# Patient Record
Sex: FEMALE | Race: WHITE | NOT HISPANIC OR LATINO | Employment: OTHER | URBAN - METROPOLITAN AREA
[De-identification: names, ages, dates, MRNs, and addresses within clinical notes are randomized per-mention and may not be internally consistent; named-entity substitution may affect disease eponyms.]

---

## 2017-01-12 ENCOUNTER — OFFICE VISIT (OUTPATIENT)
Dept: GASTROENTEROLOGY | Facility: CLINIC | Age: 67
End: 2017-01-12

## 2017-01-12 VITALS
BODY MASS INDEX: 25.83 KG/M2 | HEIGHT: 65 IN | SYSTOLIC BLOOD PRESSURE: 146 MMHG | DIASTOLIC BLOOD PRESSURE: 90 MMHG | WEIGHT: 155 LBS

## 2017-01-12 DIAGNOSIS — R63.5 WEIGHT GAIN: ICD-10-CM

## 2017-01-12 DIAGNOSIS — R12 HEARTBURN: Primary | ICD-10-CM

## 2017-01-12 PROCEDURE — 99204 OFFICE O/P NEW MOD 45 MIN: CPT | Performed by: INTERNAL MEDICINE

## 2017-01-12 RX ORDER — SODIUM CHLORIDE, SODIUM LACTATE, POTASSIUM CHLORIDE, CALCIUM CHLORIDE 600; 310; 30; 20 MG/100ML; MG/100ML; MG/100ML; MG/100ML
30 INJECTION, SOLUTION INTRAVENOUS CONTINUOUS
Status: CANCELLED | OUTPATIENT
Start: 2017-01-12

## 2017-01-12 RX ORDER — SODIUM CHLORIDE 0.9 % (FLUSH) 0.9 %
1-10 SYRINGE (ML) INJECTION AS NEEDED
Status: CANCELLED | OUTPATIENT
Start: 2017-01-12

## 2017-01-12 RX ORDER — PANTOPRAZOLE SODIUM 40 MG/1
40 TABLET, DELAYED RELEASE ORAL DAILY
Qty: 30 TABLET | Refills: 5 | Status: SHIPPED | OUTPATIENT
Start: 2017-01-12 | End: 2017-06-29 | Stop reason: SDUPTHER

## 2017-01-12 NOTE — MR AVS SNAPSHOT
Teagan Tillmant   1/12/2017 9:45 AM   Office Visit    Dept Phone:  [phone]   Encounter #:  [id number]    Provider:  Julianne White MD   Department:  [facility] GASTRO[facility]OLOG[facility]                Your Full Care Plan              Today's Medication Changes          These changes are accurate as of: 1/12/17 10:57 AM.  If you have any questions, ask your nurse or doctor.               New Medication(s)Ordered:     pantoprazole 40 MG EC tablet   Commonly known as:  PROTONIX   Take 1 tablet by mouth Daily. Take 30 minutes before breakfast   Started by:  Julianne White MD         Medication(s)that have changed:     venlafaxine  MG 24 hr capsule   Commonly known as:  EFFEXOR-XR   TAKE 1 CAPSULE DAILY.   What changed:  Another medication with the same name was removed. Continue taking this medication, and follow the directions you see here.   Changed by:  Jordan Jerez MD         Stop taking medication(s)listed here:     ESTRING 2 MG vaginal ring   Generic drug:  estradiol   Stopped by:  Julianne White MD           omeprazole 40 MG capsule   Commonly known as:  priLOSEC   Stopped by:  Julianne White MD                Where to Get Your Medications      These medications were sent to [facility] - [place], [place] - [address] - [phone] [facility] [phone] FX  [address] [address]     Phone:  [phone]     pantoprazole 40 MG EC tablet                  Your Updated Medication List          This list is accurate as of: 1/12/17 10:57 AM.  Always use your most recent med list.                candesartan 16 MG tablet   Commonly known as:  ATACAND   TAKE 1 TABLET EVERY DAY       CRESTOR 10 MG tablet   Generic drug:  rosuvastatin   TAKE 1 TABLET EVERY DAY       pantoprazole 40 MG EC tablet   Commonly known as:  PROTONIX   Take 1 tablet by mouth Daily. Take 30 minutes before breakfast       venlafaxine  MG 24 hr capsule      Commonly known as:  EFFEXOR-XR   TAKE 1 CAPSULE DAILY.               We Performed the Following     Case Request       You Were Diagnosed With        Codes Comments    Heartburn    -  Primary ICD-10-CM: R12  ICD-9-CM: 787.1 persistent despite    Weight gain     ICD-10-CM: R63.5  ICD-9-CM: 783.1       Instructions    Schedule the EGD    Stop the zegerid.  Start pantoprazole and take daily 30 minutes before breakfast    Gaol of 10# weight loss over the next year    For any additional questions, concerns or changes to your condition after today's office visit please contact the office at [phone].     Patient Instructions History      Upcoming Appointments     Visit Type Date Time Department    NEW PATIENT 2017  9:45 AM [facility] [place]    [facility]    [facility] UP [facility]  8[facility]00 [facility]      [facility] Signup     [facility] [employer] allows you to send messages to your doctor, view your test results, renew your prescriptions, schedule appointments, and more. To sign up, go to [employer] and click on the Sign Up Now link in the New User? box. Enter your K[employer] Activation Code exactly as it appears below along with the last four digits of your Social Security Number and your Date of Birth () to complete the sign-up process. If you do not sign up before the expiration date, you must request a new code.    K[employer]ek Activation Code: [email or web address]  Expires: 2017 10:53 AM    If you have questions, you can email [email or web address] [email or web address] or call [phone] to talk to our [employer] staff. Remember, K[employer] is NOT to be used for urgent needs. For medical emergencies, dial 911.               Other Info from Your Visit           Your Appointments     2017  8:15 AM EDT   [facility] (--)    [address] [address]   [phone]            May 01,  "2017  8:00 AM EDT   Follow Up with Jordan Jerez MD   [facility] (--)    [address] [address] [phone]           Arrive 15 minutes prior to appointment.              Allergies     Iodinated Diagnostic Agents      Sulfa Antibiotics        Reason for Visit     Heartburn           Vital Signs     Blood Pressure Height Weight Body Mass Index Smoking Status       146/90 65\" (165.1 cm) 155 lb (70.3 kg) 25.79 kg/m2 Never Smoker       Problems and Diagnoses Noted     Heartburn    -  Primary    Weight gain            "

## 2017-01-12 NOTE — PATIENT INSTRUCTIONS
Schedule the EGD    Stop the zegerid.  Start pantoprazole and take daily 30 minutes before breakfast    Gaol of 10# weight loss over the next year    For any additional questions, concerns or changes to your condition after today's office visit please contact the office at [phone].

## 2017-01-12 NOTE — PROGRESS NOTES
Chief Complaint   Patient presents with   • Heartburn       Subjective     HPI    Teagan Cates is a 66 y.o. female with a past medical history noted below who presents for evaluation of heartburn.  Symptoms present for about 5 years.  Describes suprasternal burning sensation and a bad metallic taste in her mouth.  Occurs mostly at night.  Frequently awakes her from sleeping.  She has been on Zegerid for this period of time.  She takes it nightly.  It used to control her symptoms but now she is having breakthrough symptoms.  She has had a breakthrough symptoms for approximate the past 4 months.  These breakthrough episode have been occurring about 5 times per week.  No associated nausea or vomiting.  No associated weight loss.  No change in her bowel habits.  She has been taking additional Tums at nighttime to help with the symptoms.    She thinks that she has gained about 30# over the past few years.      She has never had an EGD.    No smoking, drinks 2 glasses of wine at dinner.  Works at [employer].    No family history of GI malignancy.  She is up to date on her colonscopy-- last year and normal.        Past Medical History   Diagnosis Date   • Anxiety    • GERD (gastroesophageal reflux disease)    • Hyperlipidemia    • Hypertension          Current Outpatient Prescriptions:   •  candesartan (ATACAND) 16 MG tablet, TAKE 1 TABLET EVERY DAY, Disp: 90 tablet, Rfl: 3  •  CRESTOR 10 MG tablet, TAKE 1 TABLET EVERY DAY, Disp: 90 tablet, Rfl: 3  •  venlafaxine XR (EFFEXOR-XR) 150 MG 24 hr capsule, TAKE 1 CAPSULE DAILY., Disp: 90 capsule, Rfl: 3  •  pantoprazole (PROTONIX) 40 MG EC tablet, Take 1 tablet by mouth Daily. Take 30 minutes before breakfast, Disp: 30 tablet, Rfl: 5    Allergies   Allergen Reactions   • Iodinated Diagnostic Agents    • Sulfa Antibiotics        Social History     Social History   • Marital status:      Spouse name: N/A   • Number of children: N/A   • Years of education: N/A      Occupational History   • Not on file.     Social History Main Topics   • Smoking status: Never Smoker   • Smokeless tobacco: Not on file   • Alcohol use Yes      Comment: SOCIAL   • Drug use: No   • Sexual activity: Defer     Other Topics Concern   • Not on file     Social History Narrative       Family History   Problem Relation Age of Onset   • Hyperlipidemia Mother    • Stroke Mother    • Heart disease Father    • Stroke Sister    • Inflammatory bowel disease Brother        Review of Systems   Constitutional: Negative for activity change, appetite change and fatigue.   HENT: Positive for congestion. Negative for sore throat and trouble swallowing.         Currently with URI   Eyes: Negative.    Respiratory: Negative.    Cardiovascular: Negative.    Gastrointestinal: Negative for abdominal distention, abdominal pain, blood in stool, nausea and vomiting.        +heartburn   Endocrine: Negative for cold intolerance and heat intolerance.   Genitourinary: Negative for difficulty urinating, dysuria and frequency.   Musculoskeletal: Negative for arthralgias, back pain and myalgias.   Skin: Positive for rash.        On left arm, seen by her derm   Hematological: Negative for adenopathy. Does not bruise/bleed easily.   All other systems reviewed and are negative.      Objective     Vitals:    01/12/17 1008   BP: 146/90       Physical Exam   Constitutional: She is oriented to person, place, and time. She appears well-developed and well-nourished. No distress.   HENT:   Head: Normocephalic and atraumatic.   Right Ear: External ear normal.   Left Ear: External ear normal.   Nose: Nose normal.   Mouth/Throat: Oropharynx is clear and moist.   Eyes: Conjunctivae and EOM are normal. Right eye exhibits no discharge. Left eye exhibits no discharge. No scleral icterus.   Neck: Normal range of motion. Neck supple. No thyromegaly present.   No supraclavicular adenopathy   Cardiovascular: Normal rate, regular rhythm, normal heart  sounds and intact distal pulses.  Exam reveals no gallop.    No murmur heard.  No lower extremity edema   Pulmonary/Chest: Effort normal and breath sounds normal. No respiratory distress. She has no wheezes.   Abdominal: Soft. Normal appearance and bowel sounds are normal. She exhibits no distension and no mass. There is no hepatosplenomegaly. There is no tenderness. There is no rigidity, no rebound and no guarding.   Genitourinary:   Genitourinary Comments: Rectal exam deferred   Musculoskeletal: Normal range of motion. She exhibits no edema or tenderness.   No atrophy of upper or lower extremities.  Normal digits and nails of both hands.   Lymphadenopathy:     She has no cervical adenopathy.   Neurological: She is alert and oriented to person, place, and time. She displays no atrophy. Coordination normal.   Skin: Skin is warm and dry. No rash noted. She is not diaphoretic. No erythema.   Psychiatric: She has a normal mood and affect. Her behavior is normal. Judgment and thought content normal.   Vitals reviewed.      WBC   Date Value Ref Range Status   08/22/2015 9.44 4.50 - 10.70 K/Cumm Final     RBC   Date Value Ref Range Status   08/22/2015 4.45 3.90 - 5.20 Million Final     HEMOGLOBIN   Date Value Ref Range Status   08/22/2015 13.5 11.9 - 15.5 g/dL Final     HEMATOCRIT   Date Value Ref Range Status   08/22/2015 41.2 35.6 - 45.5 % Final     MCV   Date Value Ref Range Status   08/22/2015 92.6 80.5 - 98.2 fL Final     MCH   Date Value Ref Range Status   08/22/2015 30.3 26.9 - 32.0 pg Final     MCHC   Date Value Ref Range Status   08/22/2015 32.8 32.4 - 36.3 g/dL Final     RDW   Date Value Ref Range Status   08/22/2015 12.4 11.7 - 13.0 % Final     PLATELETS   Date Value Ref Range Status   08/22/2015 308 140 - 500 K/Cumm Final     NEUTROPHIL REL %   Date Value Ref Range Status   07/14/2014 54.6 42.7 - 76.0 % Final     LYMPHOCYTE REL %   Date Value Ref Range Status   07/14/2014 32.5 19.6 - 45.3 % Final     MONOCYTE  REL %   Date Value Ref Range Status   07/14/2014 8.0 5.0 - 12.0 % Final     EOSINOPHIL REL %   Date Value Ref Range Status   07/14/2014 4.3 0.3 - 6.2 % Final     BASOPHIL REL %   Date Value Ref Range Status   07/14/2014 0.6 0.0 - 1.5 % Final     NEUTROPHILS ABSOLUTE   Date Value Ref Range Status   07/14/2014 4.8 1.9 - 8.1 K/Cumm Final     LYMPHOCYTES ABSOLUTE   Date Value Ref Range Status   07/14/2014 2.9 0.9 - 4.8 K/Cumm Final     MONOCYTES ABSOLUTE   Date Value Ref Range Status   07/14/2014 0.7 0.2 - 1.2 K/Cumm Final     EOSINOPHILS ABSOLUTE   Date Value Ref Range Status   07/14/2014 0.4 0.0 - 0.7 K/Cumm Final     BASOPHILS ABSOLUTE   Date Value Ref Range Status   07/14/2014 0.1 0.0 - 0.2 K/Cumm Final       GLUCOSE   Date Value Ref Range Status   07/14/2014 90 65 - 99 mg/dL Final     SODIUM   Date Value Ref Range Status   10/12/2016 145 136 - 145 mmol/L Final     POTASSIUM   Date Value Ref Range Status   10/12/2016 4.7 3.5 - 5.2 mmol/L Final     CO2   Date Value Ref Range Status   07/14/2014 26 22 - 29 mmol/L Final     TOTAL CO2   Date Value Ref Range Status   10/12/2016 26.8 22.0 - 29.0 mmol/L Final     CHLORIDE   Date Value Ref Range Status   10/12/2016 103 98 - 107 mmol/L Final     CREATININE   Date Value Ref Range Status   10/12/2016 0.65 0.57 - 1.00 mg/dL Final   07/14/2014 0.60 0.57 - 1.00 mg/dL Final     BUN   Date Value Ref Range Status   10/12/2016 17 8 - 23 mg/dL Final     BUN/CREATININE RATIO   Date Value Ref Range Status   10/12/2016 26.2 (H) 7.0 - 25.0 Final     CALCIUM   Date Value Ref Range Status   10/12/2016 10.0 8.6 - 10.5 mg/dL Final     EGFR NON  AM   Date Value Ref Range Status   10/12/2016 91 >60 mL/min/1.73 Final     ALKALINE PHOSPHATASE   Date Value Ref Range Status   10/12/2016 53 39 - 117 U/L Final     ALT (SGPT)   Date Value Ref Range Status   10/12/2016 40 (H) 1 - 33 U/L Final     AST (SGOT)   Date Value Ref Range Status   10/12/2016 31 1 - 32 U/L Final     TOTAL BILIRUBIN   Date  Value Ref Range Status   10/12/2016 0.3 0.1 - 1.2 mg/dL Final     ALBUMIN   Date Value Ref Range Status   10/12/2016 4.50 3.50 - 5.20 g/dL Final     A/G RATIO   Date Value Ref Range Status   10/12/2016 1.6 g/dL Final         Imaging Results (last 7 days)     ** No results found for the last 168 hours. **            No notes on file    Assessment/Plan      1)heartburn: previously controlled by daily Zegerid.  Now with decreased control and breakthrough nocturnal symptoms.  Her weight has been stable over the past year.    2) fluctuating ALT: modest elevation that has been intermittently normal and intermittently a few points higher than ULN.  ? Effect of statin    3)weight gain: she reports abotu 30# increase over the past few years, likely contributing to her development of heartburn in her 60s.    Plan:  -given her age, duration of heartburn, and increase in symptoms despite therapy, I think it is time for her to have an EGD evaluation  -I will change her from daily omeprazole to daily pantoprazole.  I have educated her on how to take this 30 minutes before breakfast daily  -advised Gaviscon for breakthrough symptoms  -Additionally I advised for a 10 pound weight loss over the next year.  I think this will help her quite a bit with heartburn symptoms  -we will continue to follow her ALT levels and can consider liver ultrasound if they persist and fluctuating      Teagan was seen today for heartburn.    Diagnoses and all orders for this visit:    Heartburn  Comments:  persistent despite treatment  Orders:  -     pantoprazole (PROTONIX) 40 MG EC tablet; Take 1 tablet by mouth Daily. Take 30 minutes before breakfast  -     Case Request; Standing  -     Implement Anesthesia Orders Day of Procedure; Standing  -     Obtain Informed Consent; Standing  -     Verify Informed Consent; Standing  -     Insert Peripheral IV; Standing  -     Saline Lock & Maintain IV Access; Standing  -     sodium chloride 0.9 % flush 1-10 mL;  Infuse 1-10 mL into a venous catheter As Needed for line care.  -     lactated ringers infusion; Infuse 30 mL/hr into a venous catheter Continuous.  -     Case Request    Weight gain      I have discussed the above plan with the patient.  They verbalize understanding and are in agreement with the plan.  They have been advised to contact the office for any questions, concerns, or changes related to their health.    EMR Dragon/Transcription disclaimer:       Much of this encounter note is an electronic transcription/translation of spoken language to printed text. The electronic translation of spoken language may permit erroneous, or at times, nonsensical words or phrases to be inadvertently transcribed; Although I have reviewed the note for such errors, some may still exist.

## 2017-01-12 NOTE — LETTER
January 12, 2017     Jordan eJrez MD  [address] [facility] [address]    Patient: Teagan Cates   YOB: 1950   Date of Visit: 1/12/2017       Dear Dr. Solitario MD:    Thank you for referring Teagan Cates to me for evaluation. Below is a copy of my consult note.    If you have questions, please do not hesitate to call me. I look forward to following Teagan along with you.         Sincerely,        Julianne White MD        CC: No Recipients    Chief Complaint   Patient presents with   • Heartburn       Subjective     HPI    Teagan Cates is a 66 y.o. female with a past medical history noted below who presents for evaluation of heartburn.  Symptoms present for about 5 years.  Describes suprasternal burning sensation and a bad metallic taste in her mouth.  Occurs mostly at night.  Frequently awakes her from sleeping.  She has been on Zegerid for this period of time.  She takes it nightly.  It used to control her symptoms but now she is having breakthrough symptoms.  She has had a breakthrough symptoms for approximate the past 4 months.  These breakthrough episode have been occurring about 5 times per week.  No associated nausea or vomiting.  No associated weight loss.  No change in her bowel habits.  She has been taking additional Tums at nighttime to help with the symptoms.    She thinks that she has gained about 30# over the past few years.      She has never had an EGD.    No smoking, drinks 2 glasses of wine at dinner.  Works at [employer].    No family history of GI malignancy.  She is up to date on her colonscopy-- last year and normal.        Past Medical History   Diagnosis Date   • Anxiety    • GERD (gastroesophageal reflux disease)    • Hyperlipidemia    • Hypertension          Current Outpatient Prescriptions:   •  candesartan (ATACAND) 16 MG tablet, TAKE 1 TABLET EVERY DAY, Disp: 90 tablet, Rfl: 3  •  CRESTOR 10 MG tablet, TAKE 1 TABLET EVERY DAY, Disp: 90 tablet, Rfl:  3  •  venlafaxine XR (EFFEXOR-XR) 150 MG 24 hr capsule, TAKE 1 CAPSULE DAILY., Disp: 90 capsule, Rfl: 3  •  pantoprazole (PROTONIX) 40 MG EC tablet, Take 1 tablet by mouth Daily. Take 30 minutes before breakfast, Disp: 30 tablet, Rfl: 5    Allergies   Allergen Reactions   • Iodinated Diagnostic Agents    • Sulfa Antibiotics        Social History     Social History   • Marital status:      Spouse name: N/A   • Number of children: N/A   • Years of education: N/A     Occupational History   • Not on file.     Social History Main Topics   • Smoking status: Never Smoker   • Smokeless tobacco: Not on file   • Alcohol use Yes      Comment: SOCIAL   • Drug use: No   • Sexual activity: Defer     Other Topics Concern   • Not on file     Social History Narrative       Family History   Problem Relation Age of Onset   • Hyperlipidemia Mother    • Stroke Mother    • Heart disease Father    • Stroke Sister    • Inflammatory bowel disease Brother        Review of Systems   Constitutional: Negative for activity change, appetite change and fatigue.   HENT: Positive for congestion. Negative for sore throat and trouble swallowing.         Currently with URI   Eyes: Negative.    Respiratory: Negative.    Cardiovascular: Negative.    Gastrointestinal: Negative for abdominal distention, abdominal pain, blood in stool, nausea and vomiting.        +heartburn   Endocrine: Negative for cold intolerance and heat intolerance.   Genitourinary: Negative for difficulty urinating, dysuria and frequency.   Musculoskeletal: Negative for arthralgias, back pain and myalgias.   Skin: Positive for rash.        On left arm, seen by her derm   Hematological: Negative for adenopathy. Does not bruise/bleed easily.   All other systems reviewed and are negative.      Objective     Vitals:    01/12/17 1008   BP: 146/90       Physical Exam   Constitutional: She is oriented to person, place, and time. She appears well-developed and well-nourished. No  distress.   HENT:   Head: Normocephalic and atraumatic.   Right Ear: External ear normal.   Left Ear: External ear normal.   Nose: Nose normal.   Mouth/Throat: Oropharynx is clear and moist.   Eyes: Conjunctivae and EOM are normal. Right eye exhibits no discharge. Left eye exhibits no discharge. No scleral icterus.   Neck: Normal range of motion. Neck supple. No thyromegaly present.   No supraclavicular adenopathy   Cardiovascular: Normal rate, regular rhythm, normal heart sounds and intact distal pulses.  Exam reveals no gallop.    No murmur heard.  No lower extremity edema   Pulmonary/Chest: Effort normal and breath sounds normal. No respiratory distress. She has no wheezes.   Abdominal: Soft. Normal appearance and bowel sounds are normal. She exhibits no distension and no mass. There is no hepatosplenomegaly. There is no tenderness. There is no rigidity, no rebound and no guarding.   Genitourinary:   Genitourinary Comments: Rectal exam deferred   Musculoskeletal: Normal range of motion. She exhibits no edema or tenderness.   No atrophy of upper or lower extremities.  Normal digits and nails of both hands.   Lymphadenopathy:     She has no cervical adenopathy.   Neurological: She is alert and oriented to person, place, and time. She displays no atrophy. Coordination normal.   Skin: Skin is warm and dry. No rash noted. She is not diaphoretic. No erythema.   Psychiatric: She has a normal mood and affect. Her behavior is normal. Judgment and thought content normal.   Vitals reviewed.      WBC   Date Value Ref Range Status   08/22/2015 9.44 4.50 - 10.70 K/Cumm Final     RBC   Date Value Ref Range Status   08/22/2015 4.45 3.90 - 5.20 Million Final     HEMOGLOBIN   Date Value Ref Range Status   08/22/2015 13.5 11.9 - 15.5 g/dL Final     HEMATOCRIT   Date Value Ref Range Status   08/22/2015 41.2 35.6 - 45.5 % Final     MCV   Date Value Ref Range Status   08/22/2015 92.6 80.5 - 98.2 fL Final     MCH   Date Value Ref Range  Status   08/22/2015 30.3 26.9 - 32.0 pg Final     MCHC   Date Value Ref Range Status   08/22/2015 32.8 32.4 - 36.3 g/dL Final     RDW   Date Value Ref Range Status   08/22/2015 12.4 11.7 - 13.0 % Final     PLATELETS   Date Value Ref Range Status   08/22/2015 308 140 - 500 K/Cumm Final     NEUTROPHIL REL %   Date Value Ref Range Status   07/14/2014 54.6 42.7 - 76.0 % Final     LYMPHOCYTE REL %   Date Value Ref Range Status   07/14/2014 32.5 19.6 - 45.3 % Final     MONOCYTE REL %   Date Value Ref Range Status   07/14/2014 8.0 5.0 - 12.0 % Final     EOSINOPHIL REL %   Date Value Ref Range Status   07/14/2014 4.3 0.3 - 6.2 % Final     BASOPHIL REL %   Date Value Ref Range Status   07/14/2014 0.6 0.0 - 1.5 % Final     NEUTROPHILS ABSOLUTE   Date Value Ref Range Status   07/14/2014 4.8 1.9 - 8.1 K/Cumm Final     LYMPHOCYTES ABSOLUTE   Date Value Ref Range Status   07/14/2014 2.9 0.9 - 4.8 K/Cumm Final     MONOCYTES ABSOLUTE   Date Value Ref Range Status   07/14/2014 0.7 0.2 - 1.2 K/Cumm Final     EOSINOPHILS ABSOLUTE   Date Value Ref Range Status   07/14/2014 0.4 0.0 - 0.7 K/Cumm Final     BASOPHILS ABSOLUTE   Date Value Ref Range Status   07/14/2014 0.1 0.0 - 0.2 K/Cumm Final       GLUCOSE   Date Value Ref Range Status   07/14/2014 90 65 - 99 mg/dL Final     SODIUM   Date Value Ref Range Status   10/12/2016 145 136 - 145 mmol/L Final     POTASSIUM   Date Value Ref Range Status   10/12/2016 4.7 3.5 - 5.2 mmol/L Final     CO2   Date Value Ref Range Status   07/14/2014 26 22 - 29 mmol/L Final     TOTAL CO2   Date Value Ref Range Status   10/12/2016 26.8 22.0 - 29.0 mmol/L Final     CHLORIDE   Date Value Ref Range Status   10/12/2016 103 98 - 107 mmol/L Final     CREATININE   Date Value Ref Range Status   10/12/2016 0.65 0.57 - 1.00 mg/dL Final   07/14/2014 0.60 0.57 - 1.00 mg/dL Final     BUN   Date Value Ref Range Status   10/12/2016 17 8 - 23 mg/dL Final     BUN/CREATININE RATIO   Date Value Ref Range Status   10/12/2016  26.2 (H) 7.0 - 25.0 Final     CALCIUM   Date Value Ref Range Status   10/12/2016 10.0 8.6 - 10.5 mg/dL Final     EGFR NON  AM   Date Value Ref Range Status   10/12/2016 91 >60 mL/min/1.73 Final     ALKALINE PHOSPHATASE   Date Value Ref Range Status   10/12/2016 53 39 - 117 U/L Final     ALT (SGPT)   Date Value Ref Range Status   10/12/2016 40 (H) 1 - 33 U/L Final     AST (SGOT)   Date Value Ref Range Status   10/12/2016 31 1 - 32 U/L Final     TOTAL BILIRUBIN   Date Value Ref Range Status   10/12/2016 0.3 0.1 - 1.2 mg/dL Final     ALBUMIN   Date Value Ref Range Status   10/12/2016 4.50 3.50 - 5.20 g/dL Final     A/G RATIO   Date Value Ref Range Status   10/12/2016 1.6 g/dL Final         Imaging Results (last 7 days)     ** No results found for the last 168 hours. **            No notes on file    Assessment/Plan      1)heartburn: previously controlled by daily Zegerid.  Now with decreased control and breakthrough nocturnal symptoms.  Her weight has been stable over the past year.    2) fluctuating ALT: modest elevation that has been intermittently normal and intermittently a few points higher than ULN.  ? Effect of statin    3)weight gain: she reports abotu 30# increase over the past few years, likely contributing to her development of heartburn in her 60s.    Plan:  -given her age, duration of heartburn, and increase in symptoms despite therapy, I think it is time for her to have an EGD evaluation  -I will change her from daily omeprazole to daily pantoprazole.  I have educated her on how to take this 30 minutes before breakfast daily  -advised Gaviscon for breakthrough symptoms  -Additionally I advised for a 10 pound weight loss over the next year.  I think this will help her quite a bit with heartburn symptoms  -we will continue to follow her ALT levels and can consider liver ultrasound if they persist and fluctuating      Teagan was seen today for heartburn.    Diagnoses and all orders for this  visit:    Heartburn  Comments:  persistent despite treatment  Orders:  -     pantoprazole (PROTONIX) 40 MG EC tablet; Take 1 tablet by mouth Daily. Take 30 minutes before breakfast  -     Case Request; Standing  -     Implement Anesthesia Orders Day of Procedure; Standing  -     Obtain Informed Consent; Standing  -     Verify Informed Consent; Standing  -     Insert Peripheral IV; Standing  -     Saline Lock & Maintain IV Access; Standing  -     sodium chloride 0.9 % flush 1-10 mL; Infuse 1-10 mL into a venous catheter As Needed for line care.  -     lactated ringers infusion; Infuse 30 mL/hr into a venous catheter Continuous.  -     Case Request    Weight gain      I have discussed the above plan with the patient.  They verbalize understanding and are in agreement with the plan.  They have been advised to contact the office for any questions, concerns, or changes related to their health.    EMR Dragon/Transcription disclaimer:       Much of this encounter note is an electronic transcription/translation of spoken language to printed text. The electronic translation of spoken language may permit erroneous, or at times, nonsensical words or phrases to be inadvertently transcribed; Although I have reviewed the note for such errors, some may still exist.

## 2017-01-26 RX ORDER — VENLAFAXINE HYDROCHLORIDE 150 MG/1
CAPSULE, EXTENDED RELEASE ORAL
Qty: 90 CAPSULE | Refills: 2 | Status: SHIPPED | OUTPATIENT
Start: 2017-01-26 | End: 2017-06-06 | Stop reason: SDUPTHER

## 2017-01-26 RX ORDER — OMEPRAZOLE 40 MG/1
CAPSULE, DELAYED RELEASE ORAL
Qty: 90 CAPSULE | Refills: 2 | Status: ON HOLD | OUTPATIENT
Start: 2017-01-26 | End: 2017-05-02 | Stop reason: ALTCHOICE

## 2017-05-02 ENCOUNTER — ANESTHESIA (OUTPATIENT)
Dept: GASTROENTEROLOGY | Facility: HOSPITAL | Age: 67
End: 2017-05-02

## 2017-05-02 ENCOUNTER — ANESTHESIA EVENT (OUTPATIENT)
Dept: GASTROENTEROLOGY | Facility: HOSPITAL | Age: 67
End: 2017-05-02

## 2017-05-02 ENCOUNTER — HOSPITAL ENCOUNTER (OUTPATIENT)
Facility: HOSPITAL | Age: 67
Setting detail: HOSPITAL OUTPATIENT SURGERY
Discharge: HOME | End: 2017-05-02
Attending: INTERNAL MEDICINE | Admitting: INTERNAL MEDICINE

## 2017-05-02 VITALS
DIASTOLIC BLOOD PRESSURE: 90 MMHG | RESPIRATION RATE: 20 BRPM | SYSTOLIC BLOOD PRESSURE: 157 MMHG | HEIGHT: 65 IN | BODY MASS INDEX: 25.83 KG/M2 | WEIGHT: 155 LBS | HEART RATE: 87 BPM | TEMPERATURE: 98.4 F | OXYGEN SATURATION: 95 %

## 2017-05-02 DIAGNOSIS — K21.9 GERD (GASTROESOPHAGEAL REFLUX DISEASE): ICD-10-CM

## 2017-05-02 DIAGNOSIS — R12 HEARTBURN: ICD-10-CM

## 2017-05-02 PROCEDURE — 43239 EGD BIOPSY SINGLE/MULTIPLE: CPT | Performed by: INTERNAL MEDICINE

## 2017-05-02 PROCEDURE — 88312 SPECIAL STAINS GROUP 1: CPT | Performed by: INTERNAL MEDICINE

## 2017-05-02 PROCEDURE — 43251 EGD REMOVE LESION SNARE: CPT | Performed by: INTERNAL MEDICINE

## 2017-05-02 PROCEDURE — 88305 TISSUE EXAM BY PATHOLOGIST: CPT | Performed by: INTERNAL MEDICINE

## 2017-05-02 PROCEDURE — 25010000002 PROPOFOL 10 MG/ML EMULSION: Performed by: ANESTHESIOLOGY

## 2017-05-02 RX ORDER — SODIUM CHLORIDE, SODIUM LACTATE, POTASSIUM CHLORIDE, CALCIUM CHLORIDE 600; 310; 30; 20 MG/100ML; MG/100ML; MG/100ML; MG/100ML
30 INJECTION, SOLUTION INTRAVENOUS CONTINUOUS
Status: DISCONTINUED | OUTPATIENT
Start: 2017-05-02 | End: 2017-05-02 | Stop reason: HOSPADM

## 2017-05-02 RX ORDER — ONDANSETRON 2 MG/ML
4 INJECTION INTRAMUSCULAR; INTRAVENOUS ONCE AS NEEDED
Status: DISCONTINUED | OUTPATIENT
Start: 2017-05-02 | End: 2017-05-02 | Stop reason: HOSPADM

## 2017-05-02 RX ORDER — PROPOFOL 10 MG/ML
VIAL (ML) INTRAVENOUS AS NEEDED
Status: DISCONTINUED | OUTPATIENT
Start: 2017-05-02 | End: 2017-05-02 | Stop reason: SURG

## 2017-05-02 RX ORDER — SODIUM CHLORIDE 0.9 % (FLUSH) 0.9 %
1-10 SYRINGE (ML) INJECTION AS NEEDED
Status: DISCONTINUED | OUTPATIENT
Start: 2017-05-02 | End: 2017-05-02 | Stop reason: HOSPADM

## 2017-05-02 RX ORDER — LIDOCAINE HYDROCHLORIDE 20 MG/ML
INJECTION, SOLUTION INFILTRATION; PERINEURAL AS NEEDED
Status: DISCONTINUED | OUTPATIENT
Start: 2017-05-02 | End: 2017-05-02 | Stop reason: SURG

## 2017-05-02 RX ADMIN — PROPOFOL 200 MG: 10 INJECTION, EMULSION INTRAVENOUS at 12:09

## 2017-05-02 RX ADMIN — LIDOCAINE HYDROCHLORIDE 100 MG: 20 INJECTION, SOLUTION INFILTRATION; PERINEURAL at 11:45

## 2017-05-02 RX ADMIN — PROPOFOL 200 MG: 10 INJECTION, EMULSION INTRAVENOUS at 11:50

## 2017-05-02 RX ADMIN — SODIUM CHLORIDE, POTASSIUM CHLORIDE, SODIUM LACTATE AND CALCIUM CHLORIDE 30 ML/HR: 600; 310; 30; 20 INJECTION, SOLUTION INTRAVENOUS at 11:11

## 2017-05-02 RX ADMIN — PROPOFOL 200 MG: 10 INJECTION, EMULSION INTRAVENOUS at 11:45

## 2017-05-03 LAB
CYTO UR: NORMAL
LAB AP CASE REPORT: NORMAL
Lab: NORMAL
PATH REPORT.FINAL DX SPEC: NORMAL
PATH REPORT.GROSS SPEC: NORMAL

## 2017-05-08 ENCOUNTER — TELEPHONE (OUTPATIENT)
Dept: GASTROENTEROLOGY | Facility: CLINIC | Age: 67
End: 2017-05-08

## 2017-05-22 DIAGNOSIS — E78.5 HYPERLIPIDEMIA, UNSPECIFIED HYPERLIPIDEMIA TYPE: ICD-10-CM

## 2017-05-22 DIAGNOSIS — I10 ESSENTIAL HYPERTENSION: Primary | ICD-10-CM

## 2017-05-25 LAB
ALBUMIN SERPL-MCNC: 4.9 G/DL (ref 3.5–5.2)
ALBUMIN/GLOB SERPL: 1.9 G/DL
ALP SERPL-CCNC: 52 U/L (ref 39–117)
ALT SERPL-CCNC: 31 U/L (ref 1–33)
AST SERPL-CCNC: 24 U/L (ref 1–32)
BILIRUB SERPL-MCNC: 0.3 MG/DL (ref 0.1–1.2)
BUN SERPL-MCNC: 13 MG/DL (ref 8–23)
BUN/CREAT SERPL: 19.4 (ref 7–25)
CALCIUM SERPL-MCNC: 9.9 MG/DL (ref 8.6–10.5)
CHLORIDE SERPL-SCNC: 102 MMOL/L (ref 98–107)
CHOLEST SERPL-MCNC: 200 MG/DL (ref 0–200)
CO2 SERPL-SCNC: 25.4 MMOL/L (ref 22–29)
CREAT SERPL-MCNC: 0.67 MG/DL (ref 0.57–1)
GLOBULIN SER CALC-MCNC: 2.6 GM/DL
GLUCOSE SERPL-MCNC: 121 MG/DL (ref 65–99)
HDLC SERPL-MCNC: 70 MG/DL (ref 40–60)
LDLC SERPL CALC-MCNC: 106 MG/DL (ref 0–100)
LDLC/HDLC SERPL: 1.51 {RATIO}
POTASSIUM SERPL-SCNC: 4.3 MMOL/L (ref 3.5–5.2)
PROT SERPL-MCNC: 7.5 G/DL (ref 6–8.5)
SODIUM SERPL-SCNC: 143 MMOL/L (ref 136–145)
TRIGL SERPL-MCNC: 122 MG/DL (ref 0–150)
VLDLC SERPL CALC-MCNC: 24.4 MG/DL (ref 5–40)

## 2017-05-31 ENCOUNTER — OFFICE VISIT (OUTPATIENT)
Dept: FAMILY MEDICINE CLINIC | Facility: CLINIC | Age: 67
End: 2017-05-31

## 2017-05-31 VITALS
BODY MASS INDEX: 26.23 KG/M2 | HEIGHT: 65 IN | TEMPERATURE: 97.6 F | WEIGHT: 157.4 LBS | SYSTOLIC BLOOD PRESSURE: 120 MMHG | HEART RATE: 86 BPM | RESPIRATION RATE: 16 BRPM | DIASTOLIC BLOOD PRESSURE: 68 MMHG

## 2017-05-31 DIAGNOSIS — Z23 NEED FOR VACCINATION: Primary | ICD-10-CM

## 2017-05-31 DIAGNOSIS — I10 BENIGN ESSENTIAL HTN: ICD-10-CM

## 2017-05-31 DIAGNOSIS — E78.2 MIXED HYPERLIPIDEMIA: ICD-10-CM

## 2017-05-31 DIAGNOSIS — R73.9 HYPERGLYCEMIA: ICD-10-CM

## 2017-05-31 PROCEDURE — G0009 ADMIN PNEUMOCOCCAL VACCINE: HCPCS

## 2017-05-31 PROCEDURE — 90670 PCV13 VACCINE IM: CPT

## 2017-05-31 PROCEDURE — 99213 OFFICE O/P EST LOW 20 MIN: CPT

## 2017-06-06 RX ORDER — VENLAFAXINE HYDROCHLORIDE 150 MG/1
150 CAPSULE, EXTENDED RELEASE ORAL DAILY
Qty: 90 CAPSULE | Refills: 3 | Status: SHIPPED | OUTPATIENT
Start: 2017-06-06 | End: 2018-06-06 | Stop reason: SDUPTHER

## 2017-06-06 RX ORDER — CANDESARTAN 16 MG/1
16 TABLET ORAL DAILY
Qty: 90 TABLET | Refills: 3 | Status: SHIPPED | OUTPATIENT
Start: 2017-06-06 | End: 2018-06-06 | Stop reason: SDUPTHER

## 2017-06-06 RX ORDER — ROSUVASTATIN CALCIUM 10 MG/1
10 TABLET, COATED ORAL DAILY
Qty: 90 TABLET | Refills: 3 | Status: SHIPPED | OUTPATIENT
Start: 2017-06-06 | End: 2018-06-06 | Stop reason: SDUPTHER

## 2017-06-29 ENCOUNTER — OFFICE VISIT (OUTPATIENT)
Dept: GASTROENTEROLOGY | Facility: CLINIC | Age: 67
End: 2017-06-29

## 2017-06-29 VITALS
SYSTOLIC BLOOD PRESSURE: 126 MMHG | DIASTOLIC BLOOD PRESSURE: 82 MMHG | HEIGHT: 65 IN | TEMPERATURE: 97.7 F | BODY MASS INDEX: 24.83 KG/M2 | WEIGHT: 149 LBS

## 2017-06-29 DIAGNOSIS — R12 HEARTBURN: ICD-10-CM

## 2017-06-29 DIAGNOSIS — K21.9 GASTROESOPHAGEAL REFLUX DISEASE WITHOUT ESOPHAGITIS: Primary | ICD-10-CM

## 2017-06-29 DIAGNOSIS — R74.01 ELEVATED ALT MEASUREMENT: ICD-10-CM

## 2017-06-29 DIAGNOSIS — K44.9 HIATAL HERNIA: ICD-10-CM

## 2017-06-29 PROCEDURE — 99213 OFFICE O/P EST LOW 20 MIN: CPT | Performed by: INTERNAL MEDICINE

## 2017-06-29 RX ORDER — PANTOPRAZOLE SODIUM 40 MG/1
40 TABLET, DELAYED RELEASE ORAL DAILY
Qty: 90 TABLET | Refills: 2 | Status: SHIPPED | OUTPATIENT
Start: 2017-06-29 | End: 2018-03-15 | Stop reason: SDUPTHER

## 2017-06-29 NOTE — PATIENT INSTRUCTIONS
Continue the pantoprazole every morning    Good job with the weight loss!    For any additional questions, concerns or changes to your condition after today's office visit please contact the office at [phone].

## 2017-06-29 NOTE — PROGRESS NOTES
Chief Complaint   Patient presents with   • Heartburn   • Follow-up     from scope     Subjective     HPI  Teagan Cates is a 67 y.o. female who presents for follow up of heartburn and fluctuations in her ALT level.  I performed her EGD 5/2/17-- notable for fundic gland polyps, gastric and duodenal inflammation. She also had a large 5cm hernia.  She has been on pantoprazole and taking it every morning.  No regurgitation, no heartburn, no breathing issues.  No nausea or vomiting.  She feels that the ppi is controlling her symptoms quite well.  She has lost about 10 pounds and feels that this has helped her symptoms as well.  BMI is now less than 25.  She does not take any nsaids.      Past Medical History:   Diagnosis Date   • Anxiety    • GERD (gastroesophageal reflux disease)    • Hyperlipidemia    • Hypertension        Social History     Social History   • Marital status:      Spouse name: N/A   • Number of children: N/A   • Years of education: N/A     Social History Main Topics   • Smoking status: Never Smoker   • Smokeless tobacco: None   • Alcohol use Yes      Comment: SOCIAL   • Drug use: No   • Sexual activity: Defer     Other Topics Concern   • None     Social History Narrative         Current Outpatient Prescriptions:   •  candesartan (ATACAND) 16 MG tablet, Take 1 tablet by mouth Daily., Disp: 90 tablet, Rfl: 3  •  rosuvastatin (CRESTOR) 10 MG tablet, Take 1 tablet by mouth Daily., Disp: 90 tablet, Rfl: 3  •  venlafaxine XR (EFFEXOR-XR) 150 MG 24 hr capsule, Take 1 capsule by mouth Daily., Disp: 90 capsule, Rfl: 3  •  pantoprazole (PROTONIX) 40 MG EC tablet, Take 1 tablet by mouth Daily. Take 30 minutes before breakfast, Disp: 90 tablet, Rfl: 2    Review of Systems   Constitutional: Negative for activity change, appetite change and fatigue.   HENT: Negative for sore throat and trouble swallowing.    Gastrointestinal: Negative for abdominal distention, abdominal pain and blood in stool.   Endocrine:  Negative for cold intolerance and heat intolerance.   Genitourinary: Negative for difficulty urinating, dysuria and frequency.   Musculoskeletal: Negative for arthralgias, back pain and myalgias.   Hematological: Negative for adenopathy. Does not bruise/bleed easily.   All other systems reviewed and are negative.      Objective   Vitals:    06/29/17 0922   BP: 126/82   Temp: 97.7 °F (36.5 °C)     Last Weight    06/29/17 0922   Weight: 149 lb (67.6 kg)     Body mass index is 24.79 kg/(m^2).      Physical Exam   Constitutional: She is oriented to person, place, and time. She appears well-developed and well-nourished. No distress.   HENT:   Head: Normocephalic and atraumatic.   Right Ear: External ear normal.   Left Ear: External ear normal.   Nose: Nose normal.   Eyes: Conjunctivae and EOM are normal. No scleral icterus.   Cardiovascular: Normal rate, regular rhythm, normal heart sounds and intact distal pulses.  Exam reveals no gallop.    No murmur heard.  No lower extremity edema   Pulmonary/Chest: Effort normal and breath sounds normal. No respiratory distress. She has no wheezes.   Abdominal: Soft. Normal appearance and bowel sounds are normal. She exhibits no distension and no mass. There is no hepatosplenomegaly. There is no rigidity, no rebound and no guarding.   Genitourinary:   Genitourinary Comments: Rectal exam deferred   Musculoskeletal: Normal range of motion. She exhibits no edema or tenderness.   Normal digits and nails of both hands.  No atrophy or wasting of upper or lower extremeties   Neurological: She is alert and oriented to person, place, and time. She displays no atrophy. Coordination normal.   Skin: Skin is warm and dry. No rash noted. She is not diaphoretic. No erythema.   Psychiatric: She has a normal mood and affect. Her behavior is normal. Judgment and thought content normal.   Vitals reviewed.          Lab Results   Component Value Date    GLUCOSE 90 07/14/2014    BUN 13 05/25/2017     CREATININE 0.67 05/25/2017    EGFRIFNONA 88 05/25/2017    EGFRIFAFRI 107 05/25/2017    BCR 19.4 05/25/2017    CO2 25.4 05/25/2017    CALCIUM 9.9 05/25/2017    PROTENTOTREF 7.5 05/25/2017    ALBUMIN 4.90 05/25/2017    LABIL2 1.9 05/25/2017    AST 24 05/25/2017    ALT 31 05/25/2017         Imaging Results (last 7 days)     ** No results found for the last 168 hours. **            Assessment/Plan    1. GERD: Likely related to her hiatal hernia.  Symptoms well controlled with pantoprazole  2.  Hiatal hernia: About 5 cm in size.  Asymptomatic at this time  3.  Elevated ALT: normal on May labs; medications or limited abnormality or lab error    Plan:  -Continue the pantoprazole  -Continue to monitor symptoms of GERD and of the hiatal hernia  -If a hiatal hernia becomes symptomatic, she understands that surgery may be an option for her  -f/u in 1 year, sooner if needed  Teagan was seen today for heartburn and follow-up.    Diagnoses and all orders for this visit:    Gastroesophageal reflux disease without esophagitis  -     pantoprazole (PROTONIX) 40 MG EC tablet; Take 1 tablet by mouth Daily. Take 30 minutes before breakfast    Hiatal hernia  -     pantoprazole (PROTONIX) 40 MG EC tablet; Take 1 tablet by mouth Daily. Take 30 minutes before breakfast    Elevated ALT measurement    Heartburn  Comments:  persistent despite treatment  Orders:  -     pantoprazole (PROTONIX) 40 MG EC tablet; Take 1 tablet by mouth Daily. Take 30 minutes before breakfast      Dictated utilizing Dragon dictation

## 2017-11-02 ENCOUNTER — OFFICE VISIT (OUTPATIENT)
Dept: ORTHOPEDIC SURGERY | Facility: CLINIC | Age: 67
End: 2017-11-02

## 2017-11-02 VITALS — WEIGHT: 140 LBS | HEIGHT: 65 IN | BODY MASS INDEX: 23.32 KG/M2

## 2017-11-02 DIAGNOSIS — M25.531 RIGHT WRIST PAIN: Primary | ICD-10-CM

## 2017-11-02 DIAGNOSIS — M65.4 DE QUERVAIN'S DISEASE (RADIAL STYLOID TENOSYNOVITIS): ICD-10-CM

## 2017-11-02 PROCEDURE — 73110 X-RAY EXAM OF WRIST: CPT | Performed by: ORTHOPAEDIC SURGERY

## 2017-11-02 PROCEDURE — 99203 OFFICE O/P NEW LOW 30 MIN: CPT | Performed by: ORTHOPAEDIC SURGERY

## 2017-11-02 RX ORDER — MELOXICAM 15 MG/1
TABLET ORAL
Qty: 30 TABLET | Refills: 3 | Status: SHIPPED | OUTPATIENT
Start: 2017-11-02 | End: 2020-01-27

## 2017-11-03 ENCOUNTER — TELEPHONE (OUTPATIENT)
Dept: ORTHOPEDIC SURGERY | Facility: CLINIC | Age: 67
End: 2017-11-03

## 2017-11-29 DIAGNOSIS — I10 ESSENTIAL HYPERTENSION: Primary | ICD-10-CM

## 2017-11-29 DIAGNOSIS — E78.5 HYPERLIPIDEMIA, UNSPECIFIED HYPERLIPIDEMIA TYPE: ICD-10-CM

## 2017-11-29 DIAGNOSIS — R73.9 HYPERGLYCEMIA: ICD-10-CM

## 2017-11-30 LAB
ALBUMIN SERPL-MCNC: 4.6 G/DL (ref 3.5–5.2)
ALBUMIN/GLOB SERPL: 1.5 G/DL
ALP SERPL-CCNC: 56 U/L (ref 39–117)
ALT SERPL-CCNC: 26 U/L (ref 1–33)
AST SERPL-CCNC: 26 U/L (ref 1–32)
BILIRUB SERPL-MCNC: 0.5 MG/DL (ref 0.1–1.2)
BUN SERPL-MCNC: 22 MG/DL (ref 8–23)
BUN/CREAT SERPL: 25.9 (ref 7–25)
CALCIUM SERPL-MCNC: 10.2 MG/DL (ref 8.6–10.5)
CHLORIDE SERPL-SCNC: 101 MMOL/L (ref 98–107)
CHOLEST SERPL-MCNC: 196 MG/DL (ref 0–200)
CO2 SERPL-SCNC: 23.3 MMOL/L (ref 22–29)
CREAT SERPL-MCNC: 0.85 MG/DL (ref 0.57–1)
GFR SERPLBLD CREATININE-BSD FMLA CKD-EPI: 67 ML/MIN/1.73
GFR SERPLBLD CREATININE-BSD FMLA CKD-EPI: 81 ML/MIN/1.73
GLOBULIN SER CALC-MCNC: 3.1 GM/DL
GLUCOSE SERPL-MCNC: 116 MG/DL (ref 65–99)
HBA1C MFR BLD: 5.52 % (ref 4.8–5.6)
HDLC SERPL-MCNC: 75 MG/DL (ref 40–60)
LDLC SERPL CALC-MCNC: 91 MG/DL (ref 0–100)
LDLC/HDLC SERPL: 1.22 {RATIO}
POTASSIUM SERPL-SCNC: 4.1 MMOL/L (ref 3.5–5.2)
PROT SERPL-MCNC: 7.7 G/DL (ref 6–8.5)
SODIUM SERPL-SCNC: 143 MMOL/L (ref 136–145)
TRIGL SERPL-MCNC: 148 MG/DL (ref 0–150)
VLDLC SERPL CALC-MCNC: 29.6 MG/DL (ref 5–40)

## 2017-12-07 ENCOUNTER — OFFICE VISIT (OUTPATIENT)
Dept: FAMILY MEDICINE CLINIC | Facility: CLINIC | Age: 67
End: 2017-12-07

## 2017-12-07 VITALS
DIASTOLIC BLOOD PRESSURE: 78 MMHG | BODY MASS INDEX: 22.99 KG/M2 | HEART RATE: 105 BPM | WEIGHT: 138 LBS | RESPIRATION RATE: 16 BRPM | HEIGHT: 65 IN | OXYGEN SATURATION: 98 % | TEMPERATURE: 98.1 F | SYSTOLIC BLOOD PRESSURE: 156 MMHG

## 2017-12-07 DIAGNOSIS — I10 BENIGN ESSENTIAL HTN: ICD-10-CM

## 2017-12-07 DIAGNOSIS — R73.9 HYPERGLYCEMIA: Primary | ICD-10-CM

## 2017-12-07 DIAGNOSIS — E78.2 MIXED HYPERLIPIDEMIA: ICD-10-CM

## 2017-12-07 PROCEDURE — 99213 OFFICE O/P EST LOW 20 MIN: CPT

## 2017-12-07 NOTE — PROGRESS NOTES
Subjective   Teagan Cates is a 67 y.o. female. Patient is here today for   Chief Complaint   Patient presents with   • Hyperglycemia   • Hyperlipidemia   • Hypertension          Vitals:    12/07/17 0754   BP: 156/78   Pulse: 105   Resp: 16   Temp: 98.1 °F (36.7 °C)   SpO2: 98%     The following portions of the patient's history were reviewed and updated as appropriate: allergies, current medications, past family history, past medical history, past social history, past surgical history and problem list.    Past Medical History:   Diagnosis Date   • Anxiety    • GERD (gastroesophageal reflux disease)    • Hyperlipidemia    • Hypertension       Allergies   Allergen Reactions   • Sulfa Antibiotics Anaphylaxis   • Iodinated Diagnostic Agents Rash      Social History     Social History   • Marital status:      Spouse name: N/A   • Number of children: N/A   • Years of education: N/A     Occupational History   • Not on file.     Social History Main Topics   • Smoking status: Never Smoker   • Smokeless tobacco: Not on file   • Alcohol use Yes      Comment: SOCIAL   • Drug use: No   • Sexual activity: Defer     Other Topics Concern   • Not on file     Social History Narrative        Current Outpatient Prescriptions:   •  candesartan (ATACAND) 16 MG tablet, Take 1 tablet by mouth Daily., Disp: 90 tablet, Rfl: 3  •  diclofenac (VOLTAREN) 1 % gel gel, Apply 4 g topically 2 (Two) Times a Day. Small amount to affected area, Disp: 100 g, Rfl: 0  •  meloxicam (MOBIC) 15 MG tablet, 1 PO Daily with food., Disp: 30 tablet, Rfl: 3  •  pantoprazole (PROTONIX) 40 MG EC tablet, Take 1 tablet by mouth Daily. Take 30 minutes before breakfast, Disp: 90 tablet, Rfl: 2  •  rosuvastatin (CRESTOR) 10 MG tablet, Take 1 tablet by mouth Daily., Disp: 90 tablet, Rfl: 3  •  venlafaxine XR (EFFEXOR-XR) 150 MG 24 hr capsule, Take 1 capsule by mouth Daily., Disp: 90 capsule, Rfl: 3     Objective     History of Present Illness   The patient is  here today for follow-up on hyperglycemia, essential hypertension, and hyperlipidemia.    Review of Systems   Constitutional: Negative for chills, fatigue and fever.        The patient has lost approximately 20 pounds intentionally through a low sugar, low starch diet over the last 6 months.   HENT: Negative.    Respiratory: Negative for cough, shortness of breath and wheezing.    Cardiovascular: Negative for chest pain, palpitations and leg swelling.   Gastrointestinal: Negative for abdominal pain, constipation and diarrhea.   Genitourinary: Negative.    Musculoskeletal: Negative.    Neurological: Negative.    Psychiatric/Behavioral:        The patient has a history of depression but is doing well on her current medications.       Physical Exam   Constitutional: She is oriented to person, place, and time. She appears well-developed and well-nourished.   Neck:   Carotid pulses normal   Cardiovascular: Normal rate and regular rhythm.    Grade 1/6 systolic murmur heard best at the right upper sternal border   Pulmonary/Chest: Effort normal and breath sounds normal. No respiratory distress. She has no wheezes. She has no rales.   Abdominal: Soft. Bowel sounds are normal.   Musculoskeletal: Normal range of motion.   Neurological: She is alert and oriented to person, place, and time.   Skin: Skin is warm and dry.   Psychiatric: She has a normal mood and affect.   Nursing note and vitals reviewed.      ASSESSMENT  #1 hyperglycemia, mild               #2 essential hypertension              #3 hyperlipidemia    DISCUSSION/SUMMARY   Initially the patient's blood pressure was 156/78 but upon recheck it was down to 136/72.  The patient states that at home her blood pressures typically run 130 to high 130s systolic and 70s diastolic.  CMP was normal except for elevated fasting blood sugar of 116 but the patient's hemoglobin A1c is only 5.52%.  The patient is on a low sugar, low starch diet and has lost about 20 pounds since  last visit.  Total cholesterol is 196, triglycerides 148, HDL cholesterol 75, and LDL cholesterol is 91.  The patient feels well and will continue her present medications.  I will check her again in 6 months.    PLAN  Recheck in 6 months with fasting CMP, lipid panel and HbA1c  No Follow-up on file.

## 2018-03-15 DIAGNOSIS — R12 HEARTBURN: ICD-10-CM

## 2018-03-15 DIAGNOSIS — K44.9 HIATAL HERNIA: ICD-10-CM

## 2018-03-15 DIAGNOSIS — K21.9 GASTROESOPHAGEAL REFLUX DISEASE WITHOUT ESOPHAGITIS: ICD-10-CM

## 2018-03-16 RX ORDER — PANTOPRAZOLE SODIUM 40 MG/1
TABLET, DELAYED RELEASE ORAL
Qty: 90 TABLET | Refills: 2 | Status: SHIPPED | OUTPATIENT
Start: 2018-03-16 | End: 2018-07-24 | Stop reason: SDUPTHER

## 2018-06-06 ENCOUNTER — TELEPHONE (OUTPATIENT)
Dept: FAMILY MEDICINE CLINIC | Facility: CLINIC | Age: 68
End: 2018-06-06

## 2018-06-06 DIAGNOSIS — R73.9 HYPERGLYCEMIA: ICD-10-CM

## 2018-06-06 DIAGNOSIS — E78.5 HYPERLIPIDEMIA, UNSPECIFIED HYPERLIPIDEMIA TYPE: ICD-10-CM

## 2018-06-06 DIAGNOSIS — I10 ESSENTIAL HYPERTENSION: Primary | ICD-10-CM

## 2018-06-06 LAB
ALBUMIN SERPL-MCNC: 4.5 G/DL (ref 3.5–5.2)
ALBUMIN/GLOB SERPL: 1.4 G/DL
ALP SERPL-CCNC: 48 U/L (ref 39–117)
ALT SERPL-CCNC: 25 U/L (ref 1–33)
AST SERPL-CCNC: 20 U/L (ref 1–32)
BILIRUB SERPL-MCNC: 0.3 MG/DL (ref 0.1–1.2)
BUN SERPL-MCNC: 20 MG/DL (ref 8–23)
BUN/CREAT SERPL: 29 (ref 7–25)
CALCIUM SERPL-MCNC: 10.4 MG/DL (ref 8.6–10.5)
CHLORIDE SERPL-SCNC: 103 MMOL/L (ref 98–107)
CHOLEST SERPL-MCNC: 220 MG/DL (ref 0–200)
CO2 SERPL-SCNC: 27.7 MMOL/L (ref 22–29)
CREAT SERPL-MCNC: 0.69 MG/DL (ref 0.57–1)
GFR SERPLBLD CREATININE-BSD FMLA CKD-EPI: 103 ML/MIN/1.73
GFR SERPLBLD CREATININE-BSD FMLA CKD-EPI: 85 ML/MIN/1.73
GLOBULIN SER CALC-MCNC: 3.2 GM/DL
GLUCOSE SERPL-MCNC: 110 MG/DL (ref 65–99)
HBA1C MFR BLD: 5.5 % (ref 4.8–5.6)
HDLC SERPL-MCNC: 95 MG/DL (ref 40–60)
LDLC SERPL CALC-MCNC: 104 MG/DL (ref 0–100)
LDLC/HDLC SERPL: 1.1 {RATIO}
POTASSIUM SERPL-SCNC: 4.7 MMOL/L (ref 3.5–5.2)
PROT SERPL-MCNC: 7.7 G/DL (ref 6–8.5)
SODIUM SERPL-SCNC: 145 MMOL/L (ref 136–145)
TRIGL SERPL-MCNC: 103 MG/DL (ref 0–150)
VLDLC SERPL CALC-MCNC: 20.6 MG/DL (ref 5–40)

## 2018-06-06 RX ORDER — CANDESARTAN 16 MG/1
16 TABLET ORAL DAILY
Qty: 90 TABLET | Refills: 3 | Status: SHIPPED | OUTPATIENT
Start: 2018-06-06 | End: 2019-06-14 | Stop reason: SDUPTHER

## 2018-06-06 RX ORDER — VENLAFAXINE HYDROCHLORIDE 150 MG/1
150 CAPSULE, EXTENDED RELEASE ORAL DAILY
Qty: 90 CAPSULE | Refills: 3 | Status: SHIPPED | OUTPATIENT
Start: 2018-06-06 | End: 2019-04-27 | Stop reason: SDUPTHER

## 2018-06-06 RX ORDER — ROSUVASTATIN CALCIUM 10 MG/1
10 TABLET, COATED ORAL DAILY
Qty: 90 TABLET | Refills: 3 | Status: SHIPPED | OUTPATIENT
Start: 2018-06-06 | End: 2019-05-17 | Stop reason: SDUPTHER

## 2018-06-06 NOTE — TELEPHONE ENCOUNTER
DONE    ----- Message from Francisco J Lay sent at 6/6/2018  8:15 AM EDT -----  PT NEEDS SCRIPT REFILL FOR     candesartan (ATACAND) 16 MG Take 1 tablet by mouth Daily #90  venlafaxine XR (EFFEXOR-XR) 150 MG 24 hr capsule Take 1 capsule by mouth Daily #90  rosuvastatin (CRESTOR) 10 MG Take 1 capsule by mouth Daily #90    PLEASE SEND TO A NEW PHARMACY [facility].  ALSO PLEASE UPDATE PT CHART WITH NEW PHARMACY INFORMATION.    IF YOU HAVE ANY QUESTIONS PLEASE CONTACT PT -[phone]

## 2018-06-14 ENCOUNTER — OFFICE VISIT (OUTPATIENT)
Dept: FAMILY MEDICINE CLINIC | Facility: CLINIC | Age: 68
End: 2018-06-14

## 2018-06-14 VITALS
HEIGHT: 65 IN | WEIGHT: 143 LBS | TEMPERATURE: 97.4 F | OXYGEN SATURATION: 94 % | DIASTOLIC BLOOD PRESSURE: 74 MMHG | RESPIRATION RATE: 18 BRPM | BODY MASS INDEX: 23.82 KG/M2 | HEART RATE: 95 BPM | SYSTOLIC BLOOD PRESSURE: 134 MMHG

## 2018-06-14 DIAGNOSIS — E78.2 MIXED HYPERLIPIDEMIA: ICD-10-CM

## 2018-06-14 DIAGNOSIS — R01.1 HEART MURMUR, SYSTOLIC: ICD-10-CM

## 2018-06-14 DIAGNOSIS — R73.9 HYPERGLYCEMIA: ICD-10-CM

## 2018-06-14 DIAGNOSIS — Z23 NEED FOR VACCINATION: Primary | ICD-10-CM

## 2018-06-14 DIAGNOSIS — I10 BENIGN ESSENTIAL HTN: ICD-10-CM

## 2018-06-14 LAB
HCV AB S/CO SERPL IA: <0.1 S/CO RATIO (ref 0–0.9)
Lab: NORMAL
WRITTEN AUTHORIZATION: NORMAL

## 2018-06-14 PROCEDURE — 99213 OFFICE O/P EST LOW 20 MIN: CPT

## 2018-06-14 PROCEDURE — 90732 PPSV23 VACC 2 YRS+ SUBQ/IM: CPT

## 2018-06-14 PROCEDURE — G0009 ADMIN PNEUMOCOCCAL VACCINE: HCPCS

## 2018-06-14 NOTE — PROGRESS NOTES
Subjective   Teagan Cates is a 68 y.o. female. Patient is here today for   Chief Complaint   Patient presents with   • Hyperglycemia   • Hyperlipidemia   • Hypertension          Vitals:    06/14/18 0823   BP: 134/74   Pulse: 95   Resp: 18   Temp: 97.4 °F (36.3 °C)   SpO2: 94%     The following portions of the patient's history were reviewed and updated as appropriate: allergies, current medications, past family history, past medical history, past social history, past surgical history and problem list.    Past Medical History:   Diagnosis Date   • Anxiety    • GERD (gastroesophageal reflux disease)    • Hyperlipidemia    • Hypertension       Allergies   Allergen Reactions   • Sulfa Antibiotics Anaphylaxis   • Iodinated Diagnostic Agents Rash      Social History     Social History   • Marital status:      Spouse name: N/A   • Number of children: N/A   • Years of education: N/A     Occupational History   • Not on file.     Social History Main Topics   • Smoking status: Never Smoker   • Smokeless tobacco: Not on file   • Alcohol use Yes      Comment: SOCIAL   • Drug use: No   • Sexual activity: Defer     Other Topics Concern   • Not on file     Social History Narrative   • No narrative on file        Current Outpatient Prescriptions:   •  candesartan (ATACAND) 16 MG tablet, Take 1 tablet by mouth Daily., Disp: 90 tablet, Rfl: 3  •  diclofenac (VOLTAREN) 1 % gel gel, Apply 4 g topically 2 (Two) Times a Day. Small amount to affected area, Disp: 100 g, Rfl: 0  •  meloxicam (MOBIC) 15 MG tablet, 1 PO Daily with food., Disp: 30 tablet, Rfl: 3  •  pantoprazole (PROTONIX) 40 MG EC tablet, TAKE 1 TABLET DAILY 30     MINUTES BEFORE BREAKFAST, Disp: 90 tablet, Rfl: 2  •  rosuvastatin (CRESTOR) 10 MG tablet, Take 1 tablet by mouth Daily., Disp: 90 tablet, Rfl: 3  •  venlafaxine XR (EFFEXOR-XR) 150 MG 24 hr capsule, Take 1 capsule by mouth Daily., Disp: 90 capsule, Rfl: 3     Objective     History of Present Illness   The  patient is here today for follow-up on  hyperglycemia, essential hypertension, hyperlipidemia    Review of Systems   Constitutional: Negative.    HENT: Negative.    Respiratory: Negative for cough, shortness of breath and wheezing.    Cardiovascular: Negative for chest pain, palpitations and leg swelling.   Gastrointestinal: Negative for blood in stool, constipation and diarrhea.   Genitourinary: Negative.    Musculoskeletal:        Minor aches and pains only   Neurological: Negative for numbness and headaches.   Hematological: Negative.    Psychiatric/Behavioral:        The patient does have a history of depression but is doing well on her current dose of Effexor        Physical Exam   Constitutional: She is oriented to person, place, and time. She appears well-developed and well-nourished.   Neck:   Carotid pulses normal   Cardiovascular: Normal rate and regular rhythm.    Grade 1-2/6 systolic murmur heard at the upper sternal border greater on the right side   Pulmonary/Chest: Effort normal and breath sounds normal. No respiratory distress. She has no wheezes. She has no rales.   Abdominal: Soft. Bowel sounds are normal.   Musculoskeletal: Normal range of motion. She exhibits no edema.   Neurological: She is alert and oriented to person, place, and time.   Skin: Skin is warm and dry.   Psychiatric: She has a normal mood and affect.   Nursing note and vitals reviewed.      ASSESSMENT  #1 mild hyperglycemia                    #2 essential hypertension                  #3 hyperlipidemia                 #4 systolic heart murmur    DISCUSSION/SUMMARY   The patient's vital signs are normal.  CMP is normal except for elevated fasting blood sugar of 110 and the patient's hemoglobin A1c is only 5.5%.  The patient 's mother did have type 2 diabetes mellitus and the patient will continue to observe a low sugar, low starch and low saturated fat diet.  Total cholesterol is 220, triglycerides 103, HDL cholesterol 95 and LDL  cholesterol is 104.  Patient is on Crestor 10 mg daily.  The patient's total cholesterol did go up but her HDL cholesterol is 95 and her LDL cholesterol is only borderline high so I will keep her on her current medications and we will see what her profile is like on next visit.  Hepatitis C antibody titer level was normal.  On examination the patient does have a grade 1-2 systolic murmur heard best at the upper sternal border, right greater than left.  The patient did have an echocardiogram approximately 12 years ago which showed no significant problems.  She had trace mitral regurgitation and trace tricuspid regurgitation area and even though the patient is asymptomatic.  He should recheck an echocardiogram to update the status of her valves.  The patient had the Prevnar 13 vaccine one year ago and will have a Pneumovax vaccine today.  The patient was advised to consider getting a shingles vaccine, preferably the new shingles vaccine.    PLAN  Recheck in 6 months with fasting CMP and lipid panel  No Follow-up on file.

## 2018-06-25 ENCOUNTER — HOSPITAL ENCOUNTER (OUTPATIENT)
Dept: CARDIOLOGY | Facility: HOSPITAL | Age: 68
Discharge: HOME | End: 2018-06-25

## 2018-06-25 VITALS
HEART RATE: 93 BPM | DIASTOLIC BLOOD PRESSURE: 86 MMHG | HEIGHT: 65 IN | BODY MASS INDEX: 23.82 KG/M2 | OXYGEN SATURATION: 98 % | WEIGHT: 143 LBS | SYSTOLIC BLOOD PRESSURE: 151 MMHG

## 2018-06-25 DIAGNOSIS — R01.1 HEART MURMUR, SYSTOLIC: ICD-10-CM

## 2018-06-25 LAB
AORTIC DIMENSIONLESS INDEX: 0.7 (DI)
BH CV ECHO MEAS - ACS: 1.3 CM
BH CV ECHO MEAS - AI DEC SLOPE: 298 CM/SEC^2
BH CV ECHO MEAS - AI MAX PG: 86.9 MMHG
BH CV ECHO MEAS - AI MAX VEL: 466 CM/SEC
BH CV ECHO MEAS - AI P1/2T: 458 MSEC
BH CV ECHO MEAS - AO MAX PG (FULL): 8.7 MMHG
BH CV ECHO MEAS - AO MAX PG: 15.4 MMHG
BH CV ECHO MEAS - AO MEAN PG (FULL): 4 MMHG
BH CV ECHO MEAS - AO MEAN PG: 7 MMHG
BH CV ECHO MEAS - AO ROOT AREA (BSA CORRECTED): 1.6
BH CV ECHO MEAS - AO ROOT AREA: 5.7 CM^2
BH CV ECHO MEAS - AO ROOT DIAM: 2.7 CM
BH CV ECHO MEAS - AO V2 MAX: 196 CM/SEC
BH CV ECHO MEAS - AO V2 MEAN: 125 CM/SEC
BH CV ECHO MEAS - AO V2 VTI: 37.2 CM
BH CV ECHO MEAS - ASC AORTA: 2.6 CM
BH CV ECHO MEAS - AVA(I,A): 1.7 CM^2
BH CV ECHO MEAS - AVA(I,D): 1.7 CM^2
BH CV ECHO MEAS - AVA(V,A): 1.5 CM^2
BH CV ECHO MEAS - AVA(V,D): 1.5 CM^2
BH CV ECHO MEAS - BSA(HAYCOCK): 1.7 M^2
BH CV ECHO MEAS - BSA: 1.7 M^2
BH CV ECHO MEAS - BZI_BMI: 23.8 KILOGRAMS/M^2
BH CV ECHO MEAS - BZI_METRIC_HEIGHT: 165.1 CM
BH CV ECHO MEAS - BZI_METRIC_WEIGHT: 64.9 KG
BH CV ECHO MEAS - CONTRAST EF (2CH): 65.3 ML/M^2
BH CV ECHO MEAS - CONTRAST EF 4CH: 68.8 ML/M^2
BH CV ECHO MEAS - EDV(CUBED): 59.3 ML
BH CV ECHO MEAS - EDV(MOD-SP2): 72 ML
BH CV ECHO MEAS - EDV(MOD-SP4): 64 ML
BH CV ECHO MEAS - EDV(TEICH): 65.9 ML
BH CV ECHO MEAS - EF(CUBED): 76.7 %
BH CV ECHO MEAS - EF(MOD-BP): 67 %
BH CV ECHO MEAS - EF(MOD-SP2): 65.3 %
BH CV ECHO MEAS - EF(MOD-SP4): 68.8 %
BH CV ECHO MEAS - EF(TEICH): 69.4 %
BH CV ECHO MEAS - ESV(CUBED): 13.8 ML
BH CV ECHO MEAS - ESV(MOD-SP2): 25 ML
BH CV ECHO MEAS - ESV(MOD-SP4): 20 ML
BH CV ECHO MEAS - ESV(TEICH): 20.2 ML
BH CV ECHO MEAS - FS: 38.5 %
BH CV ECHO MEAS - IVS/LVPW: 1
BH CV ECHO MEAS - IVSD: 1 CM
BH CV ECHO MEAS - LAT PEAK E' VEL: 7 CM/SEC
BH CV ECHO MEAS - LV DIASTOLIC VOL/BSA (35-75): 37.3 ML/M^2
BH CV ECHO MEAS - LV MASS(C)D: 122.1 GRAMS
BH CV ECHO MEAS - LV MASS(C)DI: 71.2 GRAMS/M^2
BH CV ECHO MEAS - LV MAX PG: 6.7 MMHG
BH CV ECHO MEAS - LV MEAN PG: 3 MMHG
BH CV ECHO MEAS - LV SYSTOLIC VOL/BSA (12-30): 11.7 ML/M^2
BH CV ECHO MEAS - LV V1 MAX: 129 CM/SEC
BH CV ECHO MEAS - LV V1 MEAN: 86.9 CM/SEC
BH CV ECHO MEAS - LV V1 VTI: 27.2 CM
BH CV ECHO MEAS - LVIDD: 3.9 CM
BH CV ECHO MEAS - LVIDS: 2.4 CM
BH CV ECHO MEAS - LVLD AP2: 7.5 CM
BH CV ECHO MEAS - LVLD AP4: 8 CM
BH CV ECHO MEAS - LVLS AP2: 6.7 CM
BH CV ECHO MEAS - LVLS AP4: 6.5 CM
BH CV ECHO MEAS - LVOT AREA (M): 2.3 CM^2
BH CV ECHO MEAS - LVOT AREA: 2.3 CM^2
BH CV ECHO MEAS - LVOT DIAM: 1.7 CM
BH CV ECHO MEAS - LVPWD: 1 CM
BH CV ECHO MEAS - MED PEAK E' VEL: 6 CM/SEC
BH CV ECHO MEAS - MV A DUR: 0.17 SEC
BH CV ECHO MEAS - MV A MAX VEL: 118 CM/SEC
BH CV ECHO MEAS - MV DEC SLOPE: 659 CM/SEC^2
BH CV ECHO MEAS - MV DEC TIME: 0.17 SEC
BH CV ECHO MEAS - MV E MAX VEL: 90.7 CM/SEC
BH CV ECHO MEAS - MV E/A: 0.77
BH CV ECHO MEAS - MV MAX PG: 9.5 MMHG
BH CV ECHO MEAS - MV MEAN PG: 4 MMHG
BH CV ECHO MEAS - MV P1/2T MAX VEL: 117 CM/SEC
BH CV ECHO MEAS - MV P1/2T: 52 MSEC
BH CV ECHO MEAS - MV V2 MAX: 154 CM/SEC
BH CV ECHO MEAS - MV V2 MEAN: 87.3 CM/SEC
BH CV ECHO MEAS - MV V2 VTI: 29.9 CM
BH CV ECHO MEAS - MVA P1/2T LCG: 1.9 CM^2
BH CV ECHO MEAS - MVA(P1/2T): 4.2 CM^2
BH CV ECHO MEAS - MVA(VTI): 2.1 CM^2
BH CV ECHO MEAS - PA ACC TIME: 0.09 SEC
BH CV ECHO MEAS - PA MAX PG (FULL): 7.5 MMHG
BH CV ECHO MEAS - PA MAX PG: 11.3 MMHG
BH CV ECHO MEAS - PA PR(ACCEL): 37.6 MMHG
BH CV ECHO MEAS - PA V2 MAX: 168 CM/SEC
BH CV ECHO MEAS - PULM A REVS DUR: 0.13 SEC
BH CV ECHO MEAS - PULM A REVS VEL: 50.4 CM/SEC
BH CV ECHO MEAS - PULM DIAS VEL: 39.3 CM/SEC
BH CV ECHO MEAS - PULM S/D: 1.6
BH CV ECHO MEAS - PULM SYS VEL: 64.5 CM/SEC
BH CV ECHO MEAS - PVA(V,A): 1.3 CM^2
BH CV ECHO MEAS - PVA(V,D): 1.3 CM^2
BH CV ECHO MEAS - QP/QS: 0.69
BH CV ECHO MEAS - RV MAX PG: 3.7 MMHG
BH CV ECHO MEAS - RV MEAN PG: 2 MMHG
BH CV ECHO MEAS - RV V1 MAX: 96.8 CM/SEC
BH CV ECHO MEAS - RV V1 MEAN: 55 CM/SEC
BH CV ECHO MEAS - RV V1 VTI: 18.9 CM
BH CV ECHO MEAS - RVOT AREA: 2.3 CM^2
BH CV ECHO MEAS - RVOT DIAM: 1.7 CM
BH CV ECHO MEAS - SI(AO): 124.2 ML/M^2
BH CV ECHO MEAS - SI(CUBED): 26.5 ML/M^2
BH CV ECHO MEAS - SI(LVOT): 36 ML/M^2
BH CV ECHO MEAS - SI(MOD-SP2): 27.4 ML/M^2
BH CV ECHO MEAS - SI(MOD-SP4): 25.7 ML/M^2
BH CV ECHO MEAS - SI(TEICH): 26.7 ML/M^2
BH CV ECHO MEAS - SV(AO): 213 ML
BH CV ECHO MEAS - SV(CUBED): 45.5 ML
BH CV ECHO MEAS - SV(LVOT): 61.7 ML
BH CV ECHO MEAS - SV(MOD-SP2): 47 ML
BH CV ECHO MEAS - SV(MOD-SP4): 44 ML
BH CV ECHO MEAS - SV(RVOT): 42.9 ML
BH CV ECHO MEAS - SV(TEICH): 45.8 ML
BH CV ECHO MEAS - TAPSE (>1.6): 2 CM2
BH CV ECHO MEASUREMENTS AVERAGE E/E' RATIO: 13.95
BH CV VAS BP RIGHT ARM: NORMAL MMHG
BH CV XLRA - RV BASE: 3.1 CM
BH CV XLRA - TDI S': 17 CM/SEC
LEFT ATRIUM VOLUME INDEX: 21 ML/M2
LEFT ATRIUM VOLUME: 35 CM3
MAXIMAL PREDICTED HEART RATE: 153 BPM
STRESS TARGET HR: 130 BPM

## 2018-06-25 PROCEDURE — 93306 TTE W/DOPPLER COMPLETE: CPT

## 2018-06-25 PROCEDURE — 93306 TTE W/DOPPLER COMPLETE: CPT | Performed by: INTERNAL MEDICINE

## 2018-06-28 ENCOUNTER — TELEPHONE (OUTPATIENT)
Dept: FAMILY MEDICINE CLINIC | Facility: CLINIC | Age: 68
End: 2018-06-28

## 2018-06-28 NOTE — TELEPHONE ENCOUNTER
Patient notified.     ----- Message from Jordan Jerez MD sent at 6/27/2018 12:56 PM EDT -----  Tell patient that her echocardiogram did not show any serious abnormalities.  She has just a trace to mild leakage of the aortic, mitral and tricuspid valves.  This is quite common and does not require any treatment at this time.  We should repeat her echocardiogram in approximately 1-2 years for follow-up

## 2018-07-24 DIAGNOSIS — R12 HEARTBURN: ICD-10-CM

## 2018-07-24 DIAGNOSIS — K44.9 HIATAL HERNIA: ICD-10-CM

## 2018-07-24 DIAGNOSIS — K21.9 GASTROESOPHAGEAL REFLUX DISEASE WITHOUT ESOPHAGITIS: ICD-10-CM

## 2018-07-24 RX ORDER — PANTOPRAZOLE SODIUM 40 MG/1
40 TABLET, DELAYED RELEASE ORAL DAILY
Qty: 90 TABLET | Refills: 3 | Status: SHIPPED | OUTPATIENT
Start: 2018-07-24 | End: 2019-07-15 | Stop reason: SDUPTHER

## 2018-11-28 ENCOUNTER — APPOINTMENT (OUTPATIENT)
Dept: WOMENS IMAGING | Facility: HOSPITAL | Age: 68
End: 2018-11-28

## 2018-11-28 PROCEDURE — 77063 BREAST TOMOSYNTHESIS BI: CPT | Performed by: RADIOLOGY

## 2018-11-28 PROCEDURE — 77067 SCR MAMMO BI INCL CAD: CPT | Performed by: RADIOLOGY

## 2018-12-12 DIAGNOSIS — E78.5 HYPERLIPIDEMIA, UNSPECIFIED HYPERLIPIDEMIA TYPE: Primary | ICD-10-CM

## 2018-12-12 DIAGNOSIS — I10 ESSENTIAL HYPERTENSION: ICD-10-CM

## 2018-12-13 LAB
ALBUMIN SERPL-MCNC: 4.2 G/DL (ref 3.5–5.2)
ALBUMIN/GLOB SERPL: 1.6 G/DL
ALP SERPL-CCNC: 47 U/L (ref 39–117)
ALT SERPL-CCNC: 36 U/L (ref 1–33)
AST SERPL-CCNC: 26 U/L (ref 1–32)
BILIRUB SERPL-MCNC: 0.4 MG/DL (ref 0.1–1.2)
BUN SERPL-MCNC: 15 MG/DL (ref 8–23)
BUN/CREAT SERPL: 22.1 (ref 7–25)
CALCIUM SERPL-MCNC: 9.6 MG/DL (ref 8.6–10.5)
CHLORIDE SERPL-SCNC: 103 MMOL/L (ref 98–107)
CHOLEST SERPL-MCNC: 194 MG/DL (ref 0–200)
CO2 SERPL-SCNC: 28.4 MMOL/L (ref 22–29)
CREAT SERPL-MCNC: 0.68 MG/DL (ref 0.57–1)
GLOBULIN SER CALC-MCNC: 2.7 GM/DL
GLUCOSE SERPL-MCNC: 98 MG/DL (ref 65–99)
HDLC SERPL-MCNC: 74 MG/DL (ref 40–60)
LDLC SERPL CALC-MCNC: 96 MG/DL (ref 0–100)
LDLC/HDLC SERPL: 1.3 {RATIO}
POTASSIUM SERPL-SCNC: 4.5 MMOL/L (ref 3.5–5.2)
PROT SERPL-MCNC: 6.9 G/DL (ref 6–8.5)
SODIUM SERPL-SCNC: 145 MMOL/L (ref 136–145)
TRIGL SERPL-MCNC: 120 MG/DL (ref 0–150)
VLDLC SERPL CALC-MCNC: 24 MG/DL (ref 5–40)

## 2018-12-20 ENCOUNTER — OFFICE VISIT (OUTPATIENT)
Dept: FAMILY MEDICINE CLINIC | Facility: CLINIC | Age: 68
End: 2018-12-20

## 2018-12-20 VITALS
BODY MASS INDEX: 24.99 KG/M2 | HEIGHT: 65 IN | WEIGHT: 150 LBS | SYSTOLIC BLOOD PRESSURE: 142 MMHG | TEMPERATURE: 97.4 F | RESPIRATION RATE: 18 BRPM | OXYGEN SATURATION: 93 % | HEART RATE: 104 BPM | DIASTOLIC BLOOD PRESSURE: 74 MMHG

## 2018-12-20 DIAGNOSIS — E78.2 MIXED HYPERLIPIDEMIA: ICD-10-CM

## 2018-12-20 DIAGNOSIS — I10 BENIGN ESSENTIAL HTN: Primary | ICD-10-CM

## 2018-12-20 PROCEDURE — 99213 OFFICE O/P EST LOW 20 MIN: CPT

## 2018-12-20 NOTE — PROGRESS NOTES
Subjective   Teagan Cates is a 68 y.o. female. Patient is here today for   Chief Complaint   Patient presents with   • Hyperlipidemia   • Hypertension          Vitals:    12/20/18 0824   BP: 142/74   Pulse: 104   Resp: 18   Temp: 97.4 °F (36.3 °C)   SpO2: 93%     The following portions of the patient's history were reviewed and updated as appropriate: allergies, current medications, past family history, past medical history, past social history, past surgical history and problem list.    Past Medical History:   Diagnosis Date   • Anxiety    • GERD (gastroesophageal reflux disease)    • Hyperlipidemia    • Hypertension       Allergies   Allergen Reactions   • Sulfa Antibiotics Anaphylaxis   • Iodinated Diagnostic Agents Rash      Social History     Socioeconomic History   • Marital status:      Spouse name: Not on file   • Number of children: Not on file   • Years of education: Not on file   • Highest education level: Not on file   Social Needs   • Financial resource strain: Not on file   • Food insecurity - worry: Not on file   • Food insecurity - inability: Not on file   • Transportation needs - medical: Not on file   • Transportation needs - non-medical: Not on file   Occupational History   • Not on file   Tobacco Use   • Smoking status: Never Smoker   Substance and Sexual Activity   • Alcohol use: Yes     Comment: SOCIAL   • Drug use: No   • Sexual activity: Defer   Other Topics Concern   • Not on file   Social History Narrative   • Not on file        Current Outpatient Medications:   •  candesartan (ATACAND) 16 MG tablet, Take 1 tablet by mouth Daily., Disp: 90 tablet, Rfl: 3  •  diclofenac (VOLTAREN) 1 % gel gel, Apply 4 g topically 2 (Two) Times a Day. Small amount to affected area, Disp: 100 g, Rfl: 0  •  meloxicam (MOBIC) 15 MG tablet, 1 PO Daily with food., Disp: 30 tablet, Rfl: 3  •  pantoprazole (PROTONIX) 40 MG EC tablet, Take 1 tablet by mouth Daily., Disp: 90 tablet, Rfl: 3  •  rosuvastatin  (CRESTOR) 10 MG tablet, Take 1 tablet by mouth Daily., Disp: 90 tablet, Rfl: 3  •  venlafaxine XR (EFFEXOR-XR) 150 MG 24 hr capsule, Take 1 capsule by mouth Daily., Disp: 90 capsule, Rfl: 3     Objective     History of Present Illness    The patient is here today for follow-up on essential hypertension and hyperlipidemia    Review of Systems   Constitutional: Negative for activity change, appetite change, chills, fever and unexpected weight change.   HENT: Negative.    Respiratory: Negative for cough, shortness of breath and wheezing.    Cardiovascular: Negative for chest pain, palpitations and leg swelling.   Gastrointestinal:        The patient does have a history of a large hiatal hernia with reflux esophagitis.  The patient is currently on a proton pump inhibitor and does well most of the time but does have occasional breakthrough indigestion at night.   Genitourinary: Negative.    Musculoskeletal:        Minor aches and pains only   Neurological: Negative for weakness, numbness and headaches.   Psychiatric/Behavioral:        The patient does have a history of depression but is doing well on her current medication.       Physical Exam   Constitutional: She is oriented to person, place, and time. She appears well-developed and well-nourished.   Neck:   Carotid pulses normal   Cardiovascular: Normal rate, regular rhythm and normal heart sounds.   Pulmonary/Chest: Effort normal and breath sounds normal. No stridor. No respiratory distress. She has no wheezes.   Abdominal: Soft. Bowel sounds are normal.   Musculoskeletal: Normal range of motion. She exhibits no edema.   Neurological: She is alert and oriented to person, place, and time.   Skin: Skin is warm and dry.   Psychiatric: She has a normal mood and affect.   Nursing note and vitals reviewed.      ASSESSMENT  #1 essential hypertension                     #2 hyperlipidemia    DISCUSSION/SUMMARY   Blood pressure by me was 136/72.  The patient states that her  blood pressures at home are typically in the 120s to low 130s systolic.  CMP was normal except for minimal elevation of the ALT at 36.  Total cholesterol is 194, triglycerides 120, HDL cholesterol 74 and LDL cholesterol is 96.  The patient will continue her rosuvastatin 10 mg daily as well as her candesartan 16 mg daily.    The patient does have problems with chronic indigestion and was found to have a large hiatal hernia and some reflux esophagitis on EGD done within the last 2 years.  The patient is on pantoprazole for this.  She does get breakthrough indigestion occasionally and has to take Tums at night.  The patient will contact her gastroenterologist for follow-up on this.    PLAN  Recheck in 6 months with fasting CMP and lipid panel  No Follow-up on file.

## 2019-04-29 RX ORDER — VENLAFAXINE HYDROCHLORIDE 150 MG/1
150 CAPSULE, EXTENDED RELEASE ORAL DAILY
Qty: 90 CAPSULE | Refills: 3 | Status: SHIPPED | OUTPATIENT
Start: 2019-04-29 | End: 2019-10-18 | Stop reason: SDUPTHER

## 2019-05-20 RX ORDER — ROSUVASTATIN CALCIUM 10 MG/1
10 TABLET, COATED ORAL DAILY
Qty: 90 TABLET | Refills: 0 | Status: SHIPPED | OUTPATIENT
Start: 2019-05-20 | End: 2019-08-18 | Stop reason: SDUPTHER

## 2019-05-30 DIAGNOSIS — E78.2 MIXED HYPERLIPIDEMIA: Primary | ICD-10-CM

## 2019-06-05 LAB
ALBUMIN SERPL-MCNC: 4.4 G/DL (ref 3.5–5.2)
ALBUMIN/GLOB SERPL: 1.5 G/DL
ALP SERPL-CCNC: 50 U/L (ref 39–117)
ALT SERPL-CCNC: 35 U/L (ref 1–33)
AST SERPL-CCNC: 30 U/L (ref 1–32)
BILIRUB SERPL-MCNC: 0.6 MG/DL (ref 0.2–1.2)
BUN SERPL-MCNC: 13 MG/DL (ref 8–23)
BUN/CREAT SERPL: 20 (ref 7–25)
CALCIUM SERPL-MCNC: 10.3 MG/DL (ref 8.6–10.5)
CHLORIDE SERPL-SCNC: 104 MMOL/L (ref 98–107)
CHOLEST SERPL-MCNC: 176 MG/DL (ref 0–200)
CO2 SERPL-SCNC: 27.6 MMOL/L (ref 22–29)
CREAT SERPL-MCNC: 0.65 MG/DL (ref 0.57–1)
GLOBULIN SER CALC-MCNC: 3 GM/DL
GLUCOSE SERPL-MCNC: 103 MG/DL (ref 65–99)
HDLC SERPL-MCNC: 83 MG/DL (ref 40–60)
LDLC SERPL CALC-MCNC: 63 MG/DL (ref 0–100)
LDLC/HDLC SERPL: 0.76 {RATIO}
POTASSIUM SERPL-SCNC: 4.9 MMOL/L (ref 3.5–5.2)
PROT SERPL-MCNC: 7.4 G/DL (ref 6–8.5)
SODIUM SERPL-SCNC: 143 MMOL/L (ref 136–145)
TRIGL SERPL-MCNC: 150 MG/DL (ref 0–150)
VLDLC SERPL CALC-MCNC: 30 MG/DL

## 2019-06-11 ENCOUNTER — OFFICE VISIT (OUTPATIENT)
Dept: FAMILY MEDICINE CLINIC | Facility: CLINIC | Age: 69
End: 2019-06-11

## 2019-06-11 VITALS
HEART RATE: 88 BPM | HEIGHT: 65 IN | RESPIRATION RATE: 16 BRPM | SYSTOLIC BLOOD PRESSURE: 142 MMHG | OXYGEN SATURATION: 98 % | TEMPERATURE: 98.1 F | BODY MASS INDEX: 24.43 KG/M2 | WEIGHT: 146.6 LBS | DIASTOLIC BLOOD PRESSURE: 80 MMHG

## 2019-06-11 DIAGNOSIS — I10 BENIGN ESSENTIAL HTN: ICD-10-CM

## 2019-06-11 DIAGNOSIS — K44.9 HH (HIATUS HERNIA): Primary | ICD-10-CM

## 2019-06-11 DIAGNOSIS — E78.2 MIXED HYPERLIPIDEMIA: ICD-10-CM

## 2019-06-11 PROCEDURE — 99213 OFFICE O/P EST LOW 20 MIN: CPT

## 2019-06-11 NOTE — PROGRESS NOTES
Subjective   Teagan Cates is a 69 y.o. female. Patient is here today for   Chief Complaint   Patient presents with   • Hyperlipidemia     HYPERTENSION- FOLLOW UP LABS          Vitals:    06/11/19 0853   BP: 142/80   Pulse: 88   Resp: 16   Temp: 98.1 °F (36.7 °C)   SpO2: 98%     The following portions of the patient's history were reviewed and updated as appropriate: allergies, current medications, past family history, past medical history, past social history, past surgical history and problem list.    Past Medical History:   Diagnosis Date   • Anxiety    • GERD (gastroesophageal reflux disease)    • Hyperlipidemia    • Hypertension       Allergies   Allergen Reactions   • Sulfa Antibiotics Anaphylaxis   • Iodinated Diagnostic Agents Rash      Social History     Socioeconomic History   • Marital status:      Spouse name: Not on file   • Number of children: Not on file   • Years of education: Not on file   • Highest education level: Not on file   Tobacco Use   • Smoking status: Never Smoker   Substance and Sexual Activity   • Alcohol use: Yes     Comment: SOCIAL   • Drug use: No   • Sexual activity: Defer        Current Outpatient Medications:   •  candesartan (ATACAND) 16 MG tablet, Take 1 tablet by mouth Daily., Disp: 90 tablet, Rfl: 3  •  diclofenac (VOLTAREN) 1 % gel gel, Apply 4 g topically 2 (Two) Times a Day. Small amount to affected area, Disp: 100 g, Rfl: 0  •  meloxicam (MOBIC) 15 MG tablet, 1 PO Daily with food., Disp: 30 tablet, Rfl: 3  •  pantoprazole (PROTONIX) 40 MG EC tablet, Take 1 tablet by mouth Daily., Disp: 90 tablet, Rfl: 3  •  rosuvastatin (CRESTOR) 10 MG tablet, TAKE 1 TABLET BY MOUTH DAILY, Disp: 90 tablet, Rfl: 0  •  venlafaxine XR (EFFEXOR-XR) 150 MG 24 hr capsule, Take 1 capsule by mouth Daily., Disp: 90 capsule, Rfl: 3     Objective     History of Present Illness   The patient is here today for follow-up on essential hypertension and hyperlipidemia    Review of Systems    Constitutional: Negative.    HENT: Negative.    Respiratory: Negative for cough, shortness of breath and wheezing.    Cardiovascular: Negative for chest pain, palpitations and leg swelling.   Gastrointestinal: Negative for abdominal pain, blood in stool, constipation and diarrhea.        The patient was found to have a hiatal hernia and some esophagitis but no Pedro's esophagus last year.  She is on generic Protonix and doing well.   Genitourinary: Negative.    Musculoskeletal:        Mild aches and pains only   Neurological: Negative.    Hematological: Negative.    Psychiatric/Behavioral:        The patient does have a history of mixed anxiety depressive disorder but is doing well on her current dose of generic Effexor        Physical Exam   Constitutional: She is oriented to person, place, and time. She appears well-developed and well-nourished.   Neck:   Carotid pulses normal   Cardiovascular: Normal rate, regular rhythm and normal heart sounds.   Pulmonary/Chest: Breath sounds normal. No respiratory distress. She has no wheezes. She has no rales.   Abdominal: Soft. Bowel sounds are normal.   Musculoskeletal: Normal range of motion. She exhibits no edema.   Neurological: She is alert and oriented to person, place, and time.   Skin: Skin is warm and dry.   Psychiatric: She has a normal mood and affect.   Nursing note and vitals reviewed.      ASSESSMENT  #1 essential hypertension                       #2 hyperlipidemia                #3 hiatal hernia    DISCUSSION/SUMMARY   The patient's blood pressure is borderline elevated at 142/80.  I have asked her to start checking her blood pressures at home on a regular basis to see if they are lower at home.  If her blood pressures remain at a level of 140 systolic I will probably add hydrochlorothiazide to her generic Atacand.  CMP is normal except for mildly elevated blood sugar of 103 and minimally elevated ALT of 35.  We discussed a low sugar, low starch, low fat  diet.  Total cholesterol is 176, triglycerides 150, HDL cholesterol 83 and LDL cholesterol is 63.  The patient will continue her rosuvastatin 10 mg daily.    The patient's  was found to have coronary artery disease earlier this year after getting a calcium score exam and the patient is interested in doing this herself.  She is going to ask his cardiologist about this because her  has an appointment soon.  The patient is asymptomatic.    PLAN  Recheck in 6 months with fasting CMP and lipid panel.  The patient will bring in her blood pressure readings in 2 or 3 weeks for my review.  No Follow-up on file.

## 2019-06-14 RX ORDER — CANDESARTAN 16 MG/1
16 TABLET ORAL DAILY
Qty: 90 TABLET | Refills: 0 | Status: SHIPPED | OUTPATIENT
Start: 2019-06-14 | End: 2020-02-25 | Stop reason: SDUPTHER

## 2019-06-22 ENCOUNTER — HOSPITAL ENCOUNTER (EMERGENCY)
Facility: HOSPITAL | Age: 69
Discharge: HOME | End: 2019-06-22
Attending: EMERGENCY MEDICINE | Admitting: EMERGENCY MEDICINE

## 2019-06-22 ENCOUNTER — APPOINTMENT (OUTPATIENT)
Dept: CT IMAGING | Facility: HOSPITAL | Age: 69
End: 2019-06-22

## 2019-06-22 VITALS
BODY MASS INDEX: 24.49 KG/M2 | SYSTOLIC BLOOD PRESSURE: 161 MMHG | DIASTOLIC BLOOD PRESSURE: 89 MMHG | HEIGHT: 65 IN | TEMPERATURE: 97.2 F | HEART RATE: 80 BPM | RESPIRATION RATE: 18 BRPM | OXYGEN SATURATION: 98 % | WEIGHT: 147 LBS

## 2019-06-22 DIAGNOSIS — R11.0 NAUSEA: ICD-10-CM

## 2019-06-22 DIAGNOSIS — I10 ESSENTIAL HYPERTENSION: Primary | ICD-10-CM

## 2019-06-22 LAB
ALBUMIN SERPL-MCNC: 4.3 G/DL (ref 3.5–5.2)
ALBUMIN/GLOB SERPL: 1.4 G/DL
ALP SERPL-CCNC: 49 U/L (ref 39–117)
ALT SERPL W P-5'-P-CCNC: 38 U/L (ref 1–33)
ANION GAP SERPL CALCULATED.3IONS-SCNC: 14.9 MMOL/L
AST SERPL-CCNC: 31 U/L (ref 1–32)
BASOPHILS # BLD AUTO: 0.06 10*3/MM3 (ref 0–0.2)
BASOPHILS NFR BLD AUTO: 0.6 % (ref 0–1.5)
BILIRUB SERPL-MCNC: 0.3 MG/DL (ref 0.2–1.2)
BUN BLD-MCNC: 16 MG/DL (ref 8–23)
BUN/CREAT SERPL: 25.4 (ref 7–25)
CALCIUM SPEC-SCNC: 9.5 MG/DL (ref 8.6–10.5)
CHLORIDE SERPL-SCNC: 102 MMOL/L (ref 98–107)
CO2 SERPL-SCNC: 24.1 MMOL/L (ref 22–29)
CREAT BLD-MCNC: 0.63 MG/DL (ref 0.57–1)
DEPRECATED RDW RBC AUTO: 43.2 FL (ref 37–54)
EOSINOPHIL # BLD AUTO: 0.19 10*3/MM3 (ref 0–0.4)
EOSINOPHIL NFR BLD AUTO: 1.9 % (ref 0.3–6.2)
ERYTHROCYTE [DISTWIDTH] IN BLOOD BY AUTOMATED COUNT: 12.1 % (ref 12.3–15.4)
ERYTHROCYTE [SEDIMENTATION RATE] IN BLOOD: 12 MM/HR (ref 0–30)
GFR SERPL CREATININE-BSD FRML MDRD: 94 ML/MIN/1.73
GLOBULIN UR ELPH-MCNC: 3 GM/DL
GLUCOSE BLD-MCNC: 100 MG/DL (ref 65–99)
HCT VFR BLD AUTO: 38.9 % (ref 34–46.6)
HGB BLD-MCNC: 12.6 G/DL (ref 12–15.9)
IMM GRANULOCYTES # BLD AUTO: 0.04 10*3/MM3 (ref 0–0.05)
IMM GRANULOCYTES NFR BLD AUTO: 0.4 % (ref 0–0.5)
LYMPHOCYTES # BLD AUTO: 3.19 10*3/MM3 (ref 0.7–3.1)
LYMPHOCYTES NFR BLD AUTO: 31.6 % (ref 19.6–45.3)
MCH RBC QN AUTO: 31.3 PG (ref 26.6–33)
MCHC RBC AUTO-ENTMCNC: 32.4 G/DL (ref 31.5–35.7)
MCV RBC AUTO: 96.8 FL (ref 79–97)
MONOCYTES # BLD AUTO: 0.56 10*3/MM3 (ref 0.1–0.9)
MONOCYTES NFR BLD AUTO: 5.5 % (ref 5–12)
NEUTROPHILS # BLD AUTO: 6.06 10*3/MM3 (ref 1.7–7)
NEUTROPHILS NFR BLD AUTO: 60 % (ref 42.7–76)
NRBC BLD AUTO-RTO: 0 /100 WBC (ref 0–0.2)
PLATELET # BLD AUTO: 279 10*3/MM3 (ref 140–450)
PMV BLD AUTO: 9.4 FL (ref 6–12)
POTASSIUM BLD-SCNC: 4.5 MMOL/L (ref 3.5–5.2)
PROT SERPL-MCNC: 7.3 G/DL (ref 6–8.5)
RBC # BLD AUTO: 4.02 10*6/MM3 (ref 3.77–5.28)
SODIUM BLD-SCNC: 141 MMOL/L (ref 136–145)
TROPONIN T SERPL-MCNC: <0.01 NG/ML (ref 0–0.03)
WBC NRBC COR # BLD: 10.1 10*3/MM3 (ref 3.4–10.8)

## 2019-06-22 PROCEDURE — 84484 ASSAY OF TROPONIN QUANT: CPT | Performed by: EMERGENCY MEDICINE

## 2019-06-22 PROCEDURE — 93010 ELECTROCARDIOGRAM REPORT: CPT | Performed by: INTERNAL MEDICINE

## 2019-06-22 PROCEDURE — 85025 COMPLETE CBC W/AUTO DIFF WBC: CPT | Performed by: EMERGENCY MEDICINE

## 2019-06-22 PROCEDURE — 85652 RBC SED RATE AUTOMATED: CPT | Performed by: EMERGENCY MEDICINE

## 2019-06-22 PROCEDURE — 99284 EMERGENCY DEPT VISIT MOD MDM: CPT

## 2019-06-22 PROCEDURE — 93005 ELECTROCARDIOGRAM TRACING: CPT | Performed by: EMERGENCY MEDICINE

## 2019-06-22 PROCEDURE — 80053 COMPREHEN METABOLIC PANEL: CPT | Performed by: EMERGENCY MEDICINE

## 2019-06-22 PROCEDURE — 70450 CT HEAD/BRAIN W/O DYE: CPT

## 2019-06-22 RX ORDER — HYDROCHLOROTHIAZIDE 12.5 MG/1
12.5 TABLET ORAL DAILY
Qty: 30 TABLET | Refills: 0 | Status: SHIPPED | OUTPATIENT
Start: 2019-06-22 | End: 2019-07-22 | Stop reason: SDUPTHER

## 2019-07-01 ENCOUNTER — TELEPHONE (OUTPATIENT)
Dept: FAMILY MEDICINE CLINIC | Facility: CLINIC | Age: 69
End: 2019-07-01

## 2019-07-01 NOTE — TELEPHONE ENCOUNTER
PT IS AWARE OF WHAT DR. ESQUEDA SAID.     ----- Message from Graham Esqueda MD sent at 6/28/2019 10:23 AM EDT -----  Contact: PT  I think it should be fine to start on the HCTZ 12.5 mg each morning.  Continue on her other medications.  This should help lower her blood pressure just a little more and is usually well-tolerated.  She should make a follow-up appointment with someone in about a month    ----- Message -----  From: Gina Rowland MA  Sent: 6/28/2019   8:31 AM  To: MD Dr. HEENA Saleh,     This is a patient of Dr. Lee's. Please see below and advise. The paperwork that goes with this message is on your desk. Thank you.       ----- Message -----  From: Breana Davis RegSched Rep  Sent: 6/26/2019   9:16 AM  To: Gina Rowland MA    PT CALLED AND SAID SHE DROPPED OFF ON Monday 6-24-19 HER BP READINGS AND RX AND PAPER WORK FROM HER ER VISIT ON 6-22-19 AT [facility]. PT SAID ER MD TOLD HER TO UP HER HYDROCHLOROTHIAZIDE AND SHE WANTED TO SEE WHAT DR LEE THOUGHT. TOLD PT DR LEE WAS OUT OF OFFICE TODAY AND I WILL SEND A MESSAGE TO HIS MA, ASKING IF HE WILL PLEASE REVIEW WHAT PT DROPPED OFF AND CALL HER (PT) ON Thursday 6-27-19.     PT'S # [phone]

## 2019-07-15 DIAGNOSIS — K44.9 HIATAL HERNIA: ICD-10-CM

## 2019-07-15 DIAGNOSIS — K21.9 GASTROESOPHAGEAL REFLUX DISEASE WITHOUT ESOPHAGITIS: ICD-10-CM

## 2019-07-15 DIAGNOSIS — R12 HEARTBURN: ICD-10-CM

## 2019-07-15 RX ORDER — PANTOPRAZOLE SODIUM 40 MG/1
40 TABLET, DELAYED RELEASE ORAL DAILY
Qty: 90 TABLET | Refills: 0 | Status: SHIPPED | OUTPATIENT
Start: 2019-07-15 | End: 2019-10-09 | Stop reason: SDUPTHER

## 2019-07-22 ENCOUNTER — OFFICE VISIT (OUTPATIENT)
Dept: FAMILY MEDICINE CLINIC | Facility: CLINIC | Age: 69
End: 2019-07-22

## 2019-07-22 VITALS
WEIGHT: 147 LBS | HEIGHT: 65 IN | RESPIRATION RATE: 16 BRPM | BODY MASS INDEX: 24.49 KG/M2 | HEART RATE: 110 BPM | TEMPERATURE: 98.1 F | DIASTOLIC BLOOD PRESSURE: 60 MMHG | OXYGEN SATURATION: 96 % | SYSTOLIC BLOOD PRESSURE: 124 MMHG

## 2019-07-22 DIAGNOSIS — I10 BENIGN ESSENTIAL HTN: Primary | ICD-10-CM

## 2019-07-22 PROCEDURE — 99213 OFFICE O/P EST LOW 20 MIN: CPT | Performed by: NURSE PRACTITIONER

## 2019-07-22 RX ORDER — HYDROCHLOROTHIAZIDE 12.5 MG/1
12.5 TABLET ORAL DAILY
Qty: 90 TABLET | Refills: 3 | Status: SHIPPED | OUTPATIENT
Start: 2019-07-22 | End: 2019-07-24 | Stop reason: SDUPTHER

## 2019-07-22 NOTE — PROGRESS NOTES
Subjective   Teagan Cates is a 69 y.o. female. Patient is here today for   Chief Complaint   Patient presents with   • Hypertension     1 mo follow up           Vitals:    07/22/19 1025   BP: 124/60   Pulse: 110   Resp: 16   Temp: 98.1 °F (36.7 °C)   SpO2: 96%     The following portions of the patient's history were reviewed and updated as appropriate: allergies, current medications, past family history, past medical history, past social history, past surgical history and problem list.    Past Medical History:   Diagnosis Date   • Anxiety    • GERD (gastroesophageal reflux disease)    • Hyperlipidemia    • Hypertension       Allergies   Allergen Reactions   • Sulfa Antibiotics Anaphylaxis   • Iodinated Diagnostic Agents Rash      Social History     Socioeconomic History   • Marital status:      Spouse name: Not on file   • Number of children: Not on file   • Years of education: Not on file   • Highest education level: Not on file   Tobacco Use   • Smoking status: Never Smoker   Substance and Sexual Activity   • Alcohol use: Yes     Comment: SOCIAL   • Drug use: No   • Sexual activity: Defer        Current Outpatient Medications:   •  candesartan (ATACAND) 16 MG tablet, TAKE 1 TABLET BY MOUTH DAILY, Disp: 90 tablet, Rfl: 0  •  diclofenac (VOLTAREN) 1 % gel gel, Apply 4 g topically 2 (Two) Times a Day. Small amount to affected area, Disp: 100 g, Rfl: 0  •  hydrochlorothiazide (HYDRODIURIL) 12.5 MG tablet, Take 1 tablet by mouth Daily., Disp: 90 tablet, Rfl: 3  •  meloxicam (MOBIC) 15 MG tablet, 1 PO Daily with food., Disp: 30 tablet, Rfl: 3  •  pantoprazole (PROTONIX) 40 MG EC tablet, TAKE 1 TABLET BY MOUTH DAILY, Disp: 90 tablet, Rfl: 0  •  rosuvastatin (CRESTOR) 10 MG tablet, TAKE 1 TABLET BY MOUTH DAILY, Disp: 90 tablet, Rfl: 0  •  venlafaxine XR (EFFEXOR-XR) 150 MG 24 hr capsule, Take 1 capsule by mouth Daily., Disp: 90 capsule, Rfl: 3     Objective     Teagan is a patient of Dr Jerez who is here for a  one month follow up for HTN. She had elevated blood pressure and a headache so she went to the ER in June and was started on HCTZ. She has seen an improvement in her BP and she is not experiencing any side effects      Hypertension   This is a new problem. The current episode started more than 1 month ago. Pertinent negatives include no anxiety, blurred vision, chest pain, headaches, palpitations or peripheral edema. (Initially she had headaches, but since started treatment they have resolved ) There are no associated agents to hypertension. Risk factors for coronary artery disease include post-menopausal state. Current antihypertension treatment includes diuretics. The current treatment provides significant improvement. There are no compliance problems.         Review of Systems   Constitutional: Negative for fatigue.   Eyes: Negative for blurred vision and visual disturbance.   Respiratory: Negative.    Cardiovascular: Negative for chest pain, palpitations and leg swelling.   Neurological: Negative for dizziness and headaches.       Physical Exam   Constitutional: Vital signs are normal. She appears well-developed and well-nourished. No distress.   Cardiovascular: Normal rate and regular rhythm.   Murmur heard.   Systolic murmur is present with a grade of 2/6.  Pulmonary/Chest: Effort normal and breath sounds normal.   Neurological: She is alert.       ASSESSMENT     Problem List Items Addressed This Visit     Benign essential HTN - Primary    Relevant Medications    hydrochlorothiazide (HYDRODIURIL) 12.5 MG tablet          PLAN    Her blood pressure is well controlled on HCTZ. I asked her to monitor her Bp at home and call if >140/90  Follow up as scheduled in December or sooner if needed

## 2019-07-24 ENCOUNTER — TELEPHONE (OUTPATIENT)
Dept: FAMILY MEDICINE CLINIC | Facility: CLINIC | Age: 69
End: 2019-07-24

## 2019-07-24 RX ORDER — HYDROCHLOROTHIAZIDE 12.5 MG/1
12.5 TABLET ORAL DAILY
Qty: 90 TABLET | Refills: 3 | Status: SHIPPED | OUTPATIENT
Start: 2019-07-24 | End: 2019-07-24 | Stop reason: SDUPTHER

## 2019-07-24 RX ORDER — HYDROCHLOROTHIAZIDE 12.5 MG/1
12.5 TABLET ORAL DAILY
Qty: 90 TABLET | Refills: 3 | Status: SHIPPED | OUTPATIENT
Start: 2019-07-24 | End: 2020-07-06 | Stop reason: SDUPTHER

## 2019-07-24 NOTE — TELEPHONE ENCOUNTER
Rx sent to pharmacy      ----- Message from Marisa Dahl sent at 7/24/2019 10:23 AM EDT -----  NEEDS HER SCRIPT CALLED IN TO [facility]     hydrochlorothiazide (HYDRODIURIL) 12.5 MG tablet    QTY: 90    Sig: Take 1 tablet by mouth Daily.    THANK YOU

## 2019-08-19 RX ORDER — ROSUVASTATIN CALCIUM 10 MG/1
10 TABLET, COATED ORAL DAILY
Qty: 90 TABLET | Refills: 0 | Status: SHIPPED | OUTPATIENT
Start: 2019-08-19 | End: 2019-11-20 | Stop reason: SDUPTHER

## 2019-10-09 DIAGNOSIS — R12 HEARTBURN: ICD-10-CM

## 2019-10-09 DIAGNOSIS — K44.9 HIATAL HERNIA: ICD-10-CM

## 2019-10-09 DIAGNOSIS — K21.9 GASTROESOPHAGEAL REFLUX DISEASE WITHOUT ESOPHAGITIS: ICD-10-CM

## 2019-10-09 RX ORDER — PANTOPRAZOLE SODIUM 40 MG/1
40 TABLET, DELAYED RELEASE ORAL DAILY
Qty: 90 TABLET | Refills: 0 | Status: SHIPPED | OUTPATIENT
Start: 2019-10-09 | End: 2020-04-02 | Stop reason: SDUPTHER

## 2019-10-17 ENCOUNTER — TELEPHONE (OUTPATIENT)
Dept: FAMILY MEDICINE CLINIC | Facility: CLINIC | Age: 69
End: 2019-10-17

## 2019-10-17 NOTE — TELEPHONE ENCOUNTER
NEEDS RX FOR VENLAFAXINE  MG 1 PO QD #90 SENT TO [facility].     PT IS AWARE [facility] IS OUT OF THE OFFICE TODAY. AND WILL NOT BE ADDRESSED UNTIL TOMORROW.     PT # [phone]

## 2019-10-18 RX ORDER — VENLAFAXINE HYDROCHLORIDE 150 MG/1
150 CAPSULE, EXTENDED RELEASE ORAL DAILY
Qty: 90 CAPSULE | Refills: 0 | Status: SHIPPED | OUTPATIENT
Start: 2019-10-18 | End: 2020-01-15 | Stop reason: SDUPTHER

## 2019-11-20 RX ORDER — ROSUVASTATIN CALCIUM 10 MG/1
10 TABLET, COATED ORAL DAILY
Qty: 90 TABLET | Refills: 0 | Status: SHIPPED | OUTPATIENT
Start: 2019-11-20 | End: 2020-02-25 | Stop reason: SDUPTHER

## 2019-12-09 DIAGNOSIS — E78.2 MIXED HYPERLIPIDEMIA: ICD-10-CM

## 2019-12-09 DIAGNOSIS — I10 BENIGN ESSENTIAL HTN: Primary | ICD-10-CM

## 2019-12-10 LAB
ALBUMIN SERPL-MCNC: 4.7 G/DL (ref 3.5–5.2)
ALBUMIN/GLOB SERPL: 1.6 G/DL
ALP SERPL-CCNC: 47 U/L (ref 39–117)
ALT SERPL-CCNC: 35 U/L (ref 1–33)
AST SERPL-CCNC: 31 U/L (ref 1–32)
BILIRUB SERPL-MCNC: 0.6 MG/DL (ref 0.2–1.2)
BUN SERPL-MCNC: 16 MG/DL (ref 8–23)
BUN/CREAT SERPL: 23.9 (ref 7–25)
CALCIUM SERPL-MCNC: 9.6 MG/DL (ref 8.6–10.5)
CHLORIDE SERPL-SCNC: 100 MMOL/L (ref 98–107)
CHOLEST SERPL-MCNC: 199 MG/DL (ref 0–200)
CO2 SERPL-SCNC: 27.5 MMOL/L (ref 22–29)
CREAT SERPL-MCNC: 0.67 MG/DL (ref 0.57–1)
GLOBULIN SER CALC-MCNC: 2.9 GM/DL
GLUCOSE SERPL-MCNC: 109 MG/DL (ref 65–99)
HDLC SERPL-MCNC: 77 MG/DL (ref 40–60)
LDLC SERPL CALC-MCNC: 87 MG/DL (ref 0–100)
LDLC/HDLC SERPL: 1.13 {RATIO}
POTASSIUM SERPL-SCNC: 4 MMOL/L (ref 3.5–5.2)
PROT SERPL-MCNC: 7.6 G/DL (ref 6–8.5)
SODIUM SERPL-SCNC: 142 MMOL/L (ref 136–145)
TRIGL SERPL-MCNC: 174 MG/DL (ref 0–150)
VLDLC SERPL CALC-MCNC: 34.8 MG/DL

## 2019-12-16 ENCOUNTER — OFFICE VISIT (OUTPATIENT)
Dept: FAMILY MEDICINE CLINIC | Facility: CLINIC | Age: 69
End: 2019-12-16

## 2019-12-16 VITALS
BODY MASS INDEX: 25.09 KG/M2 | SYSTOLIC BLOOD PRESSURE: 118 MMHG | DIASTOLIC BLOOD PRESSURE: 64 MMHG | HEART RATE: 67 BPM | OXYGEN SATURATION: 98 % | HEIGHT: 65 IN | WEIGHT: 150.6 LBS

## 2019-12-16 DIAGNOSIS — E78.2 MIXED HYPERLIPIDEMIA: ICD-10-CM

## 2019-12-16 DIAGNOSIS — I10 BENIGN ESSENTIAL HTN: Primary | ICD-10-CM

## 2019-12-16 DIAGNOSIS — R73.01 ELEVATED FASTING BLOOD SUGAR: ICD-10-CM

## 2019-12-16 PROCEDURE — 99213 OFFICE O/P EST LOW 20 MIN: CPT | Performed by: NURSE PRACTITIONER

## 2019-12-16 RX ORDER — ROSUVASTATIN CALCIUM 5 MG/1
5 TABLET, COATED ORAL DAILY
Qty: 30 TABLET | Refills: 2 | Status: SHIPPED | OUTPATIENT
Start: 2019-12-16 | End: 2020-03-20

## 2019-12-16 NOTE — PROGRESS NOTES
"Subjective     Teagan Cates is a 69 y.o.. female.     HPI: Pt here today for lab results.     The following portions of the patient's history were reviewed and updated as appropriate: allergies, current medications, past family history, past medical history, past social history, past surgical history and problem list.    Past Medical History:   Diagnosis Date   • Anxiety    • GERD (gastroesophageal reflux disease)    • Hyperlipidemia    • Hypertension        Past Surgical History:   Procedure Laterality Date   • ABDOMINOPLASTY     • CATARACT EXTRACTION W/ INTRAOCULAR LENS IMPLANT Bilateral    • CHOLECYSTECTOMY     • COLONOSCOPY  02/2016    normal per pt    • ENDOSCOPY N/A 5/2/2017    large HH, gastritis, multiple gastric polyps, nodule in duodenum   • HYSTERECTOMY     • MOHS SURGERY  2017   • TUBAL ABDOMINAL LIGATION         Review of Systems   Constitutional: Negative.    HENT: Negative.    Respiratory: Negative.    Cardiovascular: Negative.    Gastrointestinal: Negative.    Neurological: Negative.        Allergies   Allergen Reactions   • Sulfa Antibiotics Anaphylaxis   • Iodinated Diagnostic Agents Rash       Objective     Vitals:    12/16/19 1024   BP: 118/64   Pulse: 67   SpO2: 98%   Weight: 68.3 kg (150 lb 9.6 oz)   Height: 165.1 cm (65\")     Body mass index is 25.06 kg/m².    Physical Exam   Constitutional: She is oriented to person, place, and time. She appears well-developed and well-nourished.   HENT:   Head: Normocephalic and atraumatic.   Eyes: Pupils are equal, round, and reactive to light.   Cardiovascular: Normal rate and regular rhythm.   Murmur heard.  Pulmonary/Chest: Effort normal and breath sounds normal.   Musculoskeletal: Normal range of motion.   Neurological: She is alert and oriented to person, place, and time.   Vitals reviewed.        Current Outpatient Medications:   •  Apoaequorin (PREVAGEN PO), Take  by mouth., Disp: , Rfl:   •  candesartan (ATACAND) 16 MG tablet, TAKE 1 TABLET BY " MOUTH DAILY, Disp: 90 tablet, Rfl: 0  •  diclofenac (VOLTAREN) 1 % gel gel, Apply 4 g topically 2 (Two) Times a Day. Small amount to affected area, Disp: 100 g, Rfl: 0  •  hydrochlorothiazide (HYDRODIURIL) 12.5 MG tablet, Take 1 tablet by mouth Daily., Disp: 90 tablet, Rfl: 3  •  meloxicam (MOBIC) 15 MG tablet, 1 PO Daily with food., Disp: 30 tablet, Rfl: 3  •  pantoprazole (PROTONIX) 40 MG EC tablet, Take 1 tablet by mouth Daily., Disp: 90 tablet, Rfl: 0  •  rosuvastatin (CRESTOR) 10 MG tablet, TAKE 1 TABLET BY MOUTH DAILY, Disp: 90 tablet, Rfl: 0  •  venlafaxine XR (EFFEXOR-XR) 150 MG 24 hr capsule, Take 1 capsule by mouth Daily., Disp: 90 capsule, Rfl: 0  •  rosuvastatin (CRESTOR) 5 MG tablet, Take 1 tablet by mouth Daily., Disp: 30 tablet, Rfl: 2    Labs 12/10/19    Total Cholesterol  0 - 200 mg/dL 199    Triglycerides  0 - 150 mg/dL 174High     HDL Cholesterol  40 - 60 mg/dL 77High     VLDL Cholesterol  mg/dL 34.8    LDL Cholesterol   0 - 100 mg/dL 87    LDL/HDL Ratio 1.13      Glucose  65 - 99 mg/dL 109High     BUN  8 - 23 mg/dL 16    Creatinine  0.57 - 1.00 mg/dL 0.67    eGFR Non African Am  >60 mL/min/1.73 88    eGFR African Am  >60 mL/min/1.73 106    BUN/Creatinine Ratio  7.0 - 25.0 23.9    Sodium  136 - 145 mmol/L 142    Potassium  3.5 - 5.2 mmol/L 4.0    Chloride  98 - 107 mmol/L 100    Total CO2  22.0 - 29.0 mmol/L 27.5    Calcium  8.6 - 10.5 mg/dL 9.6    Total Protein  6.0 - 8.5 g/dL 7.6    Albumin  3.50 - 5.20 g/dL 4.70    Globulin  gm/dL 2.9    A/G Ratio  g/dL 1.6    Total Bilirubin  0.2 - 1.2 mg/dL 0.6    Alkaline Phosphatase  39 - 117 U/L 47    AST (SGOT)  1 - 32 U/L 31    ALT (SGPT)  1 - 33 U/L 35High         Assessment/Plan   Teagan was seen today for hypertension.    Diagnoses and all orders for this visit:    Benign essential HTN    Mixed hyperlipidemia  -     rosuvastatin (CRESTOR) 5 MG tablet; Take 1 tablet by mouth Daily.    Elevated fasting blood sugar        Patient Instructions    Discussed eating a low cholesterol/low triglyceride diet.  Discussed follow up with fasting labs (HgA1c and Lipid panel) in 3 months and then following up with appt.       Return for rto in 3 months for fasting lipids and HgA1c and then appt. afterwards.

## 2019-12-16 NOTE — PATIENT INSTRUCTIONS
Discussed eating a low cholesterol/low triglyceride diet.  Discussed follow up with fasting labs (HgA1c and Lipid panel) in 3 months and then following up with appt.

## 2020-01-15 ENCOUNTER — TELEPHONE (OUTPATIENT)
Dept: FAMILY MEDICINE CLINIC | Facility: CLINIC | Age: 70
End: 2020-01-15

## 2020-01-15 RX ORDER — VENLAFAXINE HYDROCHLORIDE 150 MG/1
150 CAPSULE, EXTENDED RELEASE ORAL DAILY
Qty: 90 CAPSULE | Refills: 1 | Status: SHIPPED | OUTPATIENT
Start: 2020-01-15 | End: 2020-07-08 | Stop reason: SDUPTHER

## 2020-01-15 NOTE — TELEPHONE ENCOUNTER
Pt is requesting a refill/new RX on her Venlafaxine HCL  mg Last filled by [facility] on 10/18/19 # 90.    [facility] [phone]

## 2020-01-27 ENCOUNTER — OFFICE VISIT (OUTPATIENT)
Dept: FAMILY MEDICINE CLINIC | Facility: CLINIC | Age: 70
End: 2020-01-27

## 2020-01-27 VITALS
TEMPERATURE: 98.6 F | DIASTOLIC BLOOD PRESSURE: 68 MMHG | OXYGEN SATURATION: 98 % | BODY MASS INDEX: 25.19 KG/M2 | WEIGHT: 151.2 LBS | SYSTOLIC BLOOD PRESSURE: 104 MMHG | HEART RATE: 67 BPM | RESPIRATION RATE: 16 BRPM | HEIGHT: 65 IN

## 2020-01-27 DIAGNOSIS — S42.002A CLOSED DISPLACED FRACTURE OF LEFT CLAVICLE, UNSPECIFIED PART OF CLAVICLE, INITIAL ENCOUNTER: ICD-10-CM

## 2020-01-27 DIAGNOSIS — Z09 HOSPITAL DISCHARGE FOLLOW-UP: Primary | ICD-10-CM

## 2020-01-27 DIAGNOSIS — S42.295A OTHER CLOSED NONDISPLACED FRACTURE OF PROXIMAL END OF LEFT HUMERUS, INITIAL ENCOUNTER: ICD-10-CM

## 2020-01-27 PROCEDURE — 99214 OFFICE O/P EST MOD 30 MIN: CPT | Performed by: NURSE PRACTITIONER

## 2020-01-27 RX ORDER — ONDANSETRON 8 MG/1
TABLET, ORALLY DISINTEGRATING ORAL
COMMUNITY
Start: 2020-01-22 | End: 2020-05-28

## 2020-01-27 RX ORDER — MELOXICAM 15 MG/1
15 TABLET ORAL DAILY
Qty: 14 TABLET | Refills: 1 | Status: SHIPPED | OUTPATIENT
Start: 2020-01-27 | End: 2020-05-28

## 2020-01-27 RX ORDER — HYDROCODONE BITARTRATE AND ACETAMINOPHEN 5; 325 MG/1; MG/1
TABLET ORAL
COMMUNITY
Start: 2020-01-22 | End: 2020-02-05

## 2020-01-27 NOTE — PROGRESS NOTES
Subjective   Teagan Cates is a 69 y.o. female. Patient is here today for follow up from [facility]  Chief Complaint   Patient presents with   • Shoulder Pain     Hospital f/u broke Left arm       (Not on file)-  Risk for Readmission (LACE) No data recorded         Vitals:    01/27/20 1416   BP: 104/68   Pulse: 67   Resp: 16   Temp: 98.6 °F (37 °C)   SpO2: 98%     The following portions of the patient's history were reviewed and updated as appropriate: allergies, current medications, past family history, past medical history, past social history, past surgical history and problem list.    Past Medical History:   Diagnosis Date   • Anxiety    • GERD (gastroesophageal reflux disease)    • Hyperlipidemia    • Hypertension       Allergies   Allergen Reactions   • Sulfa Antibiotics Anaphylaxis   • Iodinated Diagnostic Agents Rash      Social History     Socioeconomic History   • Marital status:      Spouse name: Not on file   • Number of children: Not on file   • Years of education: Not on file   • Highest education level: Not on file   Tobacco Use   • Smoking status: Never Smoker   Substance and Sexual Activity   • Alcohol use: Yes     Comment: SOCIAL   • Drug use: No   • Sexual activity: Defer        Current Outpatient Medications:   •  Apoaequorin (PREVAGEN PO), Take  by mouth., Disp: , Rfl:   •  candesartan (ATACAND) 16 MG tablet, TAKE 1 TABLET BY MOUTH DAILY, Disp: 90 tablet, Rfl: 0  •  diclofenac (VOLTAREN) 1 % gel gel, Apply 4 g topically 2 (Two) Times a Day. Small amount to affected area, Disp: 100 g, Rfl: 0  •  pantoprazole (PROTONIX) 40 MG EC tablet, Take 1 tablet by mouth Daily., Disp: 90 tablet, Rfl: 0  •  rosuvastatin (CRESTOR) 10 MG tablet, TAKE 1 TABLET BY MOUTH DAILY, Disp: 90 tablet, Rfl: 0  •  rosuvastatin (CRESTOR) 5 MG tablet, Take 1 tablet by mouth Daily., Disp: 30 tablet, Rfl: 2  •  venlafaxine XR (EFFEXOR-XR) 150 MG 24 hr capsule, Take 1 capsule by mouth Daily., Disp: 90 capsule, Rfl:  1  •  hydrochlorothiazide (HYDRODIURIL) 12.5 MG tablet, Take 1 tablet by mouth Daily., Disp: 90 tablet, Rfl: 3  •  HYDROcodone-acetaminophen (NORCO) 5-325 MG per tablet, , Disp: , Rfl:   •  meloxicam (MOBIC) 15 MG tablet, Take 1 tablet by mouth Daily., Disp: 14 tablet, Rfl: 1  •  ondansetron ODT (ZOFRAN-ODT) 8 MG disintegrating tablet, , Disp: , Rfl:      Objective     Pt here today for f/u on Hospital admit to [facility] for fall down stairs. Pt found face down at bottom of stairs by  on 1/22/2020, called EMS and pt taken to ER. Pt likely fell down about 7-8 steps per pt and pt's  report. Pt stating she doesn't remember falling. Pt stating she does not remember talking to  after fall and before ER. Pt having c/o headache and nausea at ER. Pt having CT of head done that showed no acute intracranial findings; CT cervical spine showed no acute  Fractures or malalignment; shoulder xray showed comminuted proximal left humerus fracture, minimally displaced distal left clavicle fracture; elbow xray showed age indeterminate irregular of radial head; chest xray showed no acute pulmonary findings; EKG showed left Bundle Branch Block (has history of this, not new); 2D Echo showed EF 70%, no ASD, no VSD, no thrombus, no vascular heart disease. Pt having elevated LFT's (AST 60 and ALT 65) and Hep C panel negative. Pt d/c'd home with sling to wear 24 hours a day until re-seen by ortho, [facility] ortho. Dr. Tsang, on Feb. 6. Pt taken off of HCTZ in ER d/t hypokalemia, down to 3.0 at admit, given potassium and back up to 3.4 at d/c.     Pt admitting she has been normally been drinking 3 glasses of wine a night for past year. Pt's daughter passed 18 years ago on [employer] day and her birthday falls in the middle of December. So this time of the year is very hard for patient.        Review of Systems   Constitutional: Negative.    Musculoskeletal:        Pain to right clavicle and right arm.    Neurological:  Negative for dizziness, tremors, speech difficulty, weakness and headaches.   Psychiatric/Behavioral: Positive for dysphoric mood (depression/anxiety). Negative for agitation and confusion.       Physical Exam   Constitutional: She is oriented to person, place, and time. She appears well-developed and well-nourished.   HENT:   Head: Normocephalic.   Eyes: Pupils are equal, round, and reactive to light.   Cardiovascular: Normal rate and regular rhythm.   Pulmonary/Chest: Effort normal and breath sounds normal.   Musculoskeletal:   Left arm  In sling; cap refill wnl to left hand, good  with left hand, minor swelling noted to left hand   Neurological: She is alert and oriented to person, place, and time.   Vitals reviewed.      ASSESSMENT     Problem List Items Addressed This Visit     None      Visit Diagnoses     Hospital discharge follow-up    -  Primary    Closed displaced fracture of left clavicle, unspecified part of clavicle, initial encounter        Relevant Medications    meloxicam (MOBIC) 15 MG tablet    Other closed nondisplaced fracture of proximal end of left humerus, initial encounter              Current outpatient and discharge medications have been reconciled for the patient.  Reviewed by: MARISOL Holley      PLAN    Patient Instructions   Pt to check b/p daily and keep log, if b/p starts increasing pt is to call office. (pt was taken off her HCTZ while in ER), b/p wnl today, will keep off at this time.  Will do labs in March (including re-eval of LFT's and K+)  Keep appt. With ortho on 2/6 and have office notes sent over to keep updated on pt care  Discussed need to reduce alcohol intake, pt stating for the past two nights she has had only one glass of wine a night. Pt stating she is going to try to keep this up.   Discussed possibility of adding to pt's Effexor to help with her anxiety and depression. Will discuss further on next office visit.     Return for Keep appt. in March for  labs/appt .

## 2020-01-27 NOTE — PATIENT INSTRUCTIONS
Pt to check b/p daily and keep log, if b/p starts increasing pt is to call office. (pt was taken off her HCTZ while in ER), b/p wnl today, will keep off at this time.  Will do labs in March (including re-eval of LFT's and K+)  Keep appt. With ortho on 2/6 and have office notes sent over to keep updated on pt care  Discussed need to reduce alcohol intake, pt stating for the past two nights she has had only one glass of wine a night. Pt stating she is going to try to keep this up.   Discussed possibility of adding to pt's Effexor to help with her anxiety and depression. Will discuss further on next office visit.

## 2020-02-24 ENCOUNTER — TELEPHONE (OUTPATIENT)
Dept: FAMILY MEDICINE CLINIC | Facility: CLINIC | Age: 70
End: 2020-02-24

## 2020-02-25 RX ORDER — ROSUVASTATIN CALCIUM 10 MG/1
10 TABLET, COATED ORAL DAILY
Qty: 90 TABLET | Refills: 0 | OUTPATIENT
Start: 2020-02-25

## 2020-02-25 RX ORDER — ROSUVASTATIN CALCIUM 10 MG/1
10 TABLET, COATED ORAL DAILY
Qty: 90 TABLET | Refills: 0 | Status: SHIPPED | OUTPATIENT
Start: 2020-02-25 | End: 2020-03-20

## 2020-02-25 RX ORDER — CANDESARTAN 16 MG/1
16 TABLET ORAL DAILY
Qty: 90 TABLET | Refills: 0 | Status: SHIPPED | OUTPATIENT
Start: 2020-02-25 | End: 2020-05-18

## 2020-03-17 DIAGNOSIS — I10 BENIGN ESSENTIAL HTN: Primary | ICD-10-CM

## 2020-03-17 DIAGNOSIS — E78.2 MIXED HYPERLIPIDEMIA: ICD-10-CM

## 2020-03-17 LAB
ALBUMIN SERPL-MCNC: 4.4 G/DL (ref 3.5–5.2)
ALBUMIN/GLOB SERPL: 1.7 G/DL
ALP SERPL-CCNC: 56 U/L (ref 39–117)
ALT SERPL-CCNC: 26 U/L (ref 1–33)
AST SERPL-CCNC: 21 U/L (ref 1–32)
BILIRUB SERPL-MCNC: 0.5 MG/DL (ref 0.2–1.2)
BUN SERPL-MCNC: 12 MG/DL (ref 8–23)
BUN/CREAT SERPL: 19.4 (ref 7–25)
CALCIUM SERPL-MCNC: 9.7 MG/DL (ref 8.6–10.5)
CHLORIDE SERPL-SCNC: 103 MMOL/L (ref 98–107)
CHOLEST SERPL-MCNC: 188 MG/DL (ref 0–200)
CO2 SERPL-SCNC: 26.2 MMOL/L (ref 22–29)
CREAT SERPL-MCNC: 0.62 MG/DL (ref 0.57–1)
GLOBULIN SER CALC-MCNC: 2.6 GM/DL
GLUCOSE SERPL-MCNC: 113 MG/DL (ref 65–99)
HDLC SERPL-MCNC: 75 MG/DL (ref 40–60)
LDLC SERPL CALC-MCNC: 78 MG/DL (ref 0–100)
POTASSIUM SERPL-SCNC: 4.3 MMOL/L (ref 3.5–5.2)
PROT SERPL-MCNC: 7 G/DL (ref 6–8.5)
SODIUM SERPL-SCNC: 141 MMOL/L (ref 136–145)
TRIGL SERPL-MCNC: 175 MG/DL (ref 0–150)
VLDLC SERPL CALC-MCNC: 35 MG/DL

## 2020-03-20 DIAGNOSIS — E78.2 MIXED HYPERLIPIDEMIA: Primary | ICD-10-CM

## 2020-03-20 RX ORDER — ROSUVASTATIN CALCIUM 20 MG/1
20 TABLET, COATED ORAL DAILY
Qty: 90 TABLET | Refills: 0 | Status: SHIPPED | OUTPATIENT
Start: 2020-03-20 | End: 2020-06-15

## 2020-04-01 ENCOUNTER — TELEPHONE (OUTPATIENT)
Dept: FAMILY MEDICINE CLINIC | Facility: CLINIC | Age: 70
End: 2020-04-01

## 2020-04-01 DIAGNOSIS — K21.9 GASTROESOPHAGEAL REFLUX DISEASE WITHOUT ESOPHAGITIS: ICD-10-CM

## 2020-04-01 DIAGNOSIS — R12 HEARTBURN: ICD-10-CM

## 2020-04-01 DIAGNOSIS — K44.9 HIATAL HERNIA: ICD-10-CM

## 2020-04-01 NOTE — TELEPHONE ENCOUNTER
WILFREDO,   PATIENT IS REQUESTING A REFILL ON HER PANTOPRAZOLE 40MG BE SENT TO [facility] [address]? IT LOOKS LIKE YOU HAVEN'T FILLED THIS FOR HER BEFORE, BUT KRISTIAN DID BEFORE SHE LEFT THE OFFICE. THANK YOU.

## 2020-04-02 RX ORDER — PANTOPRAZOLE SODIUM 40 MG/1
40 TABLET, DELAYED RELEASE ORAL DAILY
Qty: 90 TABLET | Refills: 0 | Status: SHIPPED | OUTPATIENT
Start: 2020-04-02 | End: 2020-06-22

## 2020-05-18 RX ORDER — CANDESARTAN 16 MG/1
TABLET ORAL
Qty: 90 TABLET | Refills: 0 | Status: SHIPPED | OUTPATIENT
Start: 2020-05-18 | End: 2020-08-14

## 2020-05-28 ENCOUNTER — OFFICE VISIT (OUTPATIENT)
Dept: FAMILY MEDICINE CLINIC | Facility: CLINIC | Age: 70
End: 2020-05-28

## 2020-05-28 VITALS
WEIGHT: 149.8 LBS | SYSTOLIC BLOOD PRESSURE: 124 MMHG | HEIGHT: 65 IN | BODY MASS INDEX: 24.96 KG/M2 | HEART RATE: 72 BPM | TEMPERATURE: 97.1 F | OXYGEN SATURATION: 98 % | DIASTOLIC BLOOD PRESSURE: 76 MMHG | RESPIRATION RATE: 16 BRPM

## 2020-05-28 DIAGNOSIS — F41.1 ANXIETY, GENERALIZED: ICD-10-CM

## 2020-05-28 DIAGNOSIS — I10 BENIGN ESSENTIAL HTN: Primary | ICD-10-CM

## 2020-05-28 DIAGNOSIS — E78.2 MIXED HYPERLIPIDEMIA: ICD-10-CM

## 2020-05-28 DIAGNOSIS — R01.1 HEART MURMUR, SYSTOLIC: ICD-10-CM

## 2020-05-28 PROCEDURE — 99214 OFFICE O/P EST MOD 30 MIN: CPT | Performed by: NURSE PRACTITIONER

## 2020-05-28 NOTE — PROGRESS NOTES
Subjective     Teagan Cates is a 70 y.o.. female.     Pt here today for f/u on HTN. Pt stating she checks b/p every 2-3 days with results 128/70's. Pt stating she is eating a healthy diet and staying away from high triglyceride foods. Pt has hx of HTN, HLD, heart murmur, GERD, Anxiety and Hiatal hernia. Pt denies any issues/complaints today. Pt stating she is still going to Physical therapy for left shoulder and doing well. Pt stating she has a new grand-daughter (now has 3 grand-daughters). Pt denies any stressors associated with changes in life with Covid19.       The following portions of the patient's history were reviewed and updated as appropriate: allergies, current medications, past family history, past medical history, past social history, past surgical history and problem list.    Past Medical History:   Diagnosis Date   • Anxiety    • GERD (gastroesophageal reflux disease)    • Hyperlipidemia    • Hypertension        Past Surgical History:   Procedure Laterality Date   • ABDOMINOPLASTY     • CATARACT EXTRACTION W/ INTRAOCULAR LENS IMPLANT Bilateral    • CHOLECYSTECTOMY     • COLONOSCOPY  02/2016    normal per pt    • ENDOSCOPY N/A 5/2/2017    large HH, gastritis, multiple gastric polyps, nodule in duodenum   • HYSTERECTOMY     • MOHS SURGERY  2017   • TUBAL ABDOMINAL LIGATION         Review of Systems   Constitutional: Negative for fever.   Respiratory: Positive for cough (cough only at nights, history of this).    Cardiovascular: Negative.  Negative for leg swelling.   Genitourinary: Negative.    Psychiatric/Behavioral: Negative for dysphoric mood, self-injury, sleep disturbance and suicidal ideas. The patient is not nervous/anxious.        Allergies   Allergen Reactions   • Sulfa Antibiotics Anaphylaxis   • Iodinated Diagnostic Agents Rash       Objective     Vitals:    05/28/20 0855   BP: 124/76   Pulse: 72   Resp: 16   Temp: 97.1 °F (36.2 °C)   SpO2: 98%   Weight: 67.9 kg (149 lb 12.8 oz)   Height:  "165 cm (64.96\")     Body mass index is 24.96 kg/m².    Physical Exam   Constitutional: She is oriented to person, place, and time. She appears well-developed and well-nourished.   HENT:   Head: Normocephalic and atraumatic.   Right Ear: Tympanic membrane normal. Tympanic membrane is not erythematous.   Left Ear: Tympanic membrane normal. Tympanic membrane is not erythematous.   Nose: Nose normal.   Mouth/Throat: Oropharynx is clear and moist and mucous membranes are normal.   Eyes: Pupils are equal, round, and reactive to light. Conjunctivae and EOM are normal.   Neck: Normal range of motion. Neck supple. Carotid bruit is not present. No tracheal deviation present.   Cardiovascular: Normal rate, regular rhythm and normal pulses.   Murmur heard.  Pulmonary/Chest: Breath sounds normal. No accessory muscle usage or stridor. No respiratory distress. She has no decreased breath sounds. She has no wheezes. She has no rhonchi. She has no rales.   Abdominal: Soft. Normal appearance and bowel sounds are normal. She exhibits no distension and no mass. There is no hepatosplenomegaly. There is no tenderness. There is no rigidity, no rebound and no guarding. No hernia.   Musculoskeletal: Normal range of motion.   Lymphadenopathy:     She has no cervical adenopathy.   Neurological: She is alert and oriented to person, place, and time. She exhibits normal muscle tone. Coordination normal.   Psychiatric: She has a normal mood and affect. Her speech is normal and behavior is normal.       Current Outpatient Medications:   •  Apoaequorin (PREVAGEN PO), Take  by mouth., Disp: , Rfl:   •  candesartan (ATACAND) 16 MG tablet, TAKE 1 TABLET BY MOUTH EVERY DAY, Disp: 90 tablet, Rfl: 0  •  hydrochlorothiazide (HYDRODIURIL) 12.5 MG tablet, Take 1 tablet by mouth Daily., Disp: 90 tablet, Rfl: 3  •  pantoprazole (PROTONIX) 40 MG EC tablet, Take 1 tablet by mouth Daily., Disp: 90 tablet, Rfl: 0  •  rosuvastatin (Crestor) 20 MG tablet, Take 1 " tablet by mouth Daily., Disp: 90 tablet, Rfl: 0  •  venlafaxine XR (EFFEXOR-XR) 150 MG 24 hr capsule, Take 1 capsule by mouth Daily., Disp: 90 capsule, Rfl: 1     Labs done on 3/17/20:  Total Cholesterol  0 - 200 mg/dL 188    Triglycerides  0 - 150 mg/dL 175High     HDL Cholesterol  40 - 60 mg/dL 75High     VLDL Cholesterol  mg/dL 35    LDL Cholesterol   0 - 100 mg/dL 78      Glucose  65 - 99 mg/dL 113High     BUN  8 - 23 mg/dL 12    Creatinine  0.57 - 1.00 mg/dL 0.62    eGFR Non African Am  >60 mL/min/1.73 95    eGFR African Am  >60 mL/min/1.73 116    BUN/Creatinine Ratio  7.0 - 25.0 19.4    Sodium  136 - 145 mmol/L 141    Potassium  3.5 - 5.2 mmol/L 4.3    Chloride  98 - 107 mmol/L 103    Total CO2  22.0 - 29.0 mmol/L 26.2    Calcium  8.6 - 10.5 mg/dL 9.7    Total Protein  6.0 - 8.5 g/dL 7.0    Albumin  3.50 - 5.20 g/dL 4.40    Globulin  gm/dL 2.6    A/G Ratio  g/dL 1.7    Total Bilirubin  0.2 - 1.2 mg/dL 0.5    Alkaline Phosphatase  39 - 117 U/L 56    AST (SGOT)  1 - 32 U/L 21    ALT (SGPT)  1 - 33 U/L 26      HgA1c ordered but not done.     Assessment/Plan   Teagan was seen today for hypertension.    Diagnoses and all orders for this visit:    Benign essential HTN    Heart murmur, systolic    Mixed hyperlipidemia    Anxiety, generalized        Patient Instructions   HTN: b/p stable, continue medications-Atacand 16 mg daily, HCTZ 12.5 mg daily; continue checking b/p every 2-3 days; Discussed 2Decho, last one done 10 yrs ago, pt has heart murmur and left BBB; continue to drink plenty of fluids--water.    HLD: continue to monitor diet, stay on heart healthy low triglyceride diet, will re-check lipids in 6 months    Anxiety: no complaints or issues today, pt stating medication is doing well. Continue Effexor 150 mg daily    Continue PT for left shoulder   Blood sugar slightly elevated on March labs and HgA1c not done; will check with next lab draw.      Return for follow up in 6 months for fasting  labs(cbc,cmp,lipids,hga1c, u/aTSH,T4fre);AWV/lab f/u oneweek later.

## 2020-05-28 NOTE — PATIENT INSTRUCTIONS
HTN: b/p stable, continue medications-Atacand 16 mg daily, HCTZ 12.5 mg daily; continue checking b/p every 2-3 days; Discussed 2Decho, last one done 10 yrs ago, pt has heart murmur and left BBB; continue to drink plenty of fluids--water.    HLD: continue to monitor diet, stay on heart healthy low triglyceride diet, will re-check lipids in 6 months    Anxiety: no complaints or issues today, pt stating medication is doing well. Continue Effexor 150 mg daily    Continue PT for left shoulder   Blood sugar slightly elevated on March labs and HgA1c not done; will check with next lab draw.

## 2020-06-13 DIAGNOSIS — E78.2 MIXED HYPERLIPIDEMIA: ICD-10-CM

## 2020-06-15 RX ORDER — ROSUVASTATIN CALCIUM 20 MG/1
TABLET, COATED ORAL
Qty: 90 TABLET | Refills: 0 | Status: SHIPPED | OUTPATIENT
Start: 2020-06-15 | End: 2020-07-08 | Stop reason: SDUPTHER

## 2020-06-22 DIAGNOSIS — R12 HEARTBURN: ICD-10-CM

## 2020-06-22 DIAGNOSIS — K44.9 HIATAL HERNIA: ICD-10-CM

## 2020-06-22 DIAGNOSIS — K21.9 GASTROESOPHAGEAL REFLUX DISEASE WITHOUT ESOPHAGITIS: ICD-10-CM

## 2020-06-22 RX ORDER — PANTOPRAZOLE SODIUM 40 MG/1
TABLET, DELAYED RELEASE ORAL
Qty: 90 TABLET | Refills: 1 | Status: SHIPPED | OUTPATIENT
Start: 2020-06-22 | End: 2021-01-04

## 2020-07-06 RX ORDER — HYDROCHLOROTHIAZIDE 12.5 MG/1
12.5 TABLET ORAL DAILY
Qty: 90 TABLET | Refills: 3 | Status: SHIPPED | OUTPATIENT
Start: 2020-07-06 | End: 2021-08-31

## 2020-07-08 DIAGNOSIS — E78.2 MIXED HYPERLIPIDEMIA: ICD-10-CM

## 2020-07-08 NOTE — TELEPHONE ENCOUNTER
PATIENT IS REQUESTING A REFILL OF     rosuvastatin (CRESTOR) 20 MG tablet   0 ordered  EditCancel Reorder      venlafaxine XR (EFFEXOR-XR) 150 MG 24 hr capsule  150 mg, Daily         SHE STATES SHE WILL BE HEADING OUT OF TOWN ON Saturday AND WOULD LIKE TO GET THEM BEFORE SHE LEAVES       GOOD CONTACT NUMBER   [phone] (H)    [facility]/pharm[facility] #[id number] - [place], [place] - [address] [address] AT [facility] [phone] [facility] [phone]

## 2020-07-09 RX ORDER — ROSUVASTATIN CALCIUM 20 MG/1
20 TABLET, COATED ORAL DAILY
Qty: 90 TABLET | Refills: 1 | Status: SHIPPED | OUTPATIENT
Start: 2020-07-09 | End: 2021-04-01

## 2020-07-09 RX ORDER — VENLAFAXINE HYDROCHLORIDE 150 MG/1
150 CAPSULE, EXTENDED RELEASE ORAL DAILY
Qty: 90 CAPSULE | Refills: 1 | Status: SHIPPED | OUTPATIENT
Start: 2020-07-09 | End: 2020-12-28

## 2020-08-14 RX ORDER — CANDESARTAN 16 MG/1
TABLET ORAL
Qty: 90 TABLET | Refills: 0 | Status: SHIPPED | OUTPATIENT
Start: 2020-08-14 | End: 2020-11-19

## 2020-09-23 ENCOUNTER — LAB (OUTPATIENT)
Dept: FAMILY MEDICINE CLINIC | Facility: CLINIC | Age: 70
End: 2020-09-23

## 2020-09-23 DIAGNOSIS — F41.1 ANXIETY, GENERALIZED: ICD-10-CM

## 2020-09-23 DIAGNOSIS — R73.9 ELEVATED BLOOD SUGAR: ICD-10-CM

## 2020-09-23 DIAGNOSIS — I10 BENIGN ESSENTIAL HTN: ICD-10-CM

## 2020-09-23 DIAGNOSIS — E78.2 MIXED HYPERLIPIDEMIA: Primary | ICD-10-CM

## 2020-09-23 DIAGNOSIS — E78.2 MIXED HYPERLIPIDEMIA: ICD-10-CM

## 2020-09-24 LAB
ALBUMIN SERPL-MCNC: 4.7 G/DL (ref 3.5–5.2)
ALBUMIN/GLOB SERPL: 2 G/DL
ALP SERPL-CCNC: 51 U/L (ref 39–117)
ALT SERPL-CCNC: 39 U/L (ref 1–33)
AST SERPL-CCNC: 33 U/L (ref 1–32)
BASOPHILS # BLD AUTO: 0.08 10*3/MM3 (ref 0–0.2)
BASOPHILS NFR BLD AUTO: 0.7 % (ref 0–1.5)
BILIRUB SERPL-MCNC: 0.4 MG/DL (ref 0–1.2)
BUN SERPL-MCNC: 19 MG/DL (ref 8–23)
BUN/CREAT SERPL: 24.7 (ref 7–25)
CALCIUM SERPL-MCNC: 9.8 MG/DL (ref 8.6–10.5)
CHLORIDE SERPL-SCNC: 98 MMOL/L (ref 98–107)
CHOLEST SERPL-MCNC: 183 MG/DL (ref 0–200)
CO2 SERPL-SCNC: 31.6 MMOL/L (ref 22–29)
CREAT SERPL-MCNC: 0.77 MG/DL (ref 0.57–1)
EOSINOPHIL # BLD AUTO: 0.38 10*3/MM3 (ref 0–0.4)
EOSINOPHIL NFR BLD AUTO: 3.6 % (ref 0.3–6.2)
ERYTHROCYTE [DISTWIDTH] IN BLOOD BY AUTOMATED COUNT: 12.5 % (ref 12.3–15.4)
GLOBULIN SER CALC-MCNC: 2.3 GM/DL
GLUCOSE SERPL-MCNC: 95 MG/DL (ref 65–99)
HBA1C MFR BLD: 6 % (ref 4.8–5.6)
HCT VFR BLD AUTO: 39.5 % (ref 34–46.6)
HDLC SERPL-MCNC: 68 MG/DL (ref 40–60)
HGB BLD-MCNC: 13.7 G/DL (ref 12–15.9)
IMM GRANULOCYTES # BLD AUTO: 0.05 10*3/MM3 (ref 0–0.05)
IMM GRANULOCYTES NFR BLD AUTO: 0.5 % (ref 0–0.5)
LDLC SERPL CALC-MCNC: 85 MG/DL (ref 0–100)
LDLC/HDLC SERPL: 1.25 {RATIO}
LYMPHOCYTES # BLD AUTO: 3.1 10*3/MM3 (ref 0.7–3.1)
LYMPHOCYTES NFR BLD AUTO: 29 % (ref 19.6–45.3)
MCH RBC QN AUTO: 30.8 PG (ref 26.6–33)
MCHC RBC AUTO-ENTMCNC: 34.7 G/DL (ref 31.5–35.7)
MCV RBC AUTO: 88.8 FL (ref 79–97)
MONOCYTES # BLD AUTO: 0.69 10*3/MM3 (ref 0.1–0.9)
MONOCYTES NFR BLD AUTO: 6.5 % (ref 5–12)
NEUTROPHILS # BLD AUTO: 6.39 10*3/MM3 (ref 1.7–7)
NEUTROPHILS NFR BLD AUTO: 59.7 % (ref 42.7–76)
NRBC BLD AUTO-RTO: 0.1 /100 WBC (ref 0–0.2)
PLATELET # BLD AUTO: 333 10*3/MM3 (ref 140–450)
POTASSIUM SERPL-SCNC: 3.9 MMOL/L (ref 3.5–5.2)
PROT SERPL-MCNC: 7 G/DL (ref 6–8.5)
RBC # BLD AUTO: 4.45 10*6/MM3 (ref 3.77–5.28)
SODIUM SERPL-SCNC: 142 MMOL/L (ref 136–145)
T4 FREE SERPL-MCNC: 1.14 NG/DL (ref 0.93–1.7)
TRIGL SERPL-MCNC: 151 MG/DL (ref 0–150)
TSH SERPL DL<=0.005 MIU/L-ACNC: 2.42 UIU/ML (ref 0.27–4.2)
VLDLC SERPL CALC-MCNC: 30.2 MG/DL
WBC # BLD AUTO: 10.69 10*3/MM3 (ref 3.4–10.8)

## 2020-09-28 ENCOUNTER — OFFICE VISIT (OUTPATIENT)
Dept: FAMILY MEDICINE CLINIC | Facility: CLINIC | Age: 70
End: 2020-09-28

## 2020-09-28 VITALS
WEIGHT: 154 LBS | DIASTOLIC BLOOD PRESSURE: 76 MMHG | RESPIRATION RATE: 16 BRPM | TEMPERATURE: 97.7 F | HEART RATE: 92 BPM | OXYGEN SATURATION: 97 % | BODY MASS INDEX: 25.66 KG/M2 | HEIGHT: 65 IN | SYSTOLIC BLOOD PRESSURE: 132 MMHG

## 2020-09-28 DIAGNOSIS — F41.1 ANXIETY, GENERALIZED: ICD-10-CM

## 2020-09-28 DIAGNOSIS — I10 BENIGN ESSENTIAL HTN: Primary | ICD-10-CM

## 2020-09-28 DIAGNOSIS — H65.03 BILATERAL ACUTE SEROUS OTITIS MEDIA, RECURRENCE NOT SPECIFIED: ICD-10-CM

## 2020-09-28 DIAGNOSIS — E78.2 MIXED HYPERLIPIDEMIA: ICD-10-CM

## 2020-09-28 DIAGNOSIS — R01.1 HEART MURMUR, SYSTOLIC: ICD-10-CM

## 2020-09-28 LAB
APPEARANCE UR: CLEAR
BACTERIA #/AREA URNS HPF: ABNORMAL /HPF
BILIRUB UR QL STRIP: NEGATIVE
CASTS URNS MICRO: ABNORMAL
COLOR UR: ABNORMAL
EPI CELLS #/AREA URNS HPF: ABNORMAL /HPF
GLUCOSE UR QL: NEGATIVE
HGB UR QL STRIP: NEGATIVE
KETONES UR QL STRIP: NEGATIVE
LEUKOCYTE ESTERASE UR QL STRIP: ABNORMAL
NITRITE UR QL STRIP: NEGATIVE
PH UR STRIP: 6.5 [PH] (ref 5–8)
PROT UR QL STRIP: ABNORMAL
RBC #/AREA URNS HPF: ABNORMAL /HPF
SP GR UR: 1.02 (ref 1–1.03)
UROBILINOGEN UR STRIP-MCNC: ABNORMAL MG/DL
WBC #/AREA URNS HPF: ABNORMAL /HPF

## 2020-09-28 PROCEDURE — 99214 OFFICE O/P EST MOD 30 MIN: CPT | Performed by: NURSE PRACTITIONER

## 2020-09-28 RX ORDER — AMOXICILLIN AND CLAVULANATE POTASSIUM 875; 125 MG/1; MG/1
1 TABLET, FILM COATED ORAL 2 TIMES DAILY
Qty: 14 TABLET | Refills: 0 | Status: SHIPPED | OUTPATIENT
Start: 2020-09-28 | End: 2020-10-05

## 2020-09-28 RX ORDER — AZELASTINE 1 MG/ML
1 SPRAY, METERED NASAL 2 TIMES DAILY
Qty: 1 EACH | Refills: 0 | Status: SHIPPED | OUTPATIENT
Start: 2020-09-28 | End: 2020-10-20

## 2020-09-28 RX ORDER — FLUTICASONE PROPIONATE 50 MCG
1 SPRAY, SUSPENSION (ML) NASAL 2 TIMES DAILY
Qty: 1 BOTTLE | Refills: 0 | Status: SHIPPED | OUTPATIENT
Start: 2020-09-28 | End: 2020-10-20

## 2020-09-28 NOTE — PATIENT INSTRUCTIONS
HTN: stable, continue atacand and HCTZ as prescribed. Continue heart healthy diet, drinking plenty of water and exercise 3-5 times a week.    HLD: stable, continue crestor as prescribed    GERD: stable, no complaints at this time, continue protonix as prescribed.    Anxiety: stable, no complaints at this time, continue effexor as prescribed    Serous otitis/sinusitis: start nasal sprays and augmentin, take as prescribed, if no improvement in 14 days will do additional labs (ESR).

## 2020-09-28 NOTE — PROGRESS NOTES
Subjective     Teagan Cates is a 70 y.o.. female.     Pt here today for f/u on HTN, HLD, and anxiety. Pt stating she checks her b/p at home every couple of days with averages around 120's/70's. Pt c/o left side headaches, congestion and post nasal drip. Pt denies fevers, body aches, and n/v/d. Pt's b/p in office on 2nd check was 118/64.     Hypertension  This is a chronic problem. The current episode started more than 1 year ago. The problem has been resolved since onset. The problem is controlled. Associated symptoms include headaches. Pertinent negatives include no anxiety, blurred vision, chest pain, malaise/fatigue, neck pain, orthopnea, palpitations, peripheral edema, PND, shortness of breath or sweats. Agents associated with hypertension include decongestants and NSAIDs. Risk factors for coronary artery disease include dyslipidemia, family history and post-menopausal state.       The following portions of the patient's history were reviewed and updated as appropriate: allergies, current medications, past family history, past medical history, past social history, past surgical history and problem list.    Past Medical History:   Diagnosis Date   • Anxiety    • GERD (gastroesophageal reflux disease)    • Hyperlipidemia    • Hypertension        Past Surgical History:   Procedure Laterality Date   • ABDOMINOPLASTY     • CATARACT EXTRACTION W/ INTRAOCULAR LENS IMPLANT Bilateral    • CHOLECYSTECTOMY     • COLONOSCOPY  02/2016    normal per pt    • ENDOSCOPY N/A 5/2/2017    large HH, gastritis, multiple gastric polyps, nodule in duodenum   • HYSTERECTOMY     • MOHS SURGERY  2017   • TUBAL ABDOMINAL LIGATION         Review of Systems   Constitutional: Negative.  Negative for fever and malaise/fatigue.   HENT: Positive for congestion and postnasal drip. Negative for ear pain, rhinorrhea and sore throat.         Left side of face hurting  Mucinex, azithromycin     Eyes: Negative.  Negative for blurred vision,  "photophobia, pain, redness and visual disturbance.   Respiratory: Negative.  Negative for shortness of breath.    Cardiovascular: Negative.  Negative for chest pain, palpitations, orthopnea and PND.   Gastrointestinal: Negative.  Negative for diarrhea, nausea and vomiting.   Genitourinary: Negative.  Negative for dysuria, frequency and urgency.   Musculoskeletal: Negative.  Negative for neck pain.   Neurological: Positive for headaches. Negative for dizziness, speech difficulty, light-headedness and numbness.       Allergies   Allergen Reactions   • Sulfa Antibiotics Anaphylaxis     anaphylaxis   • Iodinated Diagnostic Agents Rash       Objective     Vitals:    09/28/20 0803   BP: 132/76   BP Location: Left arm   Patient Position: Sitting   Cuff Size: Adult   Pulse: 92   Resp: 16   Temp: 97.7 °F (36.5 °C)   SpO2: 97%   Weight: 69.9 kg (154 lb)   Height: 165 cm (64.96\")     Body mass index is 25.66 kg/m².    Physical Exam  Vitals signs reviewed.   Constitutional:       Appearance: She is well-developed.   HENT:      Head: Normocephalic and atraumatic.      Right Ear: A middle ear effusion (clear fluid noted) is present. Tympanic membrane is not erythematous.      Left Ear: A middle ear effusion (clear fluid noted) is present. Tympanic membrane is not erythematous.      Nose: Nose normal.      Right Sinus: No maxillary sinus tenderness or frontal sinus tenderness.      Left Sinus: No maxillary sinus tenderness or frontal sinus tenderness.      Mouth/Throat:      Mouth: Mucous membranes are moist.      Pharynx: No pharyngeal swelling, oropharyngeal exudate (pnd) or posterior oropharyngeal erythema.   Eyes:      Conjunctiva/sclera: Conjunctivae normal.      Pupils: Pupils are equal, round, and reactive to light.   Cardiovascular:      Rate and Rhythm: Normal rate and regular rhythm.      Heart sounds: Murmur present.      Comments: Radial and DP pulse wnl  No ankle edema noted  Pulmonary:      Effort: Pulmonary effort " is normal.      Breath sounds: No wheezing, rhonchi or rales.   Musculoskeletal: Normal range of motion.   Lymphadenopathy:      Cervical: No cervical adenopathy.   Skin:     General: Skin is warm and dry.   Neurological:      Mental Status: She is alert and oriented to person, place, and time.   Psychiatric:         Behavior: Behavior normal.           Current Outpatient Medications:   •  amoxicillin-clavulanate (Augmentin) 875-125 MG per tablet, Take 1 tablet by mouth 2 (Two) Times a Day for 7 days., Disp: 14 tablet, Rfl: 0  •  azelastine (ASTELIN) 0.1 % nasal spray, 1 spray into the nostril(s) as directed by provider 2 (Two) Times a Day for 14 days. Use in each nostril as directed, Disp: 1 each, Rfl: 0  •  candesartan (ATACAND) 16 MG tablet, TAKE 1 TABLET BY MOUTH EVERY DAY, Disp: 90 tablet, Rfl: 0  •  fluticasone (FLONASE) 50 MCG/ACT nasal spray, 1 spray into the nostril(s) as directed by provider 2 (two) times a day for 14 days., Disp: 1 bottle, Rfl: 0  •  hydroCHLOROthiazide (HYDRODIURIL) 12.5 MG tablet, Take 1 tablet by mouth Daily., Disp: 90 tablet, Rfl: 3  •  pantoprazole (PROTONIX) 40 MG EC tablet, TAKE 1 TABLET BY MOUTH EVERY DAY, Disp: 90 tablet, Rfl: 1  •  rosuvastatin (CRESTOR) 20 MG tablet, Take 1 tablet by mouth Daily., Disp: 90 tablet, Rfl: 1  •  venlafaxine XR (EFFEXOR-XR) 150 MG 24 hr capsule, Take 1 capsule by mouth Daily., Disp: 90 capsule, Rfl: 1    Recent Results (from the past 2016 hour(s))   Lipid Panel With LDL / HDL Ratio    Collection Time: 09/23/20  8:40 AM    Specimen: Blood   Result Value Ref Range    Total Cholesterol 183 0 - 200 mg/dL    Triglycerides 151 (H) 0 - 150 mg/dL    HDL Cholesterol 68 (H) 40 - 60 mg/dL    VLDL Cholesterol Joni 30.2 mg/dL    LDL Chol Calc (NIH) 85 0 - 100 mg/dL    LDL/HDL RATIO 1.25    CBC & Differential    Collection Time: 09/23/20  8:40 AM   Result Value Ref Range    WBC 10.69 3.40 - 10.80 10*3/mm3    RBC 4.45 3.77 - 5.28 10*6/mm3    Hemoglobin 13.7 12.0 -  15.9 g/dL    Hematocrit 39.5 34.0 - 46.6 %    MCV 88.8 79.0 - 97.0 fL    MCH 30.8 26.6 - 33.0 pg    MCHC 34.7 31.5 - 35.7 g/dL    RDW 12.5 12.3 - 15.4 %    Platelets 333 140 - 450 10*3/mm3    Neutrophil Rel % 59.7 42.7 - 76.0 %    Lymphocyte Rel % 29.0 19.6 - 45.3 %    Monocyte Rel % 6.5 5.0 - 12.0 %    Eosinophil Rel % 3.6 0.3 - 6.2 %    Basophil Rel % 0.7 0.0 - 1.5 %    Neutrophils Absolute 6.39 1.70 - 7.00 10*3/mm3    Lymphocytes Absolute 3.10 0.70 - 3.10 10*3/mm3    Monocytes Absolute 0.69 0.10 - 0.90 10*3/mm3    Eosinophils Absolute 0.38 0.00 - 0.40 10*3/mm3    Basophils Absolute 0.08 0.00 - 0.20 10*3/mm3    Immature Granulocyte Rel % 0.5 0.0 - 0.5 %    Immature Grans Absolute 0.05 0.00 - 0.05 10*3/mm3    nRBC 0.1 0.0 - 0.2 /100 WBC   Comprehensive Metabolic Panel    Collection Time: 09/23/20  8:40 AM   Result Value Ref Range    Glucose 95 65 - 99 mg/dL    BUN 19 8 - 23 mg/dL    Creatinine 0.77 0.57 - 1.00 mg/dL    eGFR Non African Am 74 >60 mL/min/1.73    eGFR African Am 90 >60 mL/min/1.73    BUN/Creatinine Ratio 24.7 7.0 - 25.0    Sodium 142 136 - 145 mmol/L    Potassium 3.9 3.5 - 5.2 mmol/L    Chloride 98 98 - 107 mmol/L    Total CO2 31.6 (H) 22.0 - 29.0 mmol/L    Calcium 9.8 8.6 - 10.5 mg/dL    Total Protein 7.0 6.0 - 8.5 g/dL    Albumin 4.70 3.50 - 5.20 g/dL    Globulin 2.3 gm/dL    A/G Ratio 2.0 g/dL    Total Bilirubin 0.4 0.0 - 1.2 mg/dL    Alkaline Phosphatase 51 39 - 117 U/L    AST (SGOT) 33 (H) 1 - 32 U/L    ALT (SGPT) 39 (H) 1 - 33 U/L   Hemoglobin A1c    Collection Time: 09/23/20  8:40 AM   Result Value Ref Range    Hemoglobin A1C 6.00 (H) 4.80 - 5.60 %   T4, Free    Collection Time: 09/23/20  8:40 AM   Result Value Ref Range    Free T4 1.14 0.93 - 1.70 ng/dL   TSH    Collection Time: 09/23/20  8:40 AM   Result Value Ref Range    TSH 2.420 0.270 - 4.200 uIU/mL   Answers for HPI/ROS submitted by the patient on 9/26/2020   Hypertension  What is the primary reason for your visit?: High Blood  Pressure        Assessment/Plan   Teagan was seen today for hyperlipidemia, hypertension, anxiety and hyperglycemia.    Diagnoses and all orders for this visit:    Benign essential HTN    Mixed hyperlipidemia    Anxiety, generalized    Heart murmur, systolic    Bilateral acute serous otitis media, recurrence not specified  -     amoxicillin-clavulanate (Augmentin) 875-125 MG per tablet; Take 1 tablet by mouth 2 (Two) Times a Day for 7 days.  -     fluticasone (FLONASE) 50 MCG/ACT nasal spray; 1 spray into the nostril(s) as directed by provider 2 (two) times a day for 14 days.  -     azelastine (ASTELIN) 0.1 % nasal spray; 1 spray into the nostril(s) as directed by provider 2 (Two) Times a Day for 14 days. Use in each nostril as directed        Patient Instructions   HTN: stable, continue atacand and HCTZ as prescribed. Continue heart healthy diet, drinking plenty of water and exercise 3-5 times a week.    HLD: stable, continue crestor as prescribed    GERD: stable, no complaints at this time, continue protonix as prescribed.    Anxiety: stable, no complaints at this time, continue effexor as prescribed    Serous otitis/sinusitis: start nasal sprays and augmentin, take as prescribed, if no improvement in 14 days will do additional labs (ESR).       Return if symptoms worsen or fail to improve, for Medicare Wellness.

## 2020-10-20 DIAGNOSIS — H65.03 BILATERAL ACUTE SEROUS OTITIS MEDIA, RECURRENCE NOT SPECIFIED: ICD-10-CM

## 2020-10-20 RX ORDER — FLUTICASONE PROPIONATE 50 MCG
1 SPRAY, SUSPENSION (ML) NASAL 2 TIMES DAILY
Qty: 16 ML | Refills: 0 | Status: SHIPPED | OUTPATIENT
Start: 2020-10-20 | End: 2020-11-09 | Stop reason: SDUPTHER

## 2020-10-20 RX ORDER — AZELASTINE 1 MG/ML
1 SPRAY, METERED NASAL 2 TIMES DAILY
Qty: 1 EACH | Refills: 1 | Status: SHIPPED | OUTPATIENT
Start: 2020-10-20 | End: 2020-11-03

## 2020-11-09 DIAGNOSIS — H65.03 BILATERAL ACUTE SEROUS OTITIS MEDIA, RECURRENCE NOT SPECIFIED: ICD-10-CM

## 2020-11-09 RX ORDER — AZELASTINE 1 MG/ML
1 SPRAY, METERED NASAL 2 TIMES DAILY
Qty: 1 EACH | Refills: 2 | Status: SHIPPED | OUTPATIENT
Start: 2020-11-09 | End: 2020-11-23

## 2020-11-09 RX ORDER — FLUTICASONE PROPIONATE 50 MCG
1 SPRAY, SUSPENSION (ML) NASAL 2 TIMES DAILY
Qty: 16 ML | Refills: 2 | Status: SHIPPED | OUTPATIENT
Start: 2020-11-09 | End: 2021-04-08

## 2020-11-19 RX ORDER — CANDESARTAN 16 MG/1
TABLET ORAL
Qty: 90 TABLET | Refills: 1 | Status: SHIPPED | OUTPATIENT
Start: 2020-11-19 | End: 2021-05-19

## 2020-12-28 RX ORDER — VENLAFAXINE HYDROCHLORIDE 150 MG/1
CAPSULE, EXTENDED RELEASE ORAL
Qty: 90 CAPSULE | Refills: 1 | Status: SHIPPED | OUTPATIENT
Start: 2020-12-28 | End: 2021-06-18

## 2021-01-03 DIAGNOSIS — R12 HEARTBURN: ICD-10-CM

## 2021-01-03 DIAGNOSIS — K21.9 GASTROESOPHAGEAL REFLUX DISEASE WITHOUT ESOPHAGITIS: ICD-10-CM

## 2021-01-03 DIAGNOSIS — K44.9 HIATAL HERNIA: ICD-10-CM

## 2021-01-04 RX ORDER — PANTOPRAZOLE SODIUM 40 MG/1
TABLET, DELAYED RELEASE ORAL
Qty: 90 TABLET | Refills: 0 | Status: SHIPPED | OUTPATIENT
Start: 2021-01-04 | End: 2021-03-03

## 2021-01-06 ENCOUNTER — APPOINTMENT (OUTPATIENT)
Dept: WOMENS IMAGING | Facility: HOSPITAL | Age: 71
End: 2021-01-06

## 2021-01-06 PROCEDURE — 77063 BREAST TOMOSYNTHESIS BI: CPT | Performed by: RADIOLOGY

## 2021-01-06 PROCEDURE — 77067 SCR MAMMO BI INCL CAD: CPT | Performed by: RADIOLOGY

## 2021-03-01 ENCOUNTER — OFFICE VISIT (OUTPATIENT)
Dept: FAMILY MEDICINE CLINIC | Facility: CLINIC | Age: 71
End: 2021-03-01

## 2021-03-01 VITALS
WEIGHT: 154.2 LBS | RESPIRATION RATE: 16 BRPM | BODY MASS INDEX: 25.69 KG/M2 | HEIGHT: 65 IN | TEMPERATURE: 97.3 F | OXYGEN SATURATION: 100 % | DIASTOLIC BLOOD PRESSURE: 68 MMHG | SYSTOLIC BLOOD PRESSURE: 112 MMHG | HEART RATE: 87 BPM

## 2021-03-01 DIAGNOSIS — Z00.00 MEDICARE ANNUAL WELLNESS VISIT, SUBSEQUENT: Primary | ICD-10-CM

## 2021-03-01 PROCEDURE — G0439 PPPS, SUBSEQ VISIT: HCPCS | Performed by: NURSE PRACTITIONER

## 2021-03-01 NOTE — PROGRESS NOTES
The ABCs of the Annual Wellness Visit  Subsequent Medicare Wellness Visit    Chief Complaint   Patient presents with   • Medicare Wellness-Initial Visit       History of Present Illness:  Teagan Cates is a 70 y.o. female who presents for a Subsequent Medicare Wellness Visit.  Subjective   HEALTH RISK ASSESSMENT    Recent Hospitalizations:  No hospitalization(s) within the last year.    Current Medical Providers:  Patient Care Team:  Mary Urbano APRN as PCP - General (Family Medicine)    Smoking Status:  Social History     Tobacco Use   Smoking Status Never Smoker   Smokeless Tobacco Never Used       Alcohol Consumption:  Social History     Substance and Sexual Activity   Alcohol Use Yes    Comment: SOCIAL       Depression Screen:   PHQ-2/PHQ-9 Depression Screening 3/1/2021   Little interest or pleasure in doing things 0   Feeling down, depressed, or hopeless 0   Trouble falling or staying asleep, or sleeping too much -   Feeling tired or having little energy -   Poor appetite or overeating -   Feeling bad about yourself - or that you are a failure or have let yourself or your family down -   Trouble concentrating on things, such as reading the newspaper or watching television -   Moving or speaking so slowly that other people could have noticed. Or the opposite - being so fidgety or restless that you have been moving around a lot more than usual -   Total Score 0   If you checked off any problems, how difficult have these problems made it for you to do your work, take care of things at home, or get along with other people? -       Fall Risk Screen:  ANU Fall Risk Assessment was completed, and patient is at LOW risk for falls.Assessment completed on:3/1/2021    Health Habits and Functional and Cognitive Screening:  Functional & Cognitive Status 3/1/2021   Do you have difficulty preparing food and eating? No   Do you have difficulty bathing yourself, getting dressed or grooming yourself? No   Do you have  difficulty using the toilet? No   Do you have difficulty moving around from place to place? No   Do you have trouble with steps or getting out of a bed or a chair? No   Current Diet Well Balanced Diet   Dental Exam Up to date   Eye Exam Up to date   Exercise (times per week) 2 times per week   Current Exercise Activities Include Walking   Do you need help using the phone?  No   Are you deaf or do you have serious difficulty hearing?  No   Do you need help with transportation? No   Do you need help shopping? No   Do you need help preparing meals?  No   Do you need help with housework?  No   Do you need help with laundry? No   Do you need help taking your medications? No   Do you need help managing money? No   Do you ever drive or ride in a car without wearing a seat belt? No   Have you felt unusual stress, anger or loneliness in the last month? No   Who do you live with? Spouse   If you need help, do you have trouble finding someone available to you? No   Have you been bothered in the last four weeks by sexual problems? No   Do you have difficulty concentrating, remembering or making decisions? No       Does the patient have evidence of cognitive impairment? No    Asprin use counseling:Does not need ASA (and currently is not on it)    Age-appropriate Screening Schedule:  Refer to the list below for future screening recommendations based on patient's age, sex and/or medical conditions. Orders for these recommended tests are listed in the plan section. The patient has been provided with a written plan.    Health Maintenance   Topic Date Due   • DXA SCAN  1950   • TDAP/TD VACCINES (2 - Td) 10/28/2020   • MAMMOGRAM  12/05/2020   • LIPID PANEL  09/23/2021   • COLONOSCOPY  06/14/2028   • INFLUENZA VACCINE  Completed   • ZOSTER VACCINE  Completed   • PAP SMEAR  Discontinued          The following portions of the patient's history were reviewed and updated as appropriate: allergies, current medications, past family  "history, past medical history, past social history, past surgical history and problem list.    Outpatient Medications Prior to Visit   Medication Sig Dispense Refill   • candesartan (ATACAND) 16 MG tablet TAKE 1 TABLET BY MOUTH EVERY DAY 90 tablet 1   • hydroCHLOROthiazide (HYDRODIURIL) 12.5 MG tablet Take 1 tablet by mouth Daily. 90 tablet 3   • pantoprazole (PROTONIX) 40 MG EC tablet TAKE 1 TABLET BY MOUTH EVERY DAY 90 tablet 0   • rosuvastatin (CRESTOR) 20 MG tablet Take 1 tablet by mouth Daily. 90 tablet 1   • venlafaxine XR (EFFEXOR-XR) 150 MG 24 hr capsule TAKE 1 CAPSULE BY MOUTH EVERY DAY 90 capsule 1   • fluticasone (FLONASE) 50 MCG/ACT nasal spray 1 spray into the nostril(s) as directed by provider 2 (two) times a day for 14 days. 16 mL 2     No facility-administered medications prior to visit.        Patient Active Problem List   Diagnosis   • Benign essential HTN   • HLD (hyperlipidemia)   • Acid reflux   • Anxiety, generalized   • Heart murmur, systolic   • HH (hiatus hernia)       Advanced Care Planning:  ACP discussion was held with the patient during this visit. Patient does not have an advance directive, declines further assistance.    Review of Systems   Constitutional: Negative.    Respiratory: Negative.    Cardiovascular: Negative.        Compared to one year ago, the patient feels her physical health is better.  Compared to one year ago, the patient feels her mental health is the same.    Reviewed chart for potential of high risk medication in the elderly: yes  Reviewed chart for potential of harmful drug interactions in the elderly:yes    Objective         Vitals:    03/01/21 0823   BP: 112/68   Pulse: 87   Resp: 16   Temp: 97.3 °F (36.3 °C)   TempSrc: Oral   SpO2: 100%   Weight: 69.9 kg (154 lb 3.2 oz)   Height: 165 cm (64.96\")       Body mass index is 25.69 kg/m².  Discussed the patient's BMI with her. The BMI is in the acceptable range.    Physical Exam  Vitals signs reviewed.   HENT:      " Head: Normocephalic.   Eyes:      Pupils: Pupils are equal, round, and reactive to light.   Neck:      Vascular: No carotid bruit.   Cardiovascular:      Rate and Rhythm: Normal rate and regular rhythm.      Pulses: Normal pulses.      Heart sounds: Murmur present.   Pulmonary:      Effort: Pulmonary effort is normal.      Breath sounds: Normal breath sounds.   Musculoskeletal: Normal range of motion.      Right lower leg: No edema.      Left lower leg: No edema.   Skin:     General: Skin is warm and dry.   Neurological:      Mental Status: She is alert and oriented to person, place, and time.   Psychiatric:         Behavior: Behavior normal.           Assessment/Plan   Medicare Risks and Personalized Health Plan  CMS Preventative Services Quick Reference  Advance Directive Discussion  Breast Cancer/Mammogram Screening  Cardiovascular risk  Immunizations Discussed/Encouraged (specific immunizations; adacel Tdap )  Osteoprorosis Risk    The above risks/problems have been discussed with the patient.  Pertinent information has been shared with the patient in the After Visit Summary.  Follow up plans and orders are seen below in the Assessment/Plan Section.    Diagnoses and all orders for this visit:    1. Medicare annual wellness visit, subsequent (Primary)      Follow Up:  Return for keep appt. in April for 6 month follow up.     An After Visit Summary and PPPS were given to the patient.       Following up with RELL Cardoso, this Wednesday for hiatal hernia re-eval  Pt stating she would get Dexa scan and mammogram results to us.  Pt directed to local pharmacy for Tdap.

## 2021-03-03 ENCOUNTER — OFFICE VISIT (OUTPATIENT)
Dept: GASTROENTEROLOGY | Facility: CLINIC | Age: 71
End: 2021-03-03

## 2021-03-03 VITALS
BODY MASS INDEX: 25.66 KG/M2 | WEIGHT: 154 LBS | SYSTOLIC BLOOD PRESSURE: 125 MMHG | DIASTOLIC BLOOD PRESSURE: 70 MMHG | HEIGHT: 65 IN

## 2021-03-03 DIAGNOSIS — K44.9 HIATAL HERNIA: ICD-10-CM

## 2021-03-03 DIAGNOSIS — K21.9 GASTROESOPHAGEAL REFLUX DISEASE WITHOUT ESOPHAGITIS: Primary | ICD-10-CM

## 2021-03-03 DIAGNOSIS — R74.01 ELEVATED TRANSAMINASE LEVEL: ICD-10-CM

## 2021-03-03 PROCEDURE — 99204 OFFICE O/P NEW MOD 45 MIN: CPT | Performed by: INTERNAL MEDICINE

## 2021-03-03 RX ORDER — SODIUM CHLORIDE, SODIUM LACTATE, POTASSIUM CHLORIDE, CALCIUM CHLORIDE 600; 310; 30; 20 MG/100ML; MG/100ML; MG/100ML; MG/100ML
30 INJECTION, SOLUTION INTRAVENOUS CONTINUOUS
Status: CANCELLED | OUTPATIENT
Start: 2021-03-15

## 2021-03-03 RX ORDER — OMEPRAZOLE 40 MG/1
40 CAPSULE, DELAYED RELEASE ORAL
Qty: 30 CAPSULE | Refills: 2 | Status: SHIPPED | OUTPATIENT
Start: 2021-03-03 | End: 2021-05-18

## 2021-03-03 NOTE — H&P (VIEW-ONLY)
Chief Complaint   Patient presents with   • Heartburn       Subjective     HPI    Teagan Cates is a 70 y.o. female with a past medical history noted below who presents for evaluation of heartburn.  She was last seen greater than 3 years ago.    She is describing issues with burning acid in her chest, sometimes regurgitation.  This is despite daily 40 mg pantoprazole every morning.  No dysphagia but she does frequently feel full/ early satiety.  She denies any change in her weight.    Liver enzymes mildly elevated since 2018.  She denies a family history of liver issues, no right upper quadrant pain.  She denies any herbal supplements, no excess Tylenol though does drink regularly and review of her records indicates that she had a fall January 2020 following consumption of wine.    EGD 5/2/17-- notable for fundic gland polyps, gastric and duodenal inflammation. She also had a large 5cm hernia    She reports last colonoscopy about 2018 though it is not in the chart.    No family history of GI malignancies    No smoking.  Daily wine    Retired.      Today's visit was in the office.  Both the patient and I were wearing face masks and proper hand hygiene was performed before and after the physical exam.           Current Outpatient Medications:   •  candesartan (ATACAND) 16 MG tablet, TAKE 1 TABLET BY MOUTH EVERY DAY, Disp: 90 tablet, Rfl: 1  •  hydroCHLOROthiazide (HYDRODIURIL) 12.5 MG tablet, Take 1 tablet by mouth Daily., Disp: 90 tablet, Rfl: 3  •  rosuvastatin (CRESTOR) 20 MG tablet, Take 1 tablet by mouth Daily., Disp: 90 tablet, Rfl: 1  •  venlafaxine XR (EFFEXOR-XR) 150 MG 24 hr capsule, TAKE 1 CAPSULE BY MOUTH EVERY DAY, Disp: 90 capsule, Rfl: 1  •  fluticasone (FLONASE) 50 MCG/ACT nasal spray, 1 spray into the nostril(s) as directed by provider 2 (two) times a day for 14 days., Disp: 16 mL, Rfl: 2  •  omeprazole (priLOSEC) 40 MG capsule, Take 1 capsule by mouth Every Morning Before Breakfast., Disp: 30  capsule, Rfl: 2      Objective     Vitals:    03/03/21 1502   BP: 125/70         03/03/21  1502   Weight: 69.9 kg (154 lb)     Body mass index is 25.63 kg/m².    Physical Exam  Vitals signs reviewed.   Constitutional:       General: She is not in acute distress.     Appearance: Normal appearance. She is well-developed. She is not diaphoretic.   HENT:      Head: Normocephalic.   Neck:      Thyroid: No thyromegaly.   Cardiovascular:      Rate and Rhythm: Normal rate and regular rhythm.   Pulmonary:      Effort: Pulmonary effort is normal. No respiratory distress.      Breath sounds: Normal breath sounds. No wheezing.   Abdominal:      General: Bowel sounds are normal. There is no distension.      Palpations: Abdomen is soft. Abdomen is not rigid. There is no mass.      Tenderness: There is no abdominal tenderness. There is no guarding or rebound.      Hernia: No hernia is present.   Genitourinary:     Comments: Rectal exam deferred  Musculoskeletal:         General: No tenderness.   Neurological:      Mental Status: She is alert and oriented to person, place, and time.      Motor: No atrophy.      Coordination: Coordination normal.   Psychiatric:         Behavior: Behavior normal.         Thought Content: Thought content normal.         Judgment: Judgment normal.             WBC   Date Value Ref Range Status   09/23/2020 10.69 3.40 - 10.80 10*3/mm3 Final     RBC   Date Value Ref Range Status   09/23/2020 4.45 3.77 - 5.28 10*6/mm3 Final     Hemoglobin   Date Value Ref Range Status   09/23/2020 13.7 12.0 - 15.9 g/dL Final   06/22/2019 12.6 12.0 - 15.9 g/dL Final     Hematocrit   Date Value Ref Range Status   09/23/2020 39.5 34.0 - 46.6 % Final   06/22/2019 38.9 34.0 - 46.6 % Final     MCV   Date Value Ref Range Status   09/23/2020 88.8 79.0 - 97.0 fL Final   06/22/2019 96.8 79.0 - 97.0 fL Final     MCH   Date Value Ref Range Status   09/23/2020 30.8 26.6 - 33.0 pg Final   06/22/2019 31.3 26.6 - 33.0 pg Final     MCHC    Date Value Ref Range Status   09/23/2020 34.7 31.5 - 35.7 g/dL Final   06/22/2019 32.4 31.5 - 35.7 g/dL Final     RDW   Date Value Ref Range Status   09/23/2020 12.5 12.3 - 15.4 % Final   06/22/2019 12.1 (L) 12.3 - 15.4 % Final     RDW-SD   Date Value Ref Range Status   06/22/2019 43.2 37.0 - 54.0 fl Final     MPV   Date Value Ref Range Status   06/22/2019 9.4 6.0 - 12.0 fL Final     Platelets   Date Value Ref Range Status   09/23/2020 333 140 - 450 10*3/mm3 Final   06/22/2019 279 140 - 450 10*3/mm3 Final     Neutrophil Rel %   Date Value Ref Range Status   09/23/2020 59.7 42.7 - 76.0 % Final     Neutrophil %   Date Value Ref Range Status   06/22/2019 60.0 42.7 - 76.0 % Final     Lymphocyte Rel %   Date Value Ref Range Status   09/23/2020 29.0 19.6 - 45.3 % Final     Lymphocyte %   Date Value Ref Range Status   06/22/2019 31.6 19.6 - 45.3 % Final     Monocyte Rel %   Date Value Ref Range Status   09/23/2020 6.5 5.0 - 12.0 % Final     Monocyte %   Date Value Ref Range Status   06/22/2019 5.5 5.0 - 12.0 % Final     Eosinophil Rel %   Date Value Ref Range Status   09/23/2020 3.6 0.3 - 6.2 % Final     Eosinophil %   Date Value Ref Range Status   06/22/2019 1.9 0.3 - 6.2 % Final     Basophil Rel %   Date Value Ref Range Status   09/23/2020 0.7 0.0 - 1.5 % Final     Basophil %   Date Value Ref Range Status   06/22/2019 0.6 0.0 - 1.5 % Final     Immature Grans %   Date Value Ref Range Status   06/22/2019 0.4 0.0 - 0.5 % Final     Neutrophils Absolute   Date Value Ref Range Status   09/23/2020 6.39 1.70 - 7.00 10*3/mm3 Final     Neutrophils, Absolute   Date Value Ref Range Status   06/22/2019 6.06 1.70 - 7.00 10*3/mm3 Final     Lymphocytes Absolute   Date Value Ref Range Status   09/23/2020 3.10 0.70 - 3.10 10*3/mm3 Final     Lymphocytes, Absolute   Date Value Ref Range Status   06/22/2019 3.19 (H) 0.70 - 3.10 10*3/mm3 Final     Monocytes Absolute   Date Value Ref Range Status   09/23/2020 0.69 0.10 - 0.90 10*3/mm3 Final      Monocytes, Absolute   Date Value Ref Range Status   06/22/2019 0.56 0.10 - 0.90 10*3/mm3 Final     Eosinophils Absolute   Date Value Ref Range Status   09/23/2020 0.38 0.00 - 0.40 10*3/mm3 Final     Eosinophils, Absolute   Date Value Ref Range Status   06/22/2019 0.19 0.00 - 0.40 10*3/mm3 Final     Basophils Absolute   Date Value Ref Range Status   09/23/2020 0.08 0.00 - 0.20 10*3/mm3 Final     Basophils, Absolute   Date Value Ref Range Status   06/22/2019 0.06 0.00 - 0.20 10*3/mm3 Final     Immature Grans, Absolute   Date Value Ref Range Status   06/22/2019 0.04 0.00 - 0.05 10*3/mm3 Final     nRBC   Date Value Ref Range Status   09/23/2020 0.1 0.0 - 0.2 /100 WBC Final   06/22/2019 0.0 0.0 - 0.2 /100 WBC Final       Lab Results   Component Value Date    GLUCOSE 100 (H) 06/22/2019    BUN 19 09/23/2020    CREATININE 0.77 09/23/2020    EGFRIFNONA 74 09/23/2020    EGFRIFAFRI 90 09/23/2020    BCR 24.7 09/23/2020    CO2 31.6 (H) 09/23/2020    CALCIUM 9.8 09/23/2020    PROTENTOTREF 7.0 09/23/2020    ALBUMIN 4.70 09/23/2020    LABIL2 2.0 09/23/2020    AST 33 (H) 09/23/2020    ALT 39 (H) 09/23/2020             I personally reviewed data as detailed below:     The labs listed above.    The radiology studies listed above.    Office notes from: 2017, 2021 PCP note    Endoscopy procedures and pathology from: EGD 5/2017      No notes on file    Assessment/Plan    1.  GERD: Worsening despite daily Protonix.  History of hiatal hernia    2.  Hiatal hernia: About 5 cm.  Probably contributing to above.  She voices interest in possible hernia repair    3.  Elevated transaminase levels: Mild but going back to about 2016.  I suspect this is related to alcohol consumption but also could be related to medications    Plan  We will proceed with EGD for further evaluation of her worsening reflux symptoms  Change pantoprazole to omeprazole  Liver serologic work-up  Liver ultrasound  She wants to wait until after the EGD for consideration  of referral to general surgery for hiatal hernia repair/fundoplication    Diagnoses and all orders for this visit:    1. Gastroesophageal reflux disease without esophagitis (Primary)  -     Case Request; Standing  -     Follow Anesthesia Guidelines / Standing Orders; Future  -     Obtain Informed Consent; Future  -     Implement Anesthesia Orders Day of Procedure; Standing  -     Obtain Informed Consent; Standing  -     lactated ringers infusion  -     Case Request    2. Hiatal hernia  -     Case Request; Standing  -     Follow Anesthesia Guidelines / Standing Orders; Future  -     Obtain Informed Consent; Future  -     Implement Anesthesia Orders Day of Procedure; Standing  -     Obtain Informed Consent; Standing  -     lactated ringers infusion  -     Case Request    3. Elevated transaminase level  -     Alpha - 1 - Antitrypsin Phenotype; Future  -     MIKKI; Future  -     Anti-Smooth Muscle Antibody Titer; Future  -     Ceruloplasmin; Future  -     Ferritin; Future  -     Gamma GT; Future  -     Hepatitis Panel, Acute; Future  -     IgG; Future  -     Mitochondrial Antibodies, M2; Future  -     Iron Profile; Future  -     US Liver; Future    Other orders  -     omeprazole (priLOSEC) 40 MG capsule; Take 1 capsule by mouth Every Morning Before Breakfast.  Dispense: 30 capsule; Refill: 2        I have discussed the above plan with the patient.  They verbalize understanding and are in agreement with the plan.  They have been advised to contact the office for any questions, concerns, or changes related to their health.    Dictated utilizing Dragon dictation

## 2021-03-08 ENCOUNTER — TRANSCRIBE ORDERS (OUTPATIENT)
Dept: ADMINISTRATIVE | Facility: HOSPITAL | Age: 71
End: 2021-03-08

## 2021-03-08 DIAGNOSIS — Z01.818 OTHER SPECIFIED PRE-OPERATIVE EXAMINATION: Primary | ICD-10-CM

## 2021-03-09 ENCOUNTER — TRANSCRIBE ORDERS (OUTPATIENT)
Dept: ADMINISTRATIVE | Facility: HOSPITAL | Age: 71
End: 2021-03-09

## 2021-03-09 DIAGNOSIS — Z23 IMMUNIZATION DUE: ICD-10-CM

## 2021-03-09 DIAGNOSIS — Z13.6 ENCOUNTER FOR SCREENING FOR VASCULAR DISEASE: Primary | ICD-10-CM

## 2021-03-12 ENCOUNTER — LAB (OUTPATIENT)
Dept: LAB | Facility: HOSPITAL | Age: 71
End: 2021-03-12

## 2021-03-12 DIAGNOSIS — Z01.818 OTHER SPECIFIED PRE-OPERATIVE EXAMINATION: ICD-10-CM

## 2021-03-12 PROCEDURE — U0005 INFEC AGEN DETEC AMPLI PROBE: HCPCS

## 2021-03-12 PROCEDURE — C9803 HOPD COVID-19 SPEC COLLECT: HCPCS

## 2021-03-12 PROCEDURE — U0004 COV-19 TEST NON-CDC HGH THRU: HCPCS

## 2021-03-13 LAB — SARS-COV-2 ORF1AB RESP QL NAA+PROBE: NOT DETECTED

## 2021-03-15 ENCOUNTER — HOSPITAL ENCOUNTER (OUTPATIENT)
Facility: HOSPITAL | Age: 71
Setting detail: HOSPITAL OUTPATIENT SURGERY
Discharge: HOME | End: 2021-03-15
Attending: INTERNAL MEDICINE | Admitting: INTERNAL MEDICINE

## 2021-03-15 ENCOUNTER — ANESTHESIA EVENT (OUTPATIENT)
Dept: GASTROENTEROLOGY | Facility: HOSPITAL | Age: 71
End: 2021-03-15

## 2021-03-15 ENCOUNTER — ANESTHESIA (OUTPATIENT)
Dept: GASTROENTEROLOGY | Facility: HOSPITAL | Age: 71
End: 2021-03-15

## 2021-03-15 VITALS
DIASTOLIC BLOOD PRESSURE: 74 MMHG | TEMPERATURE: 97.8 F | WEIGHT: 152 LBS | OXYGEN SATURATION: 92 % | HEIGHT: 65 IN | BODY MASS INDEX: 25.33 KG/M2 | HEART RATE: 85 BPM | SYSTOLIC BLOOD PRESSURE: 137 MMHG | RESPIRATION RATE: 20 BRPM

## 2021-03-15 DIAGNOSIS — K21.9 GASTROESOPHAGEAL REFLUX DISEASE WITHOUT ESOPHAGITIS: ICD-10-CM

## 2021-03-15 DIAGNOSIS — K44.9 HIATAL HERNIA: ICD-10-CM

## 2021-03-15 PROCEDURE — 25010000002 PROPOFOL 10 MG/ML EMULSION: Performed by: ANESTHESIOLOGY

## 2021-03-15 PROCEDURE — 43239 EGD BIOPSY SINGLE/MULTIPLE: CPT | Performed by: INTERNAL MEDICINE

## 2021-03-15 PROCEDURE — 88305 TISSUE EXAM BY PATHOLOGIST: CPT | Performed by: INTERNAL MEDICINE

## 2021-03-15 RX ORDER — PROPOFOL 10 MG/ML
VIAL (ML) INTRAVENOUS CONTINUOUS PRN
Status: DISCONTINUED | OUTPATIENT
Start: 2021-03-15 | End: 2021-03-15 | Stop reason: SURG

## 2021-03-15 RX ORDER — SODIUM CHLORIDE, SODIUM LACTATE, POTASSIUM CHLORIDE, CALCIUM CHLORIDE 600; 310; 30; 20 MG/100ML; MG/100ML; MG/100ML; MG/100ML
30 INJECTION, SOLUTION INTRAVENOUS CONTINUOUS
Status: DISCONTINUED | OUTPATIENT
Start: 2021-03-15 | End: 2021-03-15 | Stop reason: HOSPADM

## 2021-03-15 RX ORDER — PROPOFOL 10 MG/ML
VIAL (ML) INTRAVENOUS AS NEEDED
Status: DISCONTINUED | OUTPATIENT
Start: 2021-03-15 | End: 2021-03-15 | Stop reason: SURG

## 2021-03-15 RX ORDER — LIDOCAINE HYDROCHLORIDE 10 MG/ML
0.5 INJECTION, SOLUTION INFILTRATION; PERINEURAL ONCE AS NEEDED
Status: DISCONTINUED | OUTPATIENT
Start: 2021-03-15 | End: 2021-03-15 | Stop reason: HOSPADM

## 2021-03-15 RX ORDER — SODIUM CHLORIDE 0.9 % (FLUSH) 0.9 %
10 SYRINGE (ML) INJECTION AS NEEDED
Status: DISCONTINUED | OUTPATIENT
Start: 2021-03-15 | End: 2021-03-15 | Stop reason: HOSPADM

## 2021-03-15 RX ORDER — LIDOCAINE HYDROCHLORIDE 20 MG/ML
INJECTION, SOLUTION INFILTRATION; PERINEURAL AS NEEDED
Status: DISCONTINUED | OUTPATIENT
Start: 2021-03-15 | End: 2021-03-15 | Stop reason: SURG

## 2021-03-15 RX ORDER — SODIUM CHLORIDE, SODIUM LACTATE, POTASSIUM CHLORIDE, CALCIUM CHLORIDE 600; 310; 30; 20 MG/100ML; MG/100ML; MG/100ML; MG/100ML
1000 INJECTION, SOLUTION INTRAVENOUS CONTINUOUS
Status: DISCONTINUED | OUTPATIENT
Start: 2021-03-15 | End: 2021-03-15 | Stop reason: HOSPADM

## 2021-03-15 RX ADMIN — SODIUM CHLORIDE, POTASSIUM CHLORIDE, SODIUM LACTATE AND CALCIUM CHLORIDE 30 ML/HR: 600; 310; 30; 20 INJECTION, SOLUTION INTRAVENOUS at 14:55

## 2021-03-15 RX ADMIN — PROPOFOL 100 MCG/KG/MIN: 10 INJECTION, EMULSION INTRAVENOUS at 15:41

## 2021-03-15 RX ADMIN — PROPOFOL 100 MG: 10 INJECTION, EMULSION INTRAVENOUS at 15:40

## 2021-03-15 RX ADMIN — LIDOCAINE HYDROCHLORIDE 100 MG: 20 INJECTION, SOLUTION INFILTRATION; PERINEURAL at 15:40

## 2021-03-15 NOTE — ANESTHESIA POSTPROCEDURE EVALUATION
"Patient: Teagan Cates    Procedure Summary     Date: 03/15/21 Room / Location:  [facility] /  [facility]    Anesthesia Start: 1535 Anesthesia Stop: 1602    Procedure: ESOPHAGOGASTRODUODENOSCOPY WITH COLD BX'S (N/A Esophagus) Diagnosis:       Gastroesophageal reflux disease without esophagitis      Hiatal hernia      (Gastroesophageal reflux disease without esophagitis [K21.9])      (Hiatal hernia [K44.9])    Surgeons: Julianne White MD Provider: Nishant Goncalves MD    Anesthesia Type: MAC ASA Status: 2          Anesthesia Type: MAC    Vitals  No vitals data found for the desired time range.          Post Anesthesia Care and Evaluation    Patient location during evaluation: bedside  Patient participation: complete - patient participated  Level of consciousness: sleepy but conscious  Pain score: 0  Pain management: adequate  Airway patency: patent  Anesthetic complications: No anesthetic complications    Cardiovascular status: acceptable  Respiratory status: acceptable  Hydration status: acceptable    Comments: /78 (BP Location: Left arm, Patient Position: Sitting)   Pulse 88   Temp 36.6 °C (97.8 °F) (Oral)   Resp 16   Ht 165.1 cm (65\")   Wt 68.9 kg (152 lb)   SpO2 95%   BMI 25.29 kg/m²         "

## 2021-03-15 NOTE — ANESTHESIA PREPROCEDURE EVALUATION
Anesthesia Evaluation     Patient summary reviewed and Nursing notes reviewed   NPO Solid Status: > 8 hours  NPO Liquid Status: > 4 hours           Airway   Mallampati: II  Neck ROM: full  Possible difficult intubation  Dental - normal exam     Pulmonary     breath sounds clear to auscultation  Cardiovascular     Rhythm: regular    (+) hypertension, valvular problems/murmurs murmur, hyperlipidemia,       Neuro/Psych  (+) psychiatric history Anxiety,     GI/Hepatic/Renal/Endo    (+)  hiatal hernia, GERD,      Musculoskeletal     Abdominal    Substance History      OB/GYN          Other                        Anesthesia Plan    ASA 2     MAC     intravenous induction     Anesthetic plan, all risks, benefits, and alternatives have been provided, discussed and informed consent has been obtained with: patient.

## 2021-03-17 LAB
LAB AP CASE REPORT: NORMAL
PATH REPORT.FINAL DX SPEC: NORMAL
PATH REPORT.GROSS SPEC: NORMAL

## 2021-03-18 NOTE — PROGRESS NOTES
Biopsies from her EGD show mild inflammation, fundic gland polyps (benign), small segment of nondysplastic Pedro's esophagusContinue omeprazole every day--this will be a long-term medication for the Pedro's esophagusPlease place in 3-year EGD recall    Hiatal hernia actually a bit smaller appearing than previous

## 2021-03-25 DIAGNOSIS — E78.2 MIXED HYPERLIPIDEMIA: ICD-10-CM

## 2021-03-25 DIAGNOSIS — R73.9 ELEVATED BLOOD SUGAR: ICD-10-CM

## 2021-03-28 DIAGNOSIS — R12 HEARTBURN: ICD-10-CM

## 2021-03-28 DIAGNOSIS — K21.9 GASTROESOPHAGEAL REFLUX DISEASE WITHOUT ESOPHAGITIS: ICD-10-CM

## 2021-03-28 DIAGNOSIS — K44.9 HIATAL HERNIA: ICD-10-CM

## 2021-03-29 RX ORDER — PANTOPRAZOLE SODIUM 40 MG/1
TABLET, DELAYED RELEASE ORAL
Qty: 90 TABLET | Refills: 0 | OUTPATIENT
Start: 2021-03-29

## 2021-04-01 DIAGNOSIS — E78.2 MIXED HYPERLIPIDEMIA: ICD-10-CM

## 2021-04-01 RX ORDER — ROSUVASTATIN CALCIUM 20 MG/1
TABLET, COATED ORAL
Qty: 90 TABLET | Refills: 1 | Status: SHIPPED | OUTPATIENT
Start: 2021-04-01 | End: 2021-10-20

## 2021-04-08 ENCOUNTER — OFFICE VISIT (OUTPATIENT)
Dept: FAMILY MEDICINE CLINIC | Facility: CLINIC | Age: 71
End: 2021-04-08

## 2021-04-08 VITALS
TEMPERATURE: 97.5 F | HEART RATE: 90 BPM | RESPIRATION RATE: 16 BRPM | BODY MASS INDEX: 25.56 KG/M2 | DIASTOLIC BLOOD PRESSURE: 78 MMHG | OXYGEN SATURATION: 100 % | HEIGHT: 65 IN | WEIGHT: 153.4 LBS | SYSTOLIC BLOOD PRESSURE: 120 MMHG

## 2021-04-08 DIAGNOSIS — I10 BENIGN ESSENTIAL HTN: Primary | ICD-10-CM

## 2021-04-08 DIAGNOSIS — E78.2 MIXED HYPERLIPIDEMIA: ICD-10-CM

## 2021-04-08 DIAGNOSIS — F41.1 ANXIETY, GENERALIZED: ICD-10-CM

## 2021-04-08 PROBLEM — M65.4 TENOSYNOVITIS, DE QUERVAIN: Status: ACTIVE | Noted: 2021-04-08

## 2021-04-08 PROBLEM — K22.70 BARRETT'S ESOPHAGUS WITHOUT DYSPLASIA: Status: ACTIVE | Noted: 2021-04-08

## 2021-04-08 PROCEDURE — 99213 OFFICE O/P EST LOW 20 MIN: CPT | Performed by: NURSE PRACTITIONER

## 2021-04-08 NOTE — PROGRESS NOTES
Subjective     Teagan Cates is a 71 y.o.. female.     Pt here today for HTN and HLD follow up. Pt having labs recently done as well. Pt denies checking her b/p at home. Pt stating she is taking all medications and denies any issues/concerns with them. Pt recently had repair of Hiatal Hernia and 1st Covid19 vaccine at end of March. Pt c/o right thumb pain d/t DeQuervain's disease.      The following portions of the patient's history were reviewed and updated as appropriate: allergies, current medications, past family history, past medical history, past social history, past surgical history and problem list.    Past Medical History:   Diagnosis Date   • Anxiety    • GERD (gastroesophageal reflux disease)    • Hyperlipidemia    • Hypertension        Past Surgical History:   Procedure Laterality Date   • ABDOMINOPLASTY     • CATARACT EXTRACTION W/ INTRAOCULAR LENS IMPLANT Bilateral    • CHOLECYSTECTOMY     • COLONOSCOPY  02/2016    normal per pt    • ENDOSCOPY N/A 5/2/2017    large HH, gastritis, multiple gastric polyps, nodule in duodenum   • ENDOSCOPY N/A 3/15/2021    Procedure: ESOPHAGOGASTRODUODENOSCOPY WITH COLD BX'S;  Surgeon: Julianne White MD;  Location: [facility];  Service: Gastroenterology;  Laterality: N/A;  pre: hx hiatal hernia, GERD  post: hiatal hernia, gastritis, gastric plyps, lymphangiectasia.    • HYSTERECTOMY     • MOHS SURGERY  2017   • TUBAL ABDOMINAL LIGATION         Review of Systems   Constitutional: Negative.    HENT: Negative.    Respiratory: Negative.  Negative for cough and shortness of breath.    Cardiovascular: Negative.  Negative for chest pain and palpitations.   Gastrointestinal: Negative.    Genitourinary: Negative.    Musculoskeletal:        Right thumb dequervain flair up   Neurological: Negative.    Psychiatric/Behavioral: Negative.  Negative for self-injury, sleep disturbance and suicidal ideas. The patient is not nervous/anxious.        Allergies   Allergen Reactions  "  • Sulfa Antibiotics Anaphylaxis     anaphylaxis   • Iodinated Diagnostic Agents Rash       Objective     Vitals:    04/08/21 0803   BP: 120/78   Pulse: 90   Resp: 16   Temp: 97.5 °F (36.4 °C)   TempSrc: Oral   SpO2: 100%   Weight: 69.6 kg (153 lb 6.4 oz)   Height: 165.1 cm (65\")     Body mass index is 25.53 kg/m².    Physical Exam  Vitals reviewed.   HENT:      Head: Normocephalic.   Eyes:      Pupils: Pupils are equal, round, and reactive to light.   Neck:      Vascular: No carotid bruit.   Cardiovascular:      Rate and Rhythm: Normal rate and regular rhythm.      Pulses: Normal pulses.   Pulmonary:      Effort: Pulmonary effort is normal.      Breath sounds: Normal breath sounds.   Musculoskeletal:      Right lower leg: No edema.      Left lower leg: No edema.      Comments: Right thumb and wrist: full ROM, no swelling noted, no redness noted, discomfort noted with movement   Skin:     General: Skin is warm and dry.   Neurological:      Mental Status: She is alert and oriented to person, place, and time.   Psychiatric:         Behavior: Behavior normal.           Current Outpatient Medications:   •  candesartan (ATACAND) 16 MG tablet, TAKE 1 TABLET BY MOUTH EVERY DAY, Disp: 90 tablet, Rfl: 1  •  hydroCHLOROthiazide (HYDRODIURIL) 12.5 MG tablet, Take 1 tablet by mouth Daily., Disp: 90 tablet, Rfl: 3  •  omeprazole (priLOSEC) 40 MG capsule, Take 1 capsule by mouth Every Morning Before Breakfast., Disp: 30 capsule, Rfl: 2  •  rosuvastatin (CRESTOR) 20 MG tablet, TAKE 1 TABLET BY MOUTH EVERY DAY, Disp: 90 tablet, Rfl: 1  •  venlafaxine XR (EFFEXOR-XR) 150 MG 24 hr capsule, TAKE 1 CAPSULE BY MOUTH EVERY DAY, Disp: 90 capsule, Rfl: 1    Recent Results (from the past 168 hour(s))   CBC & Differential    Collection Time: 04/05/21  8:21 AM    Specimen: Blood   Result Value Ref Range    WBC 8.59 3.40 - 10.80 10*3/mm3    RBC 4.53 3.77 - 5.28 10*6/mm3    Hemoglobin 14.2 12.0 - 15.9 g/dL    Hematocrit 40.5 34.0 - 46.6 %    " MCV 89.4 79.0 - 97.0 fL    MCH 31.3 26.6 - 33.0 pg    MCHC 35.1 31.5 - 35.7 g/dL    RDW 12.1 (L) 12.3 - 15.4 %    Platelets 379 140 - 450 10*3/mm3    Neutrophil Rel % 56.8 42.7 - 76.0 %    Lymphocyte Rel % 32.1 19.6 - 45.3 %    Monocyte Rel % 6.6 5.0 - 12.0 %    Eosinophil Rel % 3.4 0.3 - 6.2 %    Basophil Rel % 0.8 0.0 - 1.5 %    Neutrophils Absolute 4.87 1.70 - 7.00 10*3/mm3    Lymphocytes Absolute 2.76 0.70 - 3.10 10*3/mm3    Monocytes Absolute 0.57 0.10 - 0.90 10*3/mm3    Eosinophils Absolute 0.29 0.00 - 0.40 10*3/mm3    Basophils Absolute 0.07 0.00 - 0.20 10*3/mm3    Immature Granulocyte Rel % 0.3 0.0 - 0.5 %    Immature Grans Absolute 0.03 0.00 - 0.05 10*3/mm3    nRBC 0.0 0.0 - 0.2 /100 WBC   Comprehensive Metabolic Panel    Collection Time: 04/05/21  8:21 AM    Specimen: Blood   Result Value Ref Range    Glucose 107 (H) 65 - 99 mg/dL    BUN 13 8 - 23 mg/dL    Creatinine 0.71 0.57 - 1.00 mg/dL    eGFR Non African Am 81 >60 mL/min/1.73    eGFR African Am 99 >60 mL/min/1.73    BUN/Creatinine Ratio 18.3 7.0 - 25.0    Sodium 141 136 - 145 mmol/L    Potassium 4.4 3.5 - 5.2 mmol/L    Chloride 99 98 - 107 mmol/L    Total CO2 30.3 (H) 22.0 - 29.0 mmol/L    Calcium 10.1 8.6 - 10.5 mg/dL    Total Protein 7.3 6.0 - 8.5 g/dL    Albumin 4.80 3.50 - 5.20 g/dL    Globulin 2.5 gm/dL    A/G Ratio 1.9 g/dL    Total Bilirubin 0.5 0.0 - 1.2 mg/dL    Alkaline Phosphatase 54 39 - 117 U/L    AST (SGOT) 26 1 - 32 U/L    ALT (SGPT) 28 1 - 33 U/L   Lipid Panel With LDL / HDL Ratio    Collection Time: 04/05/21  8:21 AM    Specimen: Blood   Result Value Ref Range    Total Cholesterol 179 0 - 200 mg/dL    Triglycerides 160 (H) 0 - 150 mg/dL    HDL Cholesterol 75 (H) 40 - 60 mg/dL    VLDL Cholesterol Joni 27 5 - 40 mg/dL    LDL Chol Calc (NIH) 77 0 - 100 mg/dL    LDL/HDL RATIO 0.96    Hemoglobin A1c    Collection Time: 04/05/21  8:21 AM    Specimen: Blood   Result Value Ref Range    Hemoglobin A1C 5.90 (H) 4.80 - 5.60 %        Assessment/Plan   Diagnoses and all orders for this visit:    1. Benign essential HTN (Primary)    2. Mixed hyperlipidemia    3. Anxiety, generalized        Patient Instructions   HTN: stable, continue atacand 16 mg daily and HCTZ 12.5 mg daily    HLD: Total cholesterol, HDL and LDL levels wnl, Trig. Elevated; discussed monitor diet closer and avoiding sugary/sweets, white bread/white rice/white potatoes; continue crestor 20 mg daily    Anxiety: denies any issues at this time; continue Effexor  mg daily      Return for 6 month follow up for HTN/Anxiety.

## 2021-04-08 NOTE — PATIENT INSTRUCTIONS
HTN: stable, continue atacand 16 mg daily and HCTZ 12.5 mg daily    HLD: Total cholesterol, HDL and LDL levels wnl, Trig. Elevated; discussed monitor diet closer and avoiding sugary/sweets, white bread/white rice/white potatoes; continue crestor 20 mg daily    Anxiety: denies any issues at this time; continue Effexor  mg daily

## 2021-04-12 ENCOUNTER — APPOINTMENT (OUTPATIENT)
Dept: ULTRASOUND IMAGING | Facility: HOSPITAL | Age: 71
End: 2021-04-12

## 2021-04-12 ENCOUNTER — APPOINTMENT (OUTPATIENT)
Dept: CARDIOLOGY | Facility: HOSPITAL | Age: 71
End: 2021-04-12

## 2021-04-14 ENCOUNTER — TELEPHONE (OUTPATIENT)
Dept: GASTROENTEROLOGY | Facility: CLINIC | Age: 71
End: 2021-04-14

## 2021-04-14 NOTE — TELEPHONE ENCOUNTER
----- Message from Julianne White MD sent at 3/18/2021  2:32 PM EDT -----  Biopsies from her EGD show mild inflammation, fundic gland polyps (benign), small segment of nondysplastic Pedro's esophagusContinue omeprazole every day--this will be a long-term medication for the Pedro's esophagusPlease place in 3-year EGD recall    Hiatal hernia actually a bit smaller appearing than previous

## 2021-05-18 RX ORDER — OMEPRAZOLE 40 MG/1
CAPSULE, DELAYED RELEASE ORAL
Qty: 90 CAPSULE | Refills: 1 | Status: SHIPPED | OUTPATIENT
Start: 2021-05-18 | End: 2021-11-12

## 2021-05-19 RX ORDER — CANDESARTAN 16 MG/1
TABLET ORAL
Qty: 90 TABLET | Refills: 1 | Status: SHIPPED | OUTPATIENT
Start: 2021-05-19 | End: 2021-11-10

## 2021-06-07 ENCOUNTER — APPOINTMENT (OUTPATIENT)
Dept: CARDIOLOGY | Facility: HOSPITAL | Age: 71
End: 2021-06-07

## 2021-06-07 ENCOUNTER — APPOINTMENT (OUTPATIENT)
Dept: ULTRASOUND IMAGING | Facility: HOSPITAL | Age: 71
End: 2021-06-07

## 2021-06-18 RX ORDER — VENLAFAXINE HYDROCHLORIDE 150 MG/1
CAPSULE, EXTENDED RELEASE ORAL
Qty: 90 CAPSULE | Refills: 1 | Status: SHIPPED | OUTPATIENT
Start: 2021-06-18 | End: 2021-11-22

## 2021-07-09 ENCOUNTER — HOSPITAL ENCOUNTER (OUTPATIENT)
Dept: CARDIOLOGY | Facility: HOSPITAL | Age: 71
Discharge: HOME | End: 2021-07-09

## 2021-07-09 ENCOUNTER — HOSPITAL ENCOUNTER (OUTPATIENT)
Dept: ULTRASOUND IMAGING | Facility: HOSPITAL | Age: 71
Discharge: HOME | End: 2021-07-09
Admitting: INTERNAL MEDICINE

## 2021-07-09 VITALS
BODY MASS INDEX: 25.33 KG/M2 | HEIGHT: 65 IN | DIASTOLIC BLOOD PRESSURE: 73 MMHG | HEART RATE: 89 BPM | WEIGHT: 152 LBS | SYSTOLIC BLOOD PRESSURE: 144 MMHG

## 2021-07-09 DIAGNOSIS — R74.01 ELEVATED TRANSAMINASE LEVEL: ICD-10-CM

## 2021-07-09 DIAGNOSIS — Z13.6 ENCOUNTER FOR SCREENING FOR VASCULAR DISEASE: ICD-10-CM

## 2021-07-09 LAB
BH CV ECHO MEAS - DIST AO DIAM: 1.41 CM
BH CV VAS BP LEFT ARM: NORMAL MMHG
BH CV VAS BP RIGHT ARM: NORMAL MMHG
BH CV XLRA MEAS - MID AO DIAM: 1.68 CM
BH CV XLRA MEAS - PAD LEFT ABI DP: 1.08
BH CV XLRA MEAS - PAD LEFT ABI PT: 1.11
BH CV XLRA MEAS - PAD LEFT ARM: 138 MMHG
BH CV XLRA MEAS - PAD LEFT LEG DP: 156 MMHG
BH CV XLRA MEAS - PAD LEFT LEG PT: 160 MMHG
BH CV XLRA MEAS - PAD RIGHT ABI DP: 1.15
BH CV XLRA MEAS - PAD RIGHT ABI PT: 1.15
BH CV XLRA MEAS - PAD RIGHT ARM: 144 MMHG
BH CV XLRA MEAS - PAD RIGHT LEG DP: 166 MMHG
BH CV XLRA MEAS - PAD RIGHT LEG PT: 166 MMHG
BH CV XLRA MEAS - PROX AO DIAM: 1.89 CM
BH CV XLRA MEAS LEFT ICA/CCA RATIO: 0.75
BH CV XLRA MEAS LEFT MID CCA PSV: NORMAL CM/SEC
BH CV XLRA MEAS LEFT MID ICA PSV: NORMAL CM/SEC
BH CV XLRA MEAS LEFT PROX ECA PSV: NORMAL CM/SEC
BH CV XLRA MEAS RIGHT ICA/CCA RATIO: 0.6
BH CV XLRA MEAS RIGHT MID CCA PSV: NORMAL CM/SEC
BH CV XLRA MEAS RIGHT MID ICA PSV: NORMAL CM/SEC
BH CV XLRA MEAS RIGHT PROX ECA PSV: NORMAL CM/SEC

## 2021-07-09 PROCEDURE — 76705 ECHO EXAM OF ABDOMEN: CPT

## 2021-07-09 PROCEDURE — 93799 UNLISTED CV SVC/PROCEDURE: CPT

## 2021-07-09 NOTE — PROGRESS NOTES
Liver ultrasound shows diffuse hepatic steatosis.    I recommend decreased alcohol intake  Healthy lifestyle changes, goal of 10 pound weight loss over the next year.

## 2021-07-14 ENCOUNTER — TELEPHONE (OUTPATIENT)
Dept: GASTROENTEROLOGY | Facility: CLINIC | Age: 71
End: 2021-07-14

## 2021-07-14 NOTE — TELEPHONE ENCOUNTER
----- Message from Julianne White MD sent at 7/9/2021  4:45 PM EDT -----  Liver ultrasound shows diffuse hepatic steatosis.    I recommend decreased alcohol intake  Healthy lifestyle changes, goal of 10 pound weight loss over the next year.

## 2021-07-23 NOTE — TELEPHONE ENCOUNTER
Sally Ortiz RegSched Rep  [facility] [address] [place]  Phone Number: [phone]   Pt is returning your call, states you may leave results on her VM       Called pt and advised of Dr White note. Verb understanding.

## 2021-08-31 RX ORDER — HYDROCHLOROTHIAZIDE 12.5 MG/1
TABLET ORAL
Qty: 90 TABLET | Refills: 3 | Status: SHIPPED | OUTPATIENT
Start: 2021-08-31 | End: 2022-09-06

## 2021-10-15 DIAGNOSIS — E78.2 MIXED HYPERLIPIDEMIA: Primary | ICD-10-CM

## 2021-10-16 LAB
ALBUMIN SERPL-MCNC: 4.7 G/DL (ref 3.5–5.2)
ALBUMIN/GLOB SERPL: 2 G/DL
ALP SERPL-CCNC: 58 U/L (ref 39–117)
ALT SERPL-CCNC: 41 U/L (ref 1–33)
AST SERPL-CCNC: 29 U/L (ref 1–32)
BASOPHILS # BLD AUTO: 0.06 10*3/MM3 (ref 0–0.2)
BASOPHILS NFR BLD AUTO: 0.8 % (ref 0–1.5)
BILIRUB SERPL-MCNC: 0.4 MG/DL (ref 0–1.2)
BUN SERPL-MCNC: 14 MG/DL (ref 8–23)
BUN/CREAT SERPL: 21.2 (ref 7–25)
CALCIUM SERPL-MCNC: 10 MG/DL (ref 8.6–10.5)
CHLORIDE SERPL-SCNC: 102 MMOL/L (ref 98–107)
CHOLEST SERPL-MCNC: 169 MG/DL (ref 0–200)
CO2 SERPL-SCNC: 28.7 MMOL/L (ref 22–29)
CREAT SERPL-MCNC: 0.66 MG/DL (ref 0.57–1)
EOSINOPHIL # BLD AUTO: 0.25 10*3/MM3 (ref 0–0.4)
EOSINOPHIL NFR BLD AUTO: 3.2 % (ref 0.3–6.2)
ERYTHROCYTE [DISTWIDTH] IN BLOOD BY AUTOMATED COUNT: 12.8 % (ref 12.3–15.4)
GLOBULIN SER CALC-MCNC: 2.3 GM/DL
GLUCOSE SERPL-MCNC: 109 MG/DL (ref 65–99)
HCT VFR BLD AUTO: 41.9 % (ref 34–46.6)
HDLC SERPL-MCNC: 63 MG/DL (ref 40–60)
HGB BLD-MCNC: 13.7 G/DL (ref 12–15.9)
IMM GRANULOCYTES # BLD AUTO: 0.02 10*3/MM3 (ref 0–0.05)
IMM GRANULOCYTES NFR BLD AUTO: 0.3 % (ref 0–0.5)
LDLC SERPL CALC-MCNC: 85 MG/DL (ref 0–100)
LDLC/HDLC SERPL: 1.3 {RATIO}
LYMPHOCYTES # BLD AUTO: 2.55 10*3/MM3 (ref 0.7–3.1)
LYMPHOCYTES NFR BLD AUTO: 32.5 % (ref 19.6–45.3)
MCH RBC QN AUTO: 30.8 PG (ref 26.6–33)
MCHC RBC AUTO-ENTMCNC: 32.7 G/DL (ref 31.5–35.7)
MCV RBC AUTO: 94.2 FL (ref 79–97)
MONOCYTES # BLD AUTO: 0.56 10*3/MM3 (ref 0.1–0.9)
MONOCYTES NFR BLD AUTO: 7.1 % (ref 5–12)
NEUTROPHILS # BLD AUTO: 4.41 10*3/MM3 (ref 1.7–7)
NEUTROPHILS NFR BLD AUTO: 56.1 % (ref 42.7–76)
NRBC BLD AUTO-RTO: 0 /100 WBC (ref 0–0.2)
PLATELET # BLD AUTO: 347 10*3/MM3 (ref 140–450)
POTASSIUM SERPL-SCNC: 3.9 MMOL/L (ref 3.5–5.2)
PROT SERPL-MCNC: 7 G/DL (ref 6–8.5)
RBC # BLD AUTO: 4.45 10*6/MM3 (ref 3.77–5.28)
SODIUM SERPL-SCNC: 141 MMOL/L (ref 136–145)
TRIGL SERPL-MCNC: 122 MG/DL (ref 0–150)
VLDLC SERPL CALC-MCNC: 21 MG/DL (ref 5–40)
WBC # BLD AUTO: 7.85 10*3/MM3 (ref 3.4–10.8)

## 2021-10-20 DIAGNOSIS — E78.2 MIXED HYPERLIPIDEMIA: ICD-10-CM

## 2021-10-20 RX ORDER — ROSUVASTATIN CALCIUM 20 MG/1
TABLET, COATED ORAL
Qty: 90 TABLET | Refills: 1 | Status: SHIPPED | OUTPATIENT
Start: 2021-10-20 | End: 2022-04-21

## 2021-10-20 NOTE — TELEPHONE ENCOUNTER
Rx Refill Note  Requested Prescriptions     Pending Prescriptions Disp Refills   • rosuvastatin (CRESTOR) 20 MG tablet [Pharmacy Med Name: ROSUVASTATIN CALCIUM 20 MG TAB] 90 tablet 1     Sig: TAKE 1 TABLET BY MOUTH EVERY DAY      Last office visit with prescribing clinician: 4/8/2021      Next office visit with prescribing clinician: 10/22/2021            Gina Horner MA  10/20/21, 08:40 EDT

## 2021-10-22 ENCOUNTER — OFFICE VISIT (OUTPATIENT)
Dept: FAMILY MEDICINE CLINIC | Facility: CLINIC | Age: 71
End: 2021-10-22

## 2021-10-22 VITALS
DIASTOLIC BLOOD PRESSURE: 82 MMHG | WEIGHT: 154.2 LBS | TEMPERATURE: 97.1 F | HEART RATE: 95 BPM | OXYGEN SATURATION: 96 % | SYSTOLIC BLOOD PRESSURE: 128 MMHG | BODY MASS INDEX: 25.69 KG/M2 | RESPIRATION RATE: 18 BRPM | HEIGHT: 65 IN

## 2021-10-22 DIAGNOSIS — F41.1 ANXIETY, GENERALIZED: ICD-10-CM

## 2021-10-22 DIAGNOSIS — I10 BENIGN ESSENTIAL HTN: Primary | ICD-10-CM

## 2021-10-22 DIAGNOSIS — E78.2 MIXED HYPERLIPIDEMIA: ICD-10-CM

## 2021-10-22 PROCEDURE — 99213 OFFICE O/P EST LOW 20 MIN: CPT | Performed by: NURSE PRACTITIONER

## 2021-10-22 NOTE — PATIENT INSTRUCTIONS
Discussed all recent labs in office today    HTN: stable, continue candesartan 16 mg 1 tab daily    HLD: stable, continue rosuvastatin 20 mg daily; continue eating a heart healthy diet, drinking plenty of water and increase exercise to 3-5 times a week for more than 30 minutes at a time.     Anxiety: stable, continue venlafaxine 150 mg daily

## 2021-10-22 NOTE — PROGRESS NOTES
Subjective     Teagan Cates is a 71 y.o.. female.     Pt here today for follow up on HTN and HLD. Pt stating she occasionally checks her b/p with averages around 120's systolic. Pt stating she is eating healthy, drinking plenty of water and exercising by walking twice a week. Pt denies any issues/concerns this am.      The following portions of the patient's history were reviewed and updated as appropriate: allergies, current medications, past family history, past medical history, past social history, past surgical history and problem list.    Past Medical History:   Diagnosis Date   • Anxiety    • GERD (gastroesophageal reflux disease)    • Hyperlipidemia    • Hypertension        Past Surgical History:   Procedure Laterality Date   • ABDOMINOPLASTY     • CATARACT EXTRACTION W/ INTRAOCULAR LENS IMPLANT Bilateral    • CHOLECYSTECTOMY     • COLONOSCOPY  02/2016    normal per pt    • ENDOSCOPY N/A 5/2/2017    large HH, gastritis, multiple gastric polyps, nodule in duodenum   • ENDOSCOPY N/A 3/15/2021    Procedure: ESOPHAGOGASTRODUODENOSCOPY WITH COLD BX'S;  Surgeon: Julianne White MD;  Location: [facility] ENDOSCOPY;  Service: Gastroenterology;  Laterality: N/A;  pre: hx hiatal hernia, GERD  post: hiatal hernia, gastritis, gastric plyps, lymphangiectasia.    • HYSTERECTOMY     • MOHS SURGERY  2017   • TUBAL ABDOMINAL LIGATION         Review of Systems   Constitutional: Negative.    Respiratory: Negative.    Cardiovascular: Negative.    Psychiatric/Behavioral: Positive for sleep disturbance (minor issues; does not want medication). Negative for decreased concentration. The patient is not nervous/anxious.        Allergies   Allergen Reactions   • Sulfa Antibiotics Anaphylaxis     anaphylaxis   • Iodinated Diagnostic Agents Rash       Objective     Vitals:    10/22/21 0804   BP: 128/82   Pulse: 95   Resp: 18   Temp: 97.1 °F (36.2 °C)   TempSrc: Temporal   SpO2: 96%   Weight: 69.9 kg (154 lb 3.2 oz)   Height: 165.1 cm  "(65\")     Body mass index is 25.66 kg/m².    Physical Exam  Vitals reviewed.   HENT:      Head: Normocephalic.   Eyes:      Pupils: Pupils are equal, round, and reactive to light.   Neck:      Vascular: No carotid bruit.   Cardiovascular:      Rate and Rhythm: Normal rate and regular rhythm.   Pulmonary:      Effort: Pulmonary effort is normal.      Breath sounds: Normal breath sounds.   Musculoskeletal:         General: Normal range of motion.      Right lower leg: No edema.      Left lower leg: No edema.   Neurological:      Mental Status: She is alert and oriented to person, place, and time.      Cranial Nerves: No dysarthria or facial asymmetry.      Motor: Motor function is intact.      Gait: Gait normal.   Psychiatric:         Mood and Affect: Mood normal.         Speech: Speech normal.         Behavior: Behavior normal.           Current Outpatient Medications:   •  candesartan (ATACAND) 16 MG tablet, TAKE 1 TABLET BY MOUTH EVERY DAY, Disp: 90 tablet, Rfl: 1  •  hydroCHLOROthiazide (HYDRODIURIL) 12.5 MG tablet, TAKE 1 TABLET BY MOUTH EVERY DAY, Disp: 90 tablet, Rfl: 3  •  omeprazole (priLOSEC) 40 MG capsule, TAKE 1 CAPSULE BY MOUTH EVERY DAY IN THE MORNING BEFORE BREAKFAST, Disp: 90 capsule, Rfl: 1  •  rosuvastatin (CRESTOR) 20 MG tablet, TAKE 1 TABLET BY MOUTH EVERY DAY, Disp: 90 tablet, Rfl: 1  •  venlafaxine XR (EFFEXOR-XR) 150 MG 24 hr capsule, TAKE 1 CAPSULE BY MOUTH EVERY DAY, Disp: 90 capsule, Rfl: 1    Recent Results (from the past 2016 hour(s))   CBC & Differential    Collection Time: 10/15/21 10:32 AM    Specimen: Blood   Result Value Ref Range    WBC 7.85 3.40 - 10.80 10*3/mm3    RBC 4.45 3.77 - 5.28 10*6/mm3    Hemoglobin 13.7 12.0 - 15.9 g/dL    Hematocrit 41.9 34.0 - 46.6 %    MCV 94.2 79.0 - 97.0 fL    MCH 30.8 26.6 - 33.0 pg    MCHC 32.7 31.5 - 35.7 g/dL    RDW 12.8 12.3 - 15.4 %    Platelets 347 140 - 450 10*3/mm3    Neutrophil Rel % 56.1 42.7 - 76.0 %    Lymphocyte Rel % 32.5 19.6 - 45.3 % "    Monocyte Rel % 7.1 5.0 - 12.0 %    Eosinophil Rel % 3.2 0.3 - 6.2 %    Basophil Rel % 0.8 0.0 - 1.5 %    Neutrophils Absolute 4.41 1.70 - 7.00 10*3/mm3    Lymphocytes Absolute 2.55 0.70 - 3.10 10*3/mm3    Monocytes Absolute 0.56 0.10 - 0.90 10*3/mm3    Eosinophils Absolute 0.25 0.00 - 0.40 10*3/mm3    Basophils Absolute 0.06 0.00 - 0.20 10*3/mm3    Immature Granulocyte Rel % 0.3 0.0 - 0.5 %    Immature Grans Absolute 0.02 0.00 - 0.05 10*3/mm3    nRBC 0.0 0.0 - 0.2 /100 WBC   Comprehensive Metabolic Panel    Collection Time: 10/15/21 10:32 AM    Specimen: Blood   Result Value Ref Range    Glucose 109 (H) 65 - 99 mg/dL    BUN 14 8 - 23 mg/dL    Creatinine 0.66 0.57 - 1.00 mg/dL    eGFR Non African Am 89 >60 mL/min/1.73    eGFR African Am 107 >60 mL/min/1.73    BUN/Creatinine Ratio 21.2 7.0 - 25.0    Sodium 141 136 - 145 mmol/L    Potassium 3.9 3.5 - 5.2 mmol/L    Chloride 102 98 - 107 mmol/L    Total CO2 28.7 22.0 - 29.0 mmol/L    Calcium 10.0 8.6 - 10.5 mg/dL    Total Protein 7.0 6.0 - 8.5 g/dL    Albumin 4.70 3.50 - 5.20 g/dL    Globulin 2.3 gm/dL    A/G Ratio 2.0 g/dL    Total Bilirubin 0.4 0.0 - 1.2 mg/dL    Alkaline Phosphatase 58 39 - 117 U/L    AST (SGOT) 29 1 - 32 U/L    ALT (SGPT) 41 (H) 1 - 33 U/L   Lipid Panel With LDL / HDL Ratio    Collection Time: 10/15/21 10:32 AM    Specimen: Blood   Result Value Ref Range    Total Cholesterol 169 0 - 200 mg/dL    Triglycerides 122 0 - 150 mg/dL    HDL Cholesterol 63 (H) 40 - 60 mg/dL    VLDL Cholesterol Joni 21 5 - 40 mg/dL    LDL Chol Calc (NIH) 85 0 - 100 mg/dL    LDL/HDL RATIO 1.30        US Liver  Narrative: Examination: Liver sonogram     HISTORY: 71-year-old female with a history of elevated liver enzymes     FINDINGS: Sonographic evaluation of the liver and selected structures of  the right upper quadrant was performed. No prior examinations available  for comparison.  The right kidney has a normal appearance. Increased  echogenicity is seen throughout the  liver without evidence for focal  abnormality. The liver measures on the order of 17.3 cm in anterior to  posterior dimension. The portal vein is patent with appropriate  directional flow. There is no intrahepatic bile duct dilatation. The  gallbladder has been surgically removed. The common bile duct measures  0.5 cm in diameter. The visualized portion of the pancreas appears  normal.     Impression: 1. There is evidence of diffuse hepatic steatosis.  2. The patient status post cholecystectomy.     This report was finalized on 7/9/2021 1:54 PM by Dr. Teodoro Armando M.D.     Vascular screening (bundle) CAR  · Carotid Artery Disease and Stroke Screening: The right carotid artery is   normal. The left carotid artery is normal.  · Abdominal Aortic Aneurysm (AAA) Screening: Maximum proximal diameter of   1.89 cm. The artery was normal.  · Peripheral Artery Disease (P.A.D.) Screening: The right has normal   arterial pressures. The left has normal arterial pressures.         Assessment/Plan   Diagnoses and all orders for this visit:    1. Benign essential HTN (Primary)    2. Mixed hyperlipidemia    3. Anxiety, generalized        Patient Instructions   Discussed all recent labs in office today    HTN: stable, continue candesartan 16 mg 1 tab daily    HLD: stable, continue rosuvastatin 20 mg daily; continue eating a heart healthy diet, drinking plenty of water and increase exercise to 3-5 times a week for more than 30 minutes at a time.     Anxiety: stable, continue venlafaxine 150 mg daily      Return for follow up in 6 months for AWV.

## 2021-11-10 RX ORDER — CANDESARTAN 16 MG/1
TABLET ORAL
Qty: 90 TABLET | Refills: 1 | Status: SHIPPED | OUTPATIENT
Start: 2021-11-10 | End: 2022-05-16

## 2021-11-12 RX ORDER — OMEPRAZOLE 40 MG/1
CAPSULE, DELAYED RELEASE ORAL
Qty: 90 CAPSULE | Refills: 1 | Status: SHIPPED | OUTPATIENT
Start: 2021-11-12 | End: 2022-05-19 | Stop reason: SDUPTHER

## 2021-11-22 RX ORDER — VENLAFAXINE HYDROCHLORIDE 150 MG/1
CAPSULE, EXTENDED RELEASE ORAL
Qty: 90 CAPSULE | Refills: 1 | Status: SHIPPED | OUTPATIENT
Start: 2021-11-22 | End: 2022-05-31

## 2022-04-15 DIAGNOSIS — Z00.00 WELLNESS EXAMINATION: Primary | ICD-10-CM

## 2022-04-15 DIAGNOSIS — E78.2 MIXED HYPERLIPIDEMIA: ICD-10-CM

## 2022-04-15 DIAGNOSIS — R73.9 ELEVATED BLOOD SUGAR: ICD-10-CM

## 2022-04-15 DIAGNOSIS — F41.1 ANXIETY, GENERALIZED: ICD-10-CM

## 2022-04-16 LAB
ALBUMIN SERPL-MCNC: 4.4 G/DL (ref 3.7–4.7)
ALBUMIN/GLOB SERPL: 1.7 {RATIO} (ref 1.2–2.2)
ALP SERPL-CCNC: 57 IU/L (ref 44–121)
ALT SERPL-CCNC: 28 IU/L (ref 0–32)
AST SERPL-CCNC: 26 IU/L (ref 0–40)
BASOPHILS # BLD AUTO: 0.1 X10E3/UL (ref 0–0.2)
BASOPHILS NFR BLD AUTO: 1 %
BILIRUB SERPL-MCNC: 0.3 MG/DL (ref 0–1.2)
BUN SERPL-MCNC: 16 MG/DL (ref 8–27)
BUN/CREAT SERPL: 25 (ref 12–28)
CALCIUM SERPL-MCNC: 10 MG/DL (ref 8.7–10.3)
CHLORIDE SERPL-SCNC: 102 MMOL/L (ref 96–106)
CHOLEST SERPL-MCNC: 200 MG/DL (ref 100–199)
CO2 SERPL-SCNC: 24 MMOL/L (ref 20–29)
CREAT SERPL-MCNC: 0.65 MG/DL (ref 0.57–1)
EGFRCR SERPLBLD CKD-EPI 2021: 94 ML/MIN/1.73
EOSINOPHIL # BLD AUTO: 0.3 X10E3/UL (ref 0–0.4)
EOSINOPHIL NFR BLD AUTO: 4 %
ERYTHROCYTE [DISTWIDTH] IN BLOOD BY AUTOMATED COUNT: 12.4 % (ref 11.7–15.4)
GLOBULIN SER CALC-MCNC: 2.6 G/DL (ref 1.5–4.5)
GLUCOSE SERPL-MCNC: 102 MG/DL (ref 65–99)
HBA1C MFR BLD: 6 % (ref 4.8–5.6)
HCT VFR BLD AUTO: 39.4 % (ref 34–46.6)
HDLC SERPL-MCNC: 71 MG/DL
HGB BLD-MCNC: 13.4 G/DL (ref 11.1–15.9)
IMM GRANULOCYTES # BLD AUTO: 0 X10E3/UL (ref 0–0.1)
IMM GRANULOCYTES NFR BLD AUTO: 0 %
LDLC SERPL CALC-MCNC: 104 MG/DL (ref 0–99)
LDLC/HDLC SERPL: 1.5 RATIO (ref 0–3.2)
LYMPHOCYTES # BLD AUTO: 2.8 X10E3/UL (ref 0.7–3.1)
LYMPHOCYTES NFR BLD AUTO: 38 %
MCH RBC QN AUTO: 31.5 PG (ref 26.6–33)
MCHC RBC AUTO-ENTMCNC: 34 G/DL (ref 31.5–35.7)
MCV RBC AUTO: 93 FL (ref 79–97)
MONOCYTES # BLD AUTO: 0.5 X10E3/UL (ref 0.1–0.9)
MONOCYTES NFR BLD AUTO: 6 %
NEUTROPHILS # BLD AUTO: 3.8 X10E3/UL (ref 1.4–7)
NEUTROPHILS NFR BLD AUTO: 51 %
PLATELET # BLD AUTO: 345 X10E3/UL (ref 150–450)
POTASSIUM SERPL-SCNC: 4.1 MMOL/L (ref 3.5–5.2)
PROT SERPL-MCNC: 7 G/DL (ref 6–8.5)
RBC # BLD AUTO: 4.25 X10E6/UL (ref 3.77–5.28)
SODIUM SERPL-SCNC: 143 MMOL/L (ref 134–144)
TRIGL SERPL-MCNC: 146 MG/DL (ref 0–149)
VLDLC SERPL CALC-MCNC: 25 MG/DL (ref 5–40)
WBC # BLD AUTO: 7.3 X10E3/UL (ref 3.4–10.8)

## 2022-04-18 ENCOUNTER — APPOINTMENT (OUTPATIENT)
Dept: WOMENS IMAGING | Facility: HOSPITAL | Age: 72
End: 2022-04-18

## 2022-04-18 PROCEDURE — 77063 BREAST TOMOSYNTHESIS BI: CPT | Performed by: RADIOLOGY

## 2022-04-18 PROCEDURE — 77067 SCR MAMMO BI INCL CAD: CPT | Performed by: RADIOLOGY

## 2022-04-21 DIAGNOSIS — E78.2 MIXED HYPERLIPIDEMIA: ICD-10-CM

## 2022-04-21 RX ORDER — ROSUVASTATIN CALCIUM 20 MG/1
TABLET, COATED ORAL
Qty: 90 TABLET | Refills: 1 | Status: SHIPPED | OUTPATIENT
Start: 2022-04-21 | End: 2022-09-06

## 2022-04-25 ENCOUNTER — OFFICE VISIT (OUTPATIENT)
Dept: FAMILY MEDICINE CLINIC | Facility: CLINIC | Age: 72
End: 2022-04-25

## 2022-04-25 VITALS
TEMPERATURE: 96.4 F | HEART RATE: 83 BPM | SYSTOLIC BLOOD PRESSURE: 124 MMHG | WEIGHT: 155 LBS | HEIGHT: 65 IN | BODY MASS INDEX: 25.83 KG/M2 | OXYGEN SATURATION: 97 % | DIASTOLIC BLOOD PRESSURE: 70 MMHG | RESPIRATION RATE: 18 BRPM

## 2022-04-25 DIAGNOSIS — F41.1 ANXIETY, GENERALIZED: ICD-10-CM

## 2022-04-25 DIAGNOSIS — Z00.00 MEDICARE ANNUAL WELLNESS VISIT, SUBSEQUENT: Primary | ICD-10-CM

## 2022-04-25 DIAGNOSIS — E78.2 MIXED HYPERLIPIDEMIA: ICD-10-CM

## 2022-04-25 DIAGNOSIS — I10 BENIGN ESSENTIAL HTN: ICD-10-CM

## 2022-04-25 DIAGNOSIS — R73.09 ELEVATED HEMOGLOBIN A1C: ICD-10-CM

## 2022-04-25 PROCEDURE — 1159F MED LIST DOCD IN RCRD: CPT | Performed by: NURSE PRACTITIONER

## 2022-04-25 PROCEDURE — 1170F FXNL STATUS ASSESSED: CPT | Performed by: NURSE PRACTITIONER

## 2022-04-25 PROCEDURE — G0439 PPPS, SUBSEQ VISIT: HCPCS | Performed by: NURSE PRACTITIONER

## 2022-04-25 NOTE — PROGRESS NOTES
The ABCs of the Annual Wellness Visit  Subsequent Medicare Wellness Visit    Chief Complaint   Patient presents with   • Medicare Wellness-subsequent      Subjective    History of Present Illness:  Teagan Cates is a 72 y.o. female who presents for a Subsequent Medicare Wellness Visit.    The following portions of the patient's history were reviewed and   updated as appropriate: allergies, current medications, past family history, past medical history, past social history, past surgical history and problem list.    Compared to one year ago, the patient feels her physical   health is the same.    Compared to one year ago, the patient feels her mental   health is the same.    Recent Hospitalizations:  She was not admitted to the hospital during the last year.       Current Medical Providers:  Patient Care Team:  Mary Urbano APRN as PCP - General (Family Medicine)    Outpatient Medications Prior to Visit   Medication Sig Dispense Refill   • candesartan (ATACAND) 16 MG tablet TAKE 1 TABLET BY MOUTH EVERY DAY 90 tablet 1   • hydroCHLOROthiazide (HYDRODIURIL) 12.5 MG tablet TAKE 1 TABLET BY MOUTH EVERY DAY 90 tablet 3   • omeprazole (priLOSEC) 40 MG capsule TAKE 1 CAPSULE BY MOUTH EVERY DAY IN THE MORNING BEFORE BREAKFAST 90 capsule 1   • rosuvastatin (CRESTOR) 20 MG tablet TAKE 1 TABLET BY MOUTH EVERY DAY 90 tablet 1   • venlafaxine XR (EFFEXOR-XR) 150 MG 24 hr capsule TAKE 1 CAPSULE BY MOUTH EVERY DAY 90 capsule 1     No facility-administered medications prior to visit.       No opioid medication identified on active medication list. I have reviewed chart for other potential  high risk medication/s and harmful drug interactions in the elderly.          Aspirin is not on active medication list.  Aspirin use is not indicated based on review of current medical condition/s. Risk of harm outweighs potential benefits.  .    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  ACP discussion was held with the patient during this visit. Patient does not have an advance directive, information provided.    Review of Systems   Constitutional: Negative.    Respiratory: Negative.    Cardiovascular: Negative.         Objective    Vitals:    04/25/22 1339 04/25/22 1355   BP: 137/78 124/70   BP Location: Left arm    Patient Position: Sitting    Pulse: 83    Resp: 18    Temp: 96.4 °F (35.8 °C)    TempSrc: Oral    SpO2: 97%    Weight: 70.3 kg (155 lb)    Height: 165.1 cm (65\")      Body mass index is 25.79 kg/m².  BMI has not been calculated during today's encounter.     Does the patient have evidence of cognitive impairment? No    Physical Exam  Vitals reviewed.   HENT:      Head: Normocephalic.   Eyes:      Pupils: Pupils are equal, round, and reactive to light.   Cardiovascular:      Rate and Rhythm: Normal rate and regular rhythm.      Pulses: Normal pulses.   Pulmonary:      Effort: Pulmonary effort is normal.      Breath sounds: Normal breath sounds.   Musculoskeletal:         General: Normal range of motion.   Neurological:      Mental Status: She is alert and oriented to person, place, and time.   Psychiatric:         Behavior: Behavior normal.           Recent Results (from the past 672 hour(s))   CBC & Differential    Collection Time: 04/15/22  9:36 AM    Specimen: Blood   Result Value Ref Range    WBC 7.3 3.4 - 10.8 x10E3/uL    RBC 4.25 3.77 - 5.28 x10E6/uL    Hemoglobin 13.4 11.1 - 15.9 g/dL    Hematocrit 39.4 34.0 - 46.6 %    MCV 93 79 - 97 fL    MCH 31.5 26.6 - 33.0 pg    MCHC 34.0 31.5 - 35.7 g/dL    RDW 12.4 11.7 - 15.4 %    Platelets 345 150 - 450 x10E3/uL    " Neutrophil Rel % 51 Not Estab. %    Lymphocyte Rel % 38 Not Estab. %    Monocyte Rel % 6 Not Estab. %    Eosinophil Rel % 4 Not Estab. %    Basophil Rel % 1 Not Estab. %    Neutrophils Absolute 3.8 1.4 - 7.0 x10E3/uL    Lymphocytes Absolute 2.8 0.7 - 3.1 x10E3/uL    Monocytes Absolute 0.5 0.1 - 0.9 x10E3/uL    Eosinophils Absolute 0.3 0.0 - 0.4 x10E3/uL    Basophils Absolute 0.1 0.0 - 0.2 x10E3/uL    Immature Granulocyte Rel % 0 Not Estab. %    Immature Grans Absolute 0.0 0.0 - 0.1 x10E3/uL   Comprehensive Metabolic Panel    Collection Time: 04/15/22  9:36 AM    Specimen: Blood   Result Value Ref Range    Glucose 102 (H) 65 - 99 mg/dL    BUN 16 8 - 27 mg/dL    Creatinine 0.65 0.57 - 1.00 mg/dL    EGFR Result 94 >59 mL/min/1.73    BUN/Creatinine Ratio 25 12 - 28    Sodium 143 134 - 144 mmol/L    Potassium 4.1 3.5 - 5.2 mmol/L    Chloride 102 96 - 106 mmol/L    Total CO2 24 20 - 29 mmol/L    Calcium 10.0 8.7 - 10.3 mg/dL    Total Protein 7.0 6.0 - 8.5 g/dL    Albumin 4.4 3.7 - 4.7 g/dL    Globulin 2.6 1.5 - 4.5 g/dL    A/G Ratio 1.7 1.2 - 2.2    Total Bilirubin 0.3 0.0 - 1.2 mg/dL    Alkaline Phosphatase 57 44 - 121 IU/L    AST (SGOT) 26 0 - 40 IU/L    ALT (SGPT) 28 0 - 32 IU/L   Lipid Panel With LDL / HDL Ratio    Collection Time: 04/15/22  9:36 AM    Specimen: Blood   Result Value Ref Range    Total Cholesterol 200 (H) 100 - 199 mg/dL    Triglycerides 146 0 - 149 mg/dL    HDL Cholesterol 71 >39 mg/dL    VLDL Cholesterol Joni 25 5 - 40 mg/dL    LDL Chol Calc (NIH) 104 (H) 0 - 99 mg/dL    LDL/HDL RATIO 1.5 0.0 - 3.2 ratio   Hemoglobin A1c    Collection Time: 04/15/22  9:36 AM    Specimen: Blood   Result Value Ref Range    Hemoglobin A1C 6.0 (H) 4.8 - 5.6 %       HEALTH RISK ASSESSMENT    Smoking Status:  Social History     Tobacco Use   Smoking Status Never Smoker   Smokeless Tobacco Never Used     Alcohol Consumption:  Social History     Substance and Sexual Activity   Alcohol Use Yes    Comment: SOCIAL     Fall Risk  Screen:    ANU Fall Risk Assessment was completed, and patient is at LOW risk for falls.Assessment completed on:4/25/2022    Depression Screening:  PHQ-2/PHQ-9 Depression Screening 4/25/2022   Retired Total Score -   Little Interest or Pleasure in Doing Things 0-->not at all   Feeling Down, Depressed or Hopeless 0-->not at all   PHQ-9: Brief Depression Severity Measure Score 0       Health Habits and Functional and Cognitive Screening:  Functional & Cognitive Status 4/25/2022   Do you have difficulty preparing food and eating? No   Do you have difficulty bathing yourself, getting dressed or grooming yourself? No   Do you have difficulty using the toilet? No   Do you have difficulty moving around from place to place? No   Do you have trouble with steps or getting out of a bed or a chair? No   Current Diet Well Balanced Diet   Dental Exam Up to date   Eye Exam Up to date   Exercise (times per week) 0 times per week   Current Exercises Include No Regular Exercise   Current Exercise Activities Include -   Do you need help using the phone?  No   Are you deaf or do you have serious difficulty hearing?  No   Do you need help with transportation? No   Do you need help shopping? No   Do you need help preparing meals?  No   Do you need help with housework?  No   Do you need help with laundry? No   Do you need help taking your medications? No   Do you need help managing money? No   Do you ever drive or ride in a car without wearing a seat belt? No   Have you felt unusual stress, anger or loneliness in the last month? No   Who do you live with? Spouse   If you need help, do you have trouble finding someone available to you? No   Have you been bothered in the last four weeks by sexual problems? -   Do you have difficulty concentrating, remembering or making decisions? No       Age-appropriate Screening Schedule:  Refer to the list below for future screening recommendations based on patient's age, sex and/or medical  conditions. Orders for these recommended tests are listed in the plan section. The patient has been provided with a written plan.    Health Maintenance   Topic Date Due   • DXA SCAN  11/28/2020   • INFLUENZA VACCINE  08/01/2022   • MAMMOGRAM  01/06/2023   • LIPID PANEL  04/15/2023   • TDAP/TD VACCINES (3 - Td or Tdap) 09/22/2031   • ZOSTER VACCINE  Completed   • PAP SMEAR  Discontinued              Assessment/Plan   CMS Preventative Services Quick Reference  Risk Factors Identified During Encounter  Cardiovascular Disease  The above risks/problems have been discussed with the patient.  Follow up actions/plans if indicated are seen below in the Assessment/Plan Section.  Pertinent information has been shared with the patient in the After Visit Summary.    Diagnoses and all orders for this visit:    1. Medicare annual wellness visit, subsequent (Primary)    2. Benign essential HTN  -     CBC & Differential; Future  -     Comprehensive Metabolic Panel; Future    3. Mixed hyperlipidemia  -     Lipid Panel With LDL / HDL Ratio; Future    4. Anxiety, generalized    5. Elevated hemoglobin A1c  -     Hemoglobin A1c; Future    HTN: stable, continue candesartan 16 mg 1 tab daily and HCTZ 12.5 mg 1 tab daily  HLD: borderline, continue rosuvastatin 20 mg 1 tab daily; will check labs in 6 months  Anxiety: stable continue Venlafaxine 150 mg 1 cap daily  Elevated HgA1c: recommend working on a heart healthy low sugar diet, continue to drink plenty of water and continue to be active.     Follow Up:   Return for follow up with fasting labs and then follow up HTN/HLD/elevated HgA1c.     An After Visit Summary and PPPS were made available to the patient.                3/2021 Vascular screening done and wnl  Dexa scan will be done in 2023 along with mammogram

## 2022-05-16 ENCOUNTER — PATIENT MESSAGE (OUTPATIENT)
Dept: FAMILY MEDICINE CLINIC | Facility: CLINIC | Age: 72
End: 2022-05-16

## 2022-05-16 RX ORDER — OMEPRAZOLE 40 MG/1
CAPSULE, DELAYED RELEASE ORAL
Qty: 90 CAPSULE | Refills: 1 | OUTPATIENT
Start: 2022-05-16

## 2022-05-16 RX ORDER — CANDESARTAN 16 MG/1
TABLET ORAL
Qty: 90 TABLET | Refills: 1 | Status: SHIPPED | OUTPATIENT
Start: 2022-05-16 | End: 2022-09-06

## 2022-05-16 NOTE — TELEPHONE ENCOUNTER
Mary,      Please review and refill if appropriate. Let me know if anything additional is needed.      Thanks,  Jovani      Next OV 11/11/2022  Last OV 04/25/2022

## 2022-05-17 NOTE — TELEPHONE ENCOUNTER
From: Teagan Cates  To: MARISOL Holely  Sent: 5/16/2022 10:24 AM EDT  Subject: Refill omepr    The pharmacy said I need authorization for renewal of this medication

## 2022-05-17 NOTE — TELEPHONE ENCOUNTER
Called and spoke with this patient. This has been taken care of. Nothing further needed at this time. Dr. White with [facility] prescribes this medication. Dr. White refused the medication stating that patient needs an appointment for additional refills.

## 2022-05-19 NOTE — TELEPHONE ENCOUNTER
Caller: Teagan Cates    Relationship: Self    Best call back number: [phone]    Requested Prescriptions:   Requested Prescriptions     Pending Prescriptions Disp Refills   • omeprazole (priLOSEC) 40 MG capsule 90 capsule 1     Sig: Take 1 capsule by mouth Every Morning Before Breakfast.        Pharmacy where request should be sent: [facility]/PHARMACY #[id number] [place], [place] - [address] [facility]. AT [facility] [phone] [facility] [phone] FX     Additional details provided by patient: PATIENT STATES THAT SHE HAS 5 PILLS LEFT AND THE PRESCRIBING DOCTOR WILL NOT FILL UNTIL SHE IS SEEN BUT SHE CAN'T GET THRU TO SCHEDULE AN APPOINTMENT WITH THEM. SHE WOULD LIKE TO KNOW IF SHE CAN GET ONE MONTH WORTH OF MEDICATION. PLEASE ADVISE.     Does the patient have less than a 3 day supply:  [] Yes  [x] No    Francisco J Michaels Rep   05/19/22 10:29 EDT

## 2022-05-20 RX ORDER — OMEPRAZOLE 40 MG/1
40 CAPSULE, DELAYED RELEASE ORAL
Qty: 90 CAPSULE | Refills: 1 | Status: SHIPPED | OUTPATIENT
Start: 2022-05-20 | End: 2022-05-25 | Stop reason: SDUPTHER

## 2022-05-25 ENCOUNTER — TELEPHONE (OUTPATIENT)
Dept: GASTROENTEROLOGY | Facility: CLINIC | Age: 72
End: 2022-05-25

## 2022-05-25 RX ORDER — OMEPRAZOLE 40 MG/1
40 CAPSULE, DELAYED RELEASE ORAL
Qty: 90 CAPSULE | Refills: 1 | Status: SHIPPED | OUTPATIENT
Start: 2022-05-25 | End: 2022-06-29 | Stop reason: SDUPTHER

## 2022-05-25 NOTE — TELEPHONE ENCOUNTER
Dr White,  Pt is scheduled with [facility] 6/29.  The last time she was seen was on the day of her scope 3/15/21.  Refill previously denied due to needing an apt

## 2022-05-25 NOTE — TELEPHONE ENCOUNTER
----- Message from Francisco J Martinez sent at 5/25/2022  1:44 PM EDT -----  Regarding: omeprazole (priLOSEC) 40 MG capsule [19064]  Contact: [phone]  PT requesting a refill of omeprazole (priLOSEC) 40 MG capsule [[id number]] . Please call once ordered: [phone]

## 2022-05-31 RX ORDER — VENLAFAXINE HYDROCHLORIDE 150 MG/1
CAPSULE, EXTENDED RELEASE ORAL
Qty: 90 CAPSULE | Refills: 1 | Status: SHIPPED | OUTPATIENT
Start: 2022-05-31 | End: 2022-12-05

## 2022-06-29 ENCOUNTER — OFFICE VISIT (OUTPATIENT)
Dept: GASTROENTEROLOGY | Facility: CLINIC | Age: 72
End: 2022-06-29

## 2022-06-29 VITALS
SYSTOLIC BLOOD PRESSURE: 130 MMHG | BODY MASS INDEX: 25.99 KG/M2 | DIASTOLIC BLOOD PRESSURE: 76 MMHG | HEART RATE: 86 BPM | WEIGHT: 156 LBS | HEIGHT: 65 IN | TEMPERATURE: 96.6 F

## 2022-06-29 DIAGNOSIS — K21.9 GASTROESOPHAGEAL REFLUX DISEASE WITHOUT ESOPHAGITIS: ICD-10-CM

## 2022-06-29 DIAGNOSIS — K22.70 BARRETT'S ESOPHAGUS WITHOUT DYSPLASIA: ICD-10-CM

## 2022-06-29 DIAGNOSIS — R19.4 CHANGE IN BOWEL HABITS: Primary | ICD-10-CM

## 2022-06-29 DIAGNOSIS — K44.9 HIATAL HERNIA: ICD-10-CM

## 2022-06-29 PROCEDURE — 99214 OFFICE O/P EST MOD 30 MIN: CPT | Performed by: PHYSICIAN ASSISTANT

## 2022-06-29 RX ORDER — OMEPRAZOLE 40 MG/1
40 CAPSULE, DELAYED RELEASE ORAL
Qty: 90 CAPSULE | Refills: 1 | Status: SHIPPED | OUTPATIENT
Start: 2022-06-29

## 2022-06-29 RX ORDER — LANOLIN ALCOHOL/MO/W.PET/CERES
1000 CREAM (GRAM) TOPICAL DAILY
COMMUNITY

## 2022-06-29 NOTE — PROGRESS NOTES
Chief Complaint  Heartburn    Subjective        History of Present Illness  Teagan Cates is a  72 y.o. female patient of Dr. White, new to me today, here for follow-up for heartburn.    Her heartburn is currently well controlled with current regimen of 40 mg omeprazole daily.  She reports breakthrough occurring maybe once monthly.  She is concerned as she has noticed a change in bowel habits over the last 6 months.  Her stools have become more frequent and loose to watery in nature and occurring 1-2 times per day.  She denies any blood or melena, abdominal pain, unintentional weight loss.  Her last colonoscopy was in 2014 (?) and purportedly normal.    Labs reviewed from 4/15/2022 with normal renal function and LFTs, CBC was also reviewed and normal.    Past Medical History:   Diagnosis Date   • Anxiety    • GERD (gastroesophageal reflux disease)    • Hernia    • Hyperlipidemia    • Hypertension        Past Surgical History:   Procedure Laterality Date   • ABDOMINOPLASTY     • CATARACT EXTRACTION W/ INTRAOCULAR LENS IMPLANT Bilateral    • CHOLECYSTECTOMY     • COLONOSCOPY  02/2016    normal per pt    • ENDOSCOPY N/A 05/02/2017    large HH, gastritis, multiple gastric polyps, nodule in duodenum   • ENDOSCOPY N/A 03/15/2021    Procedure: ESOPHAGOGASTRODUODENOSCOPY WITH COLD BX'S;  Surgeon: Julianne White MD;  Location: [facility];  Service: Gastroenterology;  Laterality: N/A;  pre: hx hiatal hernia, GERD  post: hiatal hernia, gastritis, gastric plyps, lymphangiectasia.    • HYSTERECTOMY     • MOHS SURGERY  2017   • TUBAL ABDOMINAL LIGATION     • UPPER GASTROINTESTINAL ENDOSCOPY         Family History   Problem Relation Age of Onset   • Hyperlipidemia Mother    • Stroke Mother    • Heart disease Father    • Stroke Sister    • Inflammatory bowel disease Brother        Social History     Socioeconomic History   • Marital status:    Tobacco Use   • Smoking status: Never Smoker   • Smokeless tobacco:  "Never Used   Vaping Use   • Vaping Use: Never used   Substance and Sexual Activity   • Alcohol use: Yes     Alcohol/week: 20.0 standard drinks     Types: 20 Glasses of wine per week     Comment: SOCIAL   • Drug use: No   • Sexual activity: Yes     Partners: Male     Birth control/protection: Post-menopausal       Allergies   Allergen Reactions   • Sulfa Antibiotics Anaphylaxis     anaphylaxis   • Iodinated Diagnostic Agents Rash       Current Outpatient Medications on File Prior to Visit   Medication Sig Dispense Refill   • candesartan (ATACAND) 16 MG tablet TAKE 1 TABLET BY MOUTH EVERY DAY 90 tablet 1   • hydroCHLOROthiazide (HYDRODIURIL) 12.5 MG tablet TAKE 1 TABLET BY MOUTH EVERY DAY 90 tablet 3   • MAGNESIUM PO Take  by mouth.     • rosuvastatin (CRESTOR) 20 MG tablet TAKE 1 TABLET BY MOUTH EVERY DAY 90 tablet 1   • venlafaxine XR (EFFEXOR-XR) 150 MG 24 hr capsule TAKE 1 CAPSULE BY MOUTH EVERY DAY 90 capsule 1   • vitamin B-12 (CYANOCOBALAMIN) 1000 MCG tablet Take 1,000 mcg by mouth Daily.     • [DISCONTINUED] omeprazole (priLOSEC) 40 MG capsule Take 1 capsule by mouth Every Morning Before Breakfast. 90 capsule 1     No current facility-administered medications on file prior to visit.       Review of Systems   Constitutional: Negative for chills, fatigue, fever, unexpected weight gain and unexpected weight loss.   HENT: Negative for trouble swallowing.    Respiratory: Negative for cough and shortness of breath.    Cardiovascular: Negative for chest pain and palpitations.   Gastrointestinal: Positive for diarrhea and GERD. Negative for abdominal pain, constipation, nausea and vomiting.        Objective   Vital Signs:   /76   Pulse 86   Temp 96.6 °F (35.9 °C)   Ht 165.1 cm (65\")   Wt 70.8 kg (156 lb)   BMI 25.96 kg/m²       Physical Exam  Vitals and nursing note reviewed.   Constitutional:       General: She is not in acute distress.     Appearance: Normal appearance. She is not ill-appearing.   HENT: "      Head: Normocephalic and atraumatic.      Right Ear: External ear normal.      Left Ear: External ear normal.   Eyes:      General: No scleral icterus.     Conjunctiva/sclera: Conjunctivae normal.      Pupils: Pupils are equal, round, and reactive to light.   Cardiovascular:      Rate and Rhythm: Normal rate and regular rhythm.      Heart sounds: Murmur heard.   Pulmonary:      Effort: Pulmonary effort is normal.      Breath sounds: Normal breath sounds.   Abdominal:      General: Abdomen is flat. Bowel sounds are normal. There is no distension.      Palpations: Abdomen is soft.      Tenderness: There is no abdominal tenderness. There is no guarding or rebound.   Musculoskeletal:      Cervical back: Normal range of motion and neck supple.   Skin:     General: Skin is warm and dry.   Neurological:      Mental Status: She is alert and oriented to person, place, and time.   Psychiatric:         Mood and Affect: Mood normal.         Behavior: Behavior normal.          Result Review :       Common labs    Common Labsle 10/15/21 10/15/21 10/15/21 4/15/22 4/15/22 4/15/22 4/15/22    1032 1032 1032 0936 0936 0936 0936   Glucose  109 (A)   102 (A)     BUN  14   16     Creatinine  0.66   0.65     eGFR Non  Am  89        eGFR African Am  107        Sodium  141   143     Potassium  3.9   4.1     Chloride  102   102     Calcium  10.0   10.0     Total Protein  7.0   7.0     Albumin  4.70   4.4     Total Bilirubin  0.4   0.3     Alkaline Phosphatase  58   57     AST (SGOT)  29   26     ALT (SGPT)  41 (A)   28     WBC 7.85   7.3      Hemoglobin 13.7   13.4      Hematocrit 41.9   39.4      Platelets 347   345      Total Cholesterol   169   200 (A)    Triglycerides   122   146    HDL Cholesterol   63 (A)   71    LDL Cholesterol    85   104 (A)    Hemoglobin A1C       6.0 (A)   (A) Abnormal value       Comments are available for some flowsheets but are not being displayed.                               Assessment and Plan     Diagnoses and all orders for this visit:    1. Change in bowel habits (Primary)  -     Case Request; Standing  -     Case Request    2. Gastroesophageal reflux disease without esophagitis    3. Pedro's esophagus without dysplasia    4. Hiatal hernia    Other orders  -     omeprazole (priLOSEC) 40 MG capsule; Take 1 capsule by mouth Every Morning Before Breakfast.  Dispense: 90 capsule; Refill: 1      Pleasant 72-year-old female who presents regarding follow-up for GERD.  Her heartburn has been well controlled on current regimen of 40 mg with rare breakthrough symptoms occurring maybe once monthly.  She is also concerned about her recent change in bowel habits which occurred roughly 6 weeks ago and it failed to resolve.  Her last colonoscopy was completed in 2014.  There is no family history of colon cancer.    · Continue 40 mg omeprazole daily.  Antireflux measures and dietary modifications reviewed. Low acid diet reviewed. Keep head of bed elevated. Stop eating/drinking at least 3 hours prior to bedtime. Eliminate caffeine and carbonated beverages. Weight loss encouraged if BMI over 25.  Proceed with colonoscopy.  The benefits and risk (bleeding, perforation, anesthesia related complications) were discussed with the patient in detail.  Patient understands risks and agrees to proceed.        Follow Up   No follow-ups on file.    Dragon dictation used throughout this note.     WALDEMAR Reno

## 2022-09-05 DIAGNOSIS — E78.2 MIXED HYPERLIPIDEMIA: ICD-10-CM

## 2022-09-06 RX ORDER — HYDROCHLOROTHIAZIDE 12.5 MG/1
TABLET ORAL
Qty: 90 TABLET | Refills: 3 | Status: SHIPPED | OUTPATIENT
Start: 2022-09-06

## 2022-09-06 RX ORDER — CANDESARTAN 16 MG/1
TABLET ORAL
Qty: 90 TABLET | Refills: 1 | Status: SHIPPED | OUTPATIENT
Start: 2022-09-06 | End: 2022-11-29 | Stop reason: SDUPTHER

## 2022-09-06 RX ORDER — ROSUVASTATIN CALCIUM 20 MG/1
TABLET, COATED ORAL
Qty: 90 TABLET | Refills: 1 | Status: SHIPPED | OUTPATIENT
Start: 2022-09-06 | End: 2023-04-05

## 2022-09-19 ENCOUNTER — TELEPHONE (OUTPATIENT)
Dept: GASTROENTEROLOGY | Facility: CLINIC | Age: 72
End: 2022-09-19

## 2022-09-19 PROBLEM — R19.4 CHANGE IN BOWEL HABITS: Status: ACTIVE | Noted: 2022-09-19

## 2022-09-19 NOTE — TELEPHONE ENCOUNTER
ESHA Montgomery for COLONOSCOPY  on 11/8/22 arrive at 9am.Prep instructions mailed to address on file.

## 2022-11-18 ENCOUNTER — OFFICE VISIT (OUTPATIENT)
Dept: FAMILY MEDICINE CLINIC | Facility: CLINIC | Age: 72
End: 2022-11-18

## 2022-11-18 VITALS
SYSTOLIC BLOOD PRESSURE: 140 MMHG | TEMPERATURE: 98 F | HEART RATE: 90 BPM | OXYGEN SATURATION: 97 % | WEIGHT: 159 LBS | DIASTOLIC BLOOD PRESSURE: 80 MMHG | BODY MASS INDEX: 26.49 KG/M2 | RESPIRATION RATE: 18 BRPM | HEIGHT: 65 IN

## 2022-11-18 DIAGNOSIS — R73.09 ELEVATED HEMOGLOBIN A1C: ICD-10-CM

## 2022-11-18 DIAGNOSIS — R10.30 LOWER ABDOMINAL PAIN: ICD-10-CM

## 2022-11-18 DIAGNOSIS — F41.1 ANXIETY, GENERALIZED: ICD-10-CM

## 2022-11-18 DIAGNOSIS — E78.2 MIXED HYPERLIPIDEMIA: ICD-10-CM

## 2022-11-18 DIAGNOSIS — I10 BENIGN ESSENTIAL HTN: Primary | ICD-10-CM

## 2022-11-18 PROCEDURE — 99213 OFFICE O/P EST LOW 20 MIN: CPT | Performed by: NURSE PRACTITIONER

## 2022-11-18 NOTE — PATIENT INSTRUCTIONS
HTN: elevated; pt to start checking b/p and keeping log and then Call/send [employer] message in one month for home b/p results; continue candesartan 16 mg 1 tab daily and HCTZ 12.5 mg 1 tab daily (if staying elevated will increase candesartan to 1 1/2 tabs a day (to equal 24 mg daily)    HLD: borderline, total chol and LDL improved, triglycerides increased; continue rosuvastatin 20 mg 1 tab daily; need to work on decreasing sugary food/drinks    Anxiety: stable continue Venlafaxine 150 mg 1 cap daily    Elevated HgA1c: continue to work on a heart healthy low sugar diet, continue to drink plenty of water and continue to be active.     Lower abd. Pain: assessment wnl today, continue with colonoscopy at end of month; recommend not holding 2 year old grandchild as much (have her walk with her).

## 2022-11-18 NOTE — PROGRESS NOTES
Subjective     Teagan Cates is a 72 y.o.. female.     Pt here for follow up on HTN, HLD, anxiety and elevated HgA1c. Pt stating she is doing well. Pt stating she is not eating healthy at this time. Pt stating she is drinking water through out day and is staying active with grandchildren. Pt admits to eating halloween candy. Pt stating no other changes in life.     Hypertension        The following portions of the patient's history were reviewed and updated as appropriate: allergies, current medications, past family history, past medical history, past social history, past surgical history and problem list.    Past Medical History:   Diagnosis Date   • Allergic 1980    Sulfa   • Anxiety    • GERD (gastroesophageal reflux disease)    • Heart murmur ?   • Hernia    • Hyperlipidemia    • Hypertension        Past Surgical History:   Procedure Laterality Date   • ABDOMINOPLASTY     • CATARACT EXTRACTION W/ INTRAOCULAR LENS IMPLANT Bilateral    • CHOLECYSTECTOMY     • COLONOSCOPY  02/2016    normal per pt    • COSMETIC SURGERY  1995   • ENDOSCOPY N/A 05/02/2017    large HH, gastritis, multiple gastric polyps, nodule in duodenum   • ENDOSCOPY N/A 03/15/2021    Procedure: ESOPHAGOGASTRODUODENOSCOPY WITH COLD BX'S;  Surgeon: Julianne White MD;  Location: [facility];  Service: Gastroenterology;  Laterality: N/A;  pre: hx hiatal hernia, GERD  post: hiatal hernia, gastritis, gastric plyps, lymphangiectasia.    • EYE SURGERY  2015   • HYSTERECTOMY     • MOHS SURGERY  2017   • TUBAL ABDOMINAL LIGATION     • UPPER GASTROINTESTINAL ENDOSCOPY         Review of Systems   Constitutional: Negative.    Respiratory: Negative.    Cardiovascular: Negative.    Gastrointestinal: Negative for blood in stool, constipation, diarrhea, nausea, rectal pain and vomiting.        Soreness in lower abd; seen by GYN and exam was wnl. GI is doing colonoscopy at end of this month; admits to carrying her 2 yr old grandchild a lot.    "  Genitourinary: Negative for difficulty urinating, dysuria, frequency, hematuria and urgency.       Allergies   Allergen Reactions   • Sulfa Antibiotics Anaphylaxis     anaphylaxis   • Iodinated Diagnostic Agents Rash       Objective     Vitals:    11/18/22 1022   BP: 140/80   Pulse: 90   Resp: 18   Temp: 98 °F (36.7 °C)   TempSrc: Infrared   SpO2: 97%   Weight: 72.1 kg (159 lb)   Height: 165.1 cm (65\")     Body mass index is 26.46 kg/m².    Physical Exam  Vitals reviewed.   HENT:      Head: Normocephalic.   Eyes:      Pupils: Pupils are equal, round, and reactive to light.   Neck:      Vascular: No carotid bruit.   Cardiovascular:      Rate and Rhythm: Normal rate and regular rhythm.      Pulses: Normal pulses.   Pulmonary:      Effort: Pulmonary effort is normal. No respiratory distress.      Breath sounds: Normal breath sounds. No stridor. No wheezing, rhonchi or rales.   Abdominal:      General: Abdomen is flat. Bowel sounds are normal. There is no distension.      Palpations: Abdomen is soft. There is no mass.      Tenderness: There is no abdominal tenderness. There is no guarding or rebound.      Hernia: No hernia is present.   Musculoskeletal:         General: Normal range of motion.      Right lower leg: No edema.      Left lower leg: No edema.   Skin:     General: Skin is warm and dry.   Neurological:      Mental Status: She is alert and oriented to person, place, and time.   Psychiatric:         Behavior: Behavior normal.           Current Outpatient Medications:   •  candesartan (ATACAND) 16 MG tablet, TAKE 1 TABLET BY MOUTH EVERY DAY, Disp: 90 tablet, Rfl: 1  •  hydroCHLOROthiazide (HYDRODIURIL) 12.5 MG tablet, TAKE 1 TABLET BY MOUTH EVERY DAY, Disp: 90 tablet, Rfl: 3  •  MAGNESIUM PO, Take  by mouth., Disp: , Rfl:   •  omeprazole (priLOSEC) 40 MG capsule, Take 1 capsule by mouth Every Morning Before Breakfast., Disp: 90 capsule, Rfl: 1  •  rosuvastatin (CRESTOR) 20 MG tablet, TAKE 1 TABLET BY MOUTH " EVERY DAY, Disp: 90 tablet, Rfl: 1  •  venlafaxine XR (EFFEXOR-XR) 150 MG 24 hr capsule, TAKE 1 CAPSULE BY MOUTH EVERY DAY, Disp: 90 capsule, Rfl: 1  •  vitamin B-12 (CYANOCOBALAMIN) 1000 MCG tablet, Take 1,000 mcg by mouth Daily., Disp: , Rfl:     Recent Results (from the past 2016 hour(s))   Lipid Panel With LDL / HDL Ratio    Collection Time: 11/15/22 10:20 AM    Specimen: Blood   Result Value Ref Range    Total Cholesterol 198 0 - 200 mg/dL    Triglycerides 220 (H) 0 - 150 mg/dL    HDL Cholesterol 68 (H) 40 - 60 mg/dL    VLDL Cholesterol Joni 37 5 - 40 mg/dL    LDL Chol Calc (NIH) 93 0 - 100 mg/dL    LDL/HDL RATIO 1.26    CBC & Differential    Collection Time: 11/15/22 10:20 AM    Specimen: Blood   Result Value Ref Range    WBC 9.02 3.40 - 10.80 10*3/mm3    RBC 4.14 3.77 - 5.28 10*6/mm3    Hemoglobin 13.4 12.0 - 15.9 g/dL    Hematocrit 38.9 34.0 - 46.6 %    MCV 94.0 79.0 - 97.0 fL    MCH 32.4 26.6 - 33.0 pg    MCHC 34.4 31.5 - 35.7 g/dL    RDW 12.2 (L) 12.3 - 15.4 %    Platelets 351 140 - 450 10*3/mm3    Neutrophil Rel % 54.1 42.7 - 76.0 %    Lymphocyte Rel % 33.7 19.6 - 45.3 %    Monocyte Rel % 6.3 5.0 - 12.0 %    Eosinophil Rel % 4.7 0.3 - 6.2 %    Basophil Rel % 0.6 0.0 - 1.5 %    Neutrophils Absolute 4.89 1.70 - 7.00 10*3/mm3    Lymphocytes Absolute 3.04 0.70 - 3.10 10*3/mm3    Monocytes Absolute 0.57 0.10 - 0.90 10*3/mm3    Eosinophils Absolute 0.42 (H) 0.00 - 0.40 10*3/mm3    Basophils Absolute 0.05 0.00 - 0.20 10*3/mm3    Immature Granulocyte Rel % 0.6 (H) 0.0 - 0.5 %    Immature Grans Absolute 0.05 0.00 - 0.05 10*3/mm3    nRBC 0.1 0.0 - 0.2 /100 WBC   Comprehensive Metabolic Panel    Collection Time: 11/15/22 10:20 AM    Specimen: Blood   Result Value Ref Range    Glucose 115 (H) 65 - 99 mg/dL    BUN 13 8 - 23 mg/dL    Creatinine 0.61 0.57 - 1.00 mg/dL    EGFR Result 95.7 >60.0 mL/min/1.73    BUN/Creatinine Ratio 21.3 7.0 - 25.0    Sodium 141 136 - 145 mmol/L    Potassium 3.9 3.5 - 5.2 mmol/L    Chloride  101 98 - 107 mmol/L    Total CO2 27.6 22.0 - 29.0 mmol/L    Calcium 9.7 8.6 - 10.5 mg/dL    Total Protein 7.1 6.0 - 8.5 g/dL    Albumin 4.50 3.50 - 5.20 g/dL    Globulin 2.6 gm/dL    A/G Ratio 1.7 g/dL    Total Bilirubin 0.4 0.0 - 1.2 mg/dL    Alkaline Phosphatase 51 39 - 117 U/L    AST (SGOT) 34 (H) 1 - 32 U/L    ALT (SGPT) 35 (H) 1 - 33 U/L   Hemoglobin A1c    Collection Time: 11/15/22 10:20 AM    Specimen: Blood   Result Value Ref Range    Hemoglobin A1C 5.80 (H) 4.80 - 5.60 %       US Liver  Narrative: Examination: Liver sonogram     HISTORY: 71-year-old female with a history of elevated liver enzymes     FINDINGS: Sonographic evaluation of the liver and selected structures of  the right upper quadrant was performed. No prior examinations available  for comparison.  The right kidney has a normal appearance. Increased  echogenicity is seen throughout the liver without evidence for focal  abnormality. The liver measures on the order of 17.3 cm in anterior to  posterior dimension. The portal vein is patent with appropriate  directional flow. There is no intrahepatic bile duct dilatation. The  gallbladder has been surgically removed. The common bile duct measures  0.5 cm in diameter. The visualized portion of the pancreas appears  normal.     Impression: 1. There is evidence of diffuse hepatic steatosis.  2. The patient status post cholecystectomy.     This report was finalized on 7/9/2021 1:54 PM by Dr. Teodoro Armando M.D.     Vascular screening (bundle) CAR  · Carotid Artery Disease and Stroke Screening: The right carotid artery is   normal. The left carotid artery is normal.  · Abdominal Aortic Aneurysm (AAA) Screening: Maximum proximal diameter of   1.89 cm. The artery was normal.  · Peripheral Artery Disease (P.A.D.) Screening: The right has normal   arterial pressures. The left has normal arterial pressures.       Diagnoses and all orders for this visit:    1. Benign essential HTN (Primary)    2. Mixed  hyperlipidemia    3. Anxiety, generalized    4. Elevated hemoglobin A1c    5. Lower abdominal pain        Patient Instructions   HTN: elevated; pt to start checking b/p and keeping log and then Call/send [email or web address] message in one month for home b/p results; continue candesartan 16 mg 1 tab daily and HCTZ 12.5 mg 1 tab daily (if staying elevated will increase candesartan to 1 1/2 tabs a day (to equal 24 mg daily)    HLD: borderline, total chol and LDL improved, triglycerides increased; continue rosuvastatin 20 mg 1 tab daily; need to work on decreasing sugary food/drinks    Anxiety: stable continue Venlafaxine 150 mg 1 cap daily    Elevated HgA1c: continue to work on a heart healthy low sugar diet, continue to drink plenty of water and continue to be active.     Lower abd. Pain: assessment wnl today, continue with colonoscopy at end of month; recommend not holding 2 year old grandchild as much (have her walk with her).         Return for 6 months for fasting lab and then AWV.

## 2022-11-29 ENCOUNTER — TELEPHONE (OUTPATIENT)
Dept: GASTROENTEROLOGY | Facility: CLINIC | Age: 72
End: 2022-11-29

## 2022-11-29 DIAGNOSIS — I10 BENIGN ESSENTIAL HTN: Primary | ICD-10-CM

## 2022-11-29 RX ORDER — CANDESARTAN 16 MG/1
24 TABLET ORAL DAILY
Qty: 135 TABLET | Refills: 1 | Status: SHIPPED | OUTPATIENT
Start: 2022-11-29 | End: 2023-01-17 | Stop reason: SDUPTHER

## 2022-11-29 NOTE — TELEPHONE ENCOUNTER
Hub staff attempted to follow warm transfer process and was unsuccessful     Caller: Teagan Cates    Relationship to patient: Self    Best call back number: [phone]    Patient is needing: PATIENT CANCEL PROCEDURE FOR 11/30/22. SHE WOULD NOW LIKE TO GET THAT PROCEDURE DATE BACK. WOULD LIKE TO SPEAK WITH SCHEDULING.

## 2022-12-01 ENCOUNTER — TELEPHONE (OUTPATIENT)
Dept: GASTROENTEROLOGY | Facility: CLINIC | Age: 72
End: 2022-12-01

## 2022-12-01 NOTE — TELEPHONE ENCOUNTER
ESHA patient via telephone for. Scheduled 12/16/2022 with arrival time of 1015a. Prep paperwork mailed to verified address on file. Patient advised arrival time may change based on [facility] guidelines. ESHA WILKINSON

## 2022-12-05 RX ORDER — VENLAFAXINE HYDROCHLORIDE 150 MG/1
CAPSULE, EXTENDED RELEASE ORAL
Qty: 90 CAPSULE | Refills: 1 | Status: SHIPPED | OUTPATIENT
Start: 2022-12-05

## 2022-12-16 ENCOUNTER — HOSPITAL ENCOUNTER (OUTPATIENT)
Facility: HOSPITAL | Age: 72
Setting detail: HOSPITAL OUTPATIENT SURGERY
Discharge: HOME | End: 2022-12-16
Attending: INTERNAL MEDICINE | Admitting: INTERNAL MEDICINE

## 2022-12-16 ENCOUNTER — ANESTHESIA EVENT (OUTPATIENT)
Dept: GASTROENTEROLOGY | Facility: HOSPITAL | Age: 72
End: 2022-12-16

## 2022-12-16 ENCOUNTER — ANESTHESIA (OUTPATIENT)
Dept: GASTROENTEROLOGY | Facility: HOSPITAL | Age: 72
End: 2022-12-16

## 2022-12-16 VITALS
RESPIRATION RATE: 17 BRPM | HEIGHT: 65 IN | HEART RATE: 78 BPM | BODY MASS INDEX: 25.33 KG/M2 | WEIGHT: 152 LBS | SYSTOLIC BLOOD PRESSURE: 144 MMHG | DIASTOLIC BLOOD PRESSURE: 69 MMHG | OXYGEN SATURATION: 96 %

## 2022-12-16 DIAGNOSIS — R19.4 CHANGE IN BOWEL HABITS: ICD-10-CM

## 2022-12-16 PROCEDURE — 45380 COLONOSCOPY AND BIOPSY: CPT | Performed by: INTERNAL MEDICINE

## 2022-12-16 PROCEDURE — 25010000002 PROPOFOL 10 MG/ML EMULSION: Performed by: ANESTHESIOLOGY

## 2022-12-16 PROCEDURE — 88305 TISSUE EXAM BY PATHOLOGIST: CPT | Performed by: INTERNAL MEDICINE

## 2022-12-16 PROCEDURE — 45385 COLONOSCOPY W/LESION REMOVAL: CPT | Performed by: INTERNAL MEDICINE

## 2022-12-16 PROCEDURE — S0260 H&P FOR SURGERY: HCPCS | Performed by: INTERNAL MEDICINE

## 2022-12-16 RX ORDER — SODIUM CHLORIDE 0.9 % (FLUSH) 0.9 %
10 SYRINGE (ML) INJECTION AS NEEDED
Status: DISCONTINUED | OUTPATIENT
Start: 2022-12-16 | End: 2022-12-16 | Stop reason: HOSPADM

## 2022-12-16 RX ORDER — SODIUM CHLORIDE 9 MG/ML
40 INJECTION, SOLUTION INTRAVENOUS AS NEEDED
Status: DISCONTINUED | OUTPATIENT
Start: 2022-12-16 | End: 2022-12-16 | Stop reason: HOSPADM

## 2022-12-16 RX ORDER — LIDOCAINE HYDROCHLORIDE 20 MG/ML
INJECTION, SOLUTION INFILTRATION; PERINEURAL AS NEEDED
Status: DISCONTINUED | OUTPATIENT
Start: 2022-12-16 | End: 2022-12-16 | Stop reason: SURG

## 2022-12-16 RX ORDER — SODIUM CHLORIDE, SODIUM LACTATE, POTASSIUM CHLORIDE, CALCIUM CHLORIDE 600; 310; 30; 20 MG/100ML; MG/100ML; MG/100ML; MG/100ML
30 INJECTION, SOLUTION INTRAVENOUS CONTINUOUS PRN
Status: DISCONTINUED | OUTPATIENT
Start: 2022-12-16 | End: 2022-12-16 | Stop reason: HOSPADM

## 2022-12-16 RX ORDER — PROPOFOL 10 MG/ML
VIAL (ML) INTRAVENOUS CONTINUOUS PRN
Status: DISCONTINUED | OUTPATIENT
Start: 2022-12-16 | End: 2022-12-16 | Stop reason: SURG

## 2022-12-16 RX ORDER — SODIUM CHLORIDE 0.9 % (FLUSH) 0.9 %
10 SYRINGE (ML) INJECTION EVERY 12 HOURS SCHEDULED
Status: DISCONTINUED | OUTPATIENT
Start: 2022-12-16 | End: 2022-12-16 | Stop reason: HOSPADM

## 2022-12-16 RX ADMIN — LIDOCAINE HYDROCHLORIDE 60 MG: 20 INJECTION, SOLUTION INFILTRATION; PERINEURAL at 12:26

## 2022-12-16 RX ADMIN — PROPOFOL 100 MCG/KG/MIN: 10 INJECTION, EMULSION INTRAVENOUS at 12:26

## 2022-12-16 RX ADMIN — SODIUM CHLORIDE, POTASSIUM CHLORIDE, SODIUM LACTATE AND CALCIUM CHLORIDE 30 ML/HR: 600; 310; 30; 20 INJECTION, SOLUTION INTRAVENOUS at 10:44

## 2022-12-16 NOTE — ANESTHESIA POSTPROCEDURE EVALUATION
Patient: Teagan Cates    Procedure Summary     Date: 12/16/22 Room / [facility]:  [facility]    Anesthesia Start: 1226 Anesthesia Stop: 1306    Procedure: COLONOSCOPY TO CECUM/TI WITH COLD SNARE POLYPECTOMIES AND RANDOM COLON BIOPSIES Diagnosis:       Change in bowel habits      (Change in bowel habits [R19.4])    Surgeons: Julianne White MD Provider: Dariela Sena MD    Anesthesia Type: MAC ASA Status: 2          Anesthesia Type: MAC    Vitals  No vitals data found for the desired time range.          Post Anesthesia Care and Evaluation    Patient location during evaluation: bedside  Patient participation: complete - patient participated  Level of consciousness: awake  Pain management: adequate    Airway patency: patent  Anesthetic complications: No anesthetic complications    Cardiovascular status: acceptable  Respiratory status: acceptable  Hydration status: acceptable

## 2022-12-16 NOTE — ANESTHESIA PREPROCEDURE EVALUATION
Anesthesia Evaluation                  Airway   Mallampati: II  TM distance: >3 FB  Neck ROM: full  No difficulty expected  Dental - normal exam     Pulmonary - normal exam   Cardiovascular - normal exam    (+) hypertension 2 medications or greater, valvular problems/murmurs murmur, hyperlipidemia,       Neuro/Psych  (+) psychiatric history Anxiety,    GI/Hepatic/Renal/Endo    (+)  hiatal hernia, GERD,      Musculoskeletal     Abdominal    Substance History      OB/GYN          Other                        Anesthesia Plan    ASA 2     MAC       Anesthetic plan, risks, benefits, and alternatives have been provided, discussed and informed consent has been obtained with: patient.        CODE STATUS:

## 2022-12-16 NOTE — H&P
[facility]  Pre Procedure History & Physical    Chief Complaint: Change in bowel habits      HPI: 72-year-old woman here for colonoscopy.  She has had a change in bowel habits consisting of more frequent, watery, and loose stools.  She otherwise feels well.    Past Medical History:   Past Medical History:   Diagnosis Date   • Allergic 1980    Sulfa   • Anxiety    • GERD (gastroesophageal reflux disease)    • Heart murmur ?   • Hernia    • Hyperlipidemia    • Hypertension        Family History:  Family History   Problem Relation Age of Onset   • Hyperlipidemia Mother    • Stroke Mother    • Heart disease Father    • Stroke Sister    • Inflammatory bowel disease Brother        Social History:   reports that she has never smoked. She has never used smokeless tobacco. She reports current alcohol use of about 20.0 standard drinks per week. She reports that she does not use drugs.    Medications:   No medications prior to admission.       Allergies:  Sulfa antibiotics and Iodinated diagnostic agents    ROS:    Pertinent items are noted in HPI     Objective     not currently breastfeeding.    Physical Exam   Constitutional: Pt is oriented to person, place, and time and well-developed, well-nourished, and in no distress.   HENT:   Mouth/Throat: Oropharynx is clear and moist.   Neck: Normal range of motion. Neck supple.   Cardiovascular: Normal rate, regular rhythm and normal heart sounds.    Pulmonary/Chest: Effort normal and breath sounds normal. No respiratory distress. No  wheezes.   Abdominal: Soft. Bowel sounds are normal.   Skin: Skin is warm and dry.   Psychiatric: Mood, memory, affect and judgment normal.     Assessment & Plan     Diagnosis: Change in bowel habits      Anticipated Surgical Procedure:    Colonoscopy    The risks, benefits, and alternatives of this procedure have been discussed with the patient or the responsible party- the patient understands and agrees to proceed.

## 2022-12-19 LAB
LAB AP CASE REPORT: NORMAL
LAB AP DIAGNOSIS COMMENT: NORMAL
PATH REPORT.FINAL DX SPEC: NORMAL
PATH REPORT.GROSS SPEC: NORMAL

## 2022-12-19 NOTE — PROGRESS NOTES
Her colon polyps included 2 tubular adenomas and 2 hyperplastic polyps    Please place in 7-year colonoscopy recall    The random colon biopsies showed very minimal eosinophilic inflammation that the pathologist notes is nonspecific and can be seen with irritation from the bowel prep versus infectious colitis.  If she is no longer having diarrhea, I would chalked this up to the bowel prep.  If she is having diarrhea, I can send her a short course of medication to get this settled down.    Office follow-up in 3 to 4 months

## 2022-12-20 ENCOUNTER — TELEPHONE (OUTPATIENT)
Dept: GASTROENTEROLOGY | Facility: CLINIC | Age: 72
End: 2022-12-20

## 2022-12-20 NOTE — TELEPHONE ENCOUNTER
Her colon polyps included 2 tubular adenomas and 2 hyperplastic polyps     Please place in 7-year colonoscopy recall     The random colon biopsies showed very minimal eosinophilic inflammation that the pathologist notes is nonspecific and can be seen with irritation from the bowel prep versus infectious colitis.  If she is no longer having diarrhea, I would chalked this up to the bowel prep.  If she is having diarrhea, I can send her a short course of medication to get this settled down.     Office follow-up in 3 to 4 months   Written by Julianne White MD on 12/19/2022  3:40 PM EST  Seen by patient Teagan Cates on 12/20/2022  8:37 AM      C/s placed in recall and  for 12/16/2029.    Reminder for pt to make [facility]

## 2023-01-11 ENCOUNTER — TELEPHONE (OUTPATIENT)
Dept: FAMILY MEDICINE CLINIC | Facility: CLINIC | Age: 73
End: 2023-01-11
Payer: MEDICARE

## 2023-01-11 NOTE — TELEPHONE ENCOUNTER
Pt stated that she has been taking  2 candesartan daily. Pt stated the BP has been running 140's and 150's over 70's and 80's. Would like to schedule an appointment to be seen.

## 2023-01-17 ENCOUNTER — OFFICE VISIT (OUTPATIENT)
Dept: FAMILY MEDICINE CLINIC | Facility: CLINIC | Age: 73
End: 2023-01-17
Payer: MEDICARE

## 2023-01-17 VITALS
HEIGHT: 65 IN | HEART RATE: 100 BPM | BODY MASS INDEX: 26.23 KG/M2 | OXYGEN SATURATION: 96 % | RESPIRATION RATE: 18 BRPM | TEMPERATURE: 97.3 F | SYSTOLIC BLOOD PRESSURE: 120 MMHG | WEIGHT: 157.4 LBS | DIASTOLIC BLOOD PRESSURE: 80 MMHG

## 2023-01-17 DIAGNOSIS — I10 BENIGN ESSENTIAL HTN: ICD-10-CM

## 2023-01-17 PROCEDURE — 99213 OFFICE O/P EST LOW 20 MIN: CPT | Performed by: NURSE PRACTITIONER

## 2023-01-17 RX ORDER — CANDESARTAN 32 MG/1
32 TABLET ORAL DAILY
Qty: 90 TABLET | Refills: 2 | Status: SHIPPED | OUTPATIENT
Start: 2023-01-17

## 2023-01-17 NOTE — PROGRESS NOTES
Subjective     Teagan Cates is a 72 y.o.. female.     Pt here today for follow up on HTN. Pt having increasing b/p recently. On 1/11/23 pt called and stated she had been taking  2 candesartan daily due to elevated b/p. Pt stated the BP has been running 140's and 150's over 70's and 80's. Pt still taking HCTZ 12.5 mg 1 tab daily. Pt stating she has been eating healthy and drinking water through out day. Pt denies exercising at this time.       The following portions of the patient's history were reviewed and updated as appropriate: allergies, current medications, past family history, past medical history, past social history, past surgical history and problem list.    Past Medical History:   Diagnosis Date   • Allergic 1980    Sulfa   • Anxiety    • GERD (gastroesophageal reflux disease)    • Heart murmur ?   • Hernia    • Hyperlipidemia    • Hypertension        Past Surgical History:   Procedure Laterality Date   • ABDOMINOPLASTY     • BREAST SURGERY Bilateral     breast reduction   • CATARACT EXTRACTION W/ INTRAOCULAR LENS IMPLANT Bilateral    • CHOLECYSTECTOMY     • COLONOSCOPY  02/2016    normal per pt    • COLONOSCOPY N/A 12/16/2022    Procedure: COLONOSCOPY TO CECUM/TI WITH COLD SNARE POLYPECTOMIES AND RANDOM COLON BIOPSIES;  Surgeon: Julianne White MD;  Location: [facility] ENDOSCOPY;  Service: Gastroenterology;  Laterality: N/A;  pre: LOWER ABDOMINAL PAIN, CHANGE IN BOWEL  post: POLYPS, HEMORRHOIDS   • COSMETIC SURGERY  1995   • ENDOSCOPY N/A 05/02/2017    large HH, gastritis, multiple gastric polyps, nodule in duodenum   • ENDOSCOPY N/A 03/15/2021    Procedure: ESOPHAGOGASTRODUODENOSCOPY WITH COLD BX'S;  Surgeon: Julianne White MD;  Location: [facility] ENDOSCOPY;  Service: Gastroenterology;  Laterality: N/A;  pre: hx hiatal hernia, GERD  post: hiatal hernia, gastritis, gastric plyps, lymphangiectasia.    • EYE SURGERY  2015   • HYSTERECTOMY     • MOHS SURGERY  2017   • TUBAL ABDOMINAL LIGATION     •  "UPPER GASTROINTESTINAL ENDOSCOPY         Review of Systems   Constitutional: Negative.    Respiratory: Negative.    Cardiovascular: Negative.        Allergies   Allergen Reactions   • Sulfa Antibiotics Anaphylaxis     anaphylaxis   • Iodinated Contrast Media Rash       Objective     Vitals:    01/17/23 0854   BP: 120/80   Pulse: 100   Resp: 18   Temp: 97.3 °F (36.3 °C)   TempSrc: Temporal   SpO2: 96%   Weight: 71.4 kg (157 lb 6.4 oz)   Height: 165.1 cm (65\")     Body mass index is 26.19 kg/m².    Physical Exam  Vitals reviewed.   HENT:      Head: Normocephalic.   Eyes:      Pupils: Pupils are equal, round, and reactive to light.   Neck:      Vascular: No carotid bruit.   Cardiovascular:      Rate and Rhythm: Normal rate and regular rhythm.      Pulses: Normal pulses.   Pulmonary:      Effort: Pulmonary effort is normal.      Breath sounds: Normal breath sounds.   Musculoskeletal:         General: Normal range of motion.      Right lower leg: No edema.      Left lower leg: No edema.   Skin:     General: Skin is warm and dry.   Neurological:      Mental Status: She is alert and oriented to person, place, and time.   Psychiatric:         Behavior: Behavior normal.           Current Outpatient Medications:   •  candesartan (ATACAND) 32 MG tablet, Take 1 tablet by mouth Daily., Disp: 90 tablet, Rfl: 2  •  hydroCHLOROthiazide (HYDRODIURIL) 12.5 MG tablet, TAKE 1 TABLET BY MOUTH EVERY DAY, Disp: 90 tablet, Rfl: 3  •  MAGNESIUM PO, Take  by mouth., Disp: , Rfl:   •  omeprazole (priLOSEC) 40 MG capsule, Take 1 capsule by mouth Every Morning Before Breakfast., Disp: 90 capsule, Rfl: 1  •  rosuvastatin (CRESTOR) 20 MG tablet, TAKE 1 TABLET BY MOUTH EVERY DAY, Disp: 90 tablet, Rfl: 1  •  venlafaxine XR (EFFEXOR-XR) 150 MG 24 hr capsule, TAKE 1 CAPSULE BY MOUTH EVERY DAY, Disp: 90 capsule, Rfl: 1  •  vitamin B-12 (CYANOCOBALAMIN) 1000 MCG tablet, Take 1,000 mcg by mouth Daily., Disp: , Rfl:     Recent Results (from the past 2016 " hour(s))   Lipid Panel With LDL / HDL Ratio    Collection Time: 11/15/22 10:20 AM    Specimen: Blood   Result Value Ref Range    Total Cholesterol 198 0 - 200 mg/dL    Triglycerides 220 (H) 0 - 150 mg/dL    HDL Cholesterol 68 (H) 40 - 60 mg/dL    VLDL Cholesterol Joni 37 5 - 40 mg/dL    LDL Chol Calc (NIH) 93 0 - 100 mg/dL    LDL/HDL RATIO 1.26    CBC & Differential    Collection Time: 11/15/22 10:20 AM    Specimen: Blood   Result Value Ref Range    WBC 9.02 3.40 - 10.80 10*3/mm3    RBC 4.14 3.77 - 5.28 10*6/mm3    Hemoglobin 13.4 12.0 - 15.9 g/dL    Hematocrit 38.9 34.0 - 46.6 %    MCV 94.0 79.0 - 97.0 fL    MCH 32.4 26.6 - 33.0 pg    MCHC 34.4 31.5 - 35.7 g/dL    RDW 12.2 (L) 12.3 - 15.4 %    Platelets 351 140 - 450 10*3/mm3    Neutrophil Rel % 54.1 42.7 - 76.0 %    Lymphocyte Rel % 33.7 19.6 - 45.3 %    Monocyte Rel % 6.3 5.0 - 12.0 %    Eosinophil Rel % 4.7 0.3 - 6.2 %    Basophil Rel % 0.6 0.0 - 1.5 %    Neutrophils Absolute 4.89 1.70 - 7.00 10*3/mm3    Lymphocytes Absolute 3.04 0.70 - 3.10 10*3/mm3    Monocytes Absolute 0.57 0.10 - 0.90 10*3/mm3    Eosinophils Absolute 0.42 (H) 0.00 - 0.40 10*3/mm3    Basophils Absolute 0.05 0.00 - 0.20 10*3/mm3    Immature Granulocyte Rel % 0.6 (H) 0.0 - 0.5 %    Immature Grans Absolute 0.05 0.00 - 0.05 10*3/mm3    nRBC 0.1 0.0 - 0.2 /100 WBC   Comprehensive Metabolic Panel    Collection Time: 11/15/22 10:20 AM    Specimen: Blood   Result Value Ref Range    Glucose 115 (H) 65 - 99 mg/dL    BUN 13 8 - 23 mg/dL    Creatinine 0.61 0.57 - 1.00 mg/dL    EGFR Result 95.7 >60.0 mL/min/1.73    BUN/Creatinine Ratio 21.3 7.0 - 25.0    Sodium 141 136 - 145 mmol/L    Potassium 3.9 3.5 - 5.2 mmol/L    Chloride 101 98 - 107 mmol/L    Total CO2 27.6 22.0 - 29.0 mmol/L    Calcium 9.7 8.6 - 10.5 mg/dL    Total Protein 7.1 6.0 - 8.5 g/dL    Albumin 4.50 3.50 - 5.20 g/dL    Globulin 2.6 gm/dL    A/G Ratio 1.7 g/dL    Total Bilirubin 0.4 0.0 - 1.2 mg/dL    Alkaline Phosphatase 51 39 - 117 U/L     AST (SGOT) 34 (H) 1 - 32 U/L    ALT (SGPT) 35 (H) 1 - 33 U/L   Hemoglobin A1c    Collection Time: 11/15/22 10:20 AM    Specimen: Blood   Result Value Ref Range    Hemoglobin A1C 5.80 (H) 4.80 - 5.60 %   Tissue Pathology Exam    Collection Time: 12/16/22 12:42 PM    Specimen: A: Large Intestine, Cecum; Polyp    B: Large Intestine; Polyp    C: Large Intestine, Left / Descending Colon; Polyp    D: Large Intestine, Sigmoid Colon; Polyp   Result Value Ref Range    Case Report       Surgical Pathology Report                         Case: [id number]                                  Authorizing Provider:  Julianne White MD        Collected:           12/16/2022 12:42 PM          Ordering Location:     [facility]  Received:            12/16/2022 01:37 PM                                 ENDO SUITES                                                                  Pathologist:           Stefano Rashid MD                                                         Specimens:   1) - Large Intestine, Cecum, CECAL POLYP                                                            2) - Large Intestine, RANDOM COLON BX'S                                                             3) - Large Intestine, Left / Descending Colon, DESCENDING COLON POLYPS X2                           4) - Large Intestine, Sigmoid Colon, SIGMOID POLYP                                         Final Diagnosis       1. Cecal Biopsy:   A. Hyperplastic polyp.  B. No glandular dysplasia nor malignancy identified.     2. Random Colon Biopsy: Colonic mucosa with    A. Mild crypt distortion and minimal eosinophilic cryptitis.   B. No well-developed crypt abscess formation nor granulomatous inflammation identified.    3. Descending Colon Biopsy:   A. Developing tubular adenoma.  B. No high-grade dysplasia nor malignancy identified.     4. Sigmoid Colon Polyp Biopsy:   A. Developing hyperplastic polyp.  B. No glandular dysplasia nor malignancy  identified.    jat/jse       Comment       The biopsy from part 2 (random colon) shows focal mild crypt distortion with rare eosinophilic cryptitis.  This nonspecific feature could be seen with self-limited colitis, bowel prep and possible infectious colitis.  Clinical correlation is required.    jat/jse       Gross Description       1. Large Intestine, Cecum.  The specimen is received in formalin labeled with the patient's name and further designated 'cecal biopsy' are multiple small fragments of gray-tan tissue. The specimen is submitted for embedding as received.    2. Large Intestine.  The specimen is received in formalin labeled with the patient's name and further designated 'random colon biopsy' are multiple small fragments of gray-tan tissue. The specimen is submitted for embedding as received.    3. Large Intestine, Left / Descending Colon.  The specimen is received in formalin labeled with the patient's name and further designated 'descending colon biopsy' are multiple small fragments of gray-tan tissue. The specimen is submitted for embedding as received.    4. Large Intestine, Sigmoid Colon.  The specimen is received in formalin labeled with the patient's name and further designated 'sigmoid colon biopsy' is a small fragment of gray-tan tissue. The specimen is submitted for embedding as received.             Diagnoses and all orders for this visit:    1. Benign essential HTN  -     candesartan (ATACAND) 32 MG tablet; Take 1 tablet by mouth Daily.  Dispense: 90 tablet; Refill: 2        Patient Instructions   Eat a heart healthy diet  Drink plenty of water, goal 64 oz a day  Exercise 3-5 times a week, for more than 30 minutes at a time  Continue to stay on candesartan 32 mg daily; continue HCTZ 12.5 mg 1 tab daily      Return if symptoms worsen or fail to improve, for keep appts in May (fasting labs and AWV).

## 2023-01-17 NOTE — PATIENT INSTRUCTIONS
Eat a heart healthy diet  Drink plenty of water, goal 64 oz a day  Exercise 3-5 times a week, for more than 30 minutes at a time  Continue to stay on candesartan 32 mg daily; continue HCTZ 12.5 mg 1 tab daily

## 2023-04-05 DIAGNOSIS — E78.2 MIXED HYPERLIPIDEMIA: ICD-10-CM

## 2023-04-05 RX ORDER — ROSUVASTATIN CALCIUM 20 MG/1
TABLET, COATED ORAL
Qty: 90 TABLET | Refills: 1 | Status: SHIPPED | OUTPATIENT
Start: 2023-04-05

## 2023-05-05 DIAGNOSIS — I10 BENIGN ESSENTIAL HTN: Primary | ICD-10-CM

## 2023-05-05 DIAGNOSIS — R73.09 ELEVATED HEMOGLOBIN A1C: ICD-10-CM

## 2023-05-05 DIAGNOSIS — F41.1 ANXIETY, GENERALIZED: ICD-10-CM

## 2023-05-05 DIAGNOSIS — E78.2 MIXED HYPERLIPIDEMIA: ICD-10-CM

## 2023-05-23 ENCOUNTER — OFFICE VISIT (OUTPATIENT)
Dept: FAMILY MEDICINE CLINIC | Facility: CLINIC | Age: 73
End: 2023-05-23
Payer: MEDICARE

## 2023-05-23 VITALS
TEMPERATURE: 97.5 F | HEART RATE: 90 BPM | OXYGEN SATURATION: 97 % | RESPIRATION RATE: 16 BRPM | HEIGHT: 65 IN | DIASTOLIC BLOOD PRESSURE: 76 MMHG | BODY MASS INDEX: 26.33 KG/M2 | WEIGHT: 158 LBS | SYSTOLIC BLOOD PRESSURE: 124 MMHG

## 2023-05-23 DIAGNOSIS — E78.2 MIXED HYPERLIPIDEMIA: ICD-10-CM

## 2023-05-23 DIAGNOSIS — R73.09 ELEVATED HEMOGLOBIN A1C: ICD-10-CM

## 2023-05-23 DIAGNOSIS — I10 BENIGN ESSENTIAL HTN: Primary | ICD-10-CM

## 2023-05-23 DIAGNOSIS — F41.1 ANXIETY, GENERALIZED: ICD-10-CM

## 2023-05-23 PROCEDURE — 99213 OFFICE O/P EST LOW 20 MIN: CPT | Performed by: NURSE PRACTITIONER

## 2023-05-23 PROCEDURE — 1159F MED LIST DOCD IN RCRD: CPT | Performed by: NURSE PRACTITIONER

## 2023-05-23 PROCEDURE — 1160F RVW MEDS BY RX/DR IN RCRD: CPT | Performed by: NURSE PRACTITIONER

## 2023-05-23 PROCEDURE — 3078F DIAST BP <80 MM HG: CPT | Performed by: NURSE PRACTITIONER

## 2023-05-23 PROCEDURE — 3074F SYST BP LT 130 MM HG: CPT | Performed by: NURSE PRACTITIONER

## 2023-05-23 NOTE — PATIENT INSTRUCTIONS
Hypertension: stable, continue Atacand 32 mg 1 tab daily and HCTZ 12.5 mg 1 tab daily.     Hyperlipidemia: stable, continue rosuvastatin 20 mg 1 tab daily. Recommend eating a heart healthy low sugar diet, to increase water intake with goal 64 oz a day and exercise 3-5 times a week, for more than 30 minutes at a time    Elevated HgA1c: rising, Patient to work on diet for 6 months, if no improvement will discuss starting medication. Patient verb. Understanding.     Anxiety: stable, no complaints/issues, continue Effexor  mg 1 tab daily

## 2023-05-23 NOTE — PROGRESS NOTES
Subjective     Teagan Cates is a 73 y.o.. female.     Patient here today for follow up on hypertension and hyperlipidemia. Patient stating she is eating some what healthy, drinking about 1 1/2 bottles of water a day and walking for exercise. Patient just coming back from vacation from [facility].       The following portions of the patient's history were reviewed and updated as appropriate: allergies, current medications, past family history, past medical history, past social history, past surgical history and problem list.    Past Medical History:   Diagnosis Date   • Allergic 1980    Sulfa   • Anxiety    • GERD (gastroesophageal reflux disease)    • Heart murmur ?   • Hernia    • Hyperlipidemia    • Hypertension        Past Surgical History:   Procedure Laterality Date   • ABDOMINOPLASTY     • BREAST SURGERY Bilateral     breast reduction   • CATARACT EXTRACTION W/ INTRAOCULAR LENS IMPLANT Bilateral    • CHOLECYSTECTOMY     • COLONOSCOPY  02/2016    normal per pt    • COLONOSCOPY N/A 12/16/2022    Procedure: COLONOSCOPY TO CECUM/TI WITH COLD SNARE POLYPECTOMIES AND RANDOM COLON BIOPSIES;  Surgeon: Julianne White MD;  Location: [facility] ENDOSCOPY;  Service: Gastroenterology;  Laterality: N/A;  pre: LOWER ABDOMINAL PAIN, CHANGE IN BOWEL  post: POLYPS, HEMORRHOIDS   • COSMETIC SURGERY  1995   • ENDOSCOPY N/A 05/02/2017    large HH, gastritis, multiple gastric polyps, nodule in duodenum   • ENDOSCOPY N/A 03/15/2021    Procedure: ESOPHAGOGASTRODUODENOSCOPY WITH COLD BX'S;  Surgeon: Julianne White MD;  Location: [facility] ENDOSCOPY;  Service: Gastroenterology;  Laterality: N/A;  pre: hx hiatal hernia, GERD  post: hiatal hernia, gastritis, gastric plyps, lymphangiectasia.    • EYE SURGERY  2015   • HYSTERECTOMY     • MOHS SURGERY  2017   • TUBAL ABDOMINAL LIGATION     • UPPER GASTROINTESTINAL ENDOSCOPY         Review of Systems   Constitutional: Negative.    HENT: Negative.    Respiratory: Negative.   "  Cardiovascular: Negative.    Gastrointestinal: Negative.    Genitourinary: Negative.    Musculoskeletal: Negative.    Neurological: Negative.    Psychiatric/Behavioral: Negative.  The patient is not nervous/anxious.        Allergies   Allergen Reactions   • Sulfa Antibiotics Anaphylaxis     anaphylaxis   • Iodinated Contrast Media Rash       Objective     Vitals:    05/23/23 0801   BP: 124/76   Pulse: 90   Resp: 16   Temp: 97.5 °F (36.4 °C)   SpO2: 97%   Weight: 71.7 kg (158 lb)   Height: 165.1 cm (65\")     Body mass index is 26.29 kg/m².    Physical Exam  Vitals reviewed.   HENT:      Head: Normocephalic.   Eyes:      Pupils: Pupils are equal, round, and reactive to light.   Neck:      Vascular: No carotid bruit.   Cardiovascular:      Rate and Rhythm: Normal rate and regular rhythm.      Pulses: Normal pulses.   Pulmonary:      Effort: Pulmonary effort is normal.      Breath sounds: Normal breath sounds.   Musculoskeletal:         General: Normal range of motion.      Right lower leg: No edema.      Left lower leg: No edema.   Skin:     General: Skin is warm and dry.   Neurological:      Mental Status: She is alert and oriented to person, place, and time.   Psychiatric:         Behavior: Behavior normal.           Current Outpatient Medications:   •  candesartan (ATACAND) 32 MG tablet, Take 1 tablet by mouth Daily., Disp: 90 tablet, Rfl: 2  •  hydroCHLOROthiazide (HYDRODIURIL) 12.5 MG tablet, TAKE 1 TABLET BY MOUTH EVERY DAY, Disp: 90 tablet, Rfl: 3  •  MAGNESIUM PO, Take  by mouth., Disp: , Rfl:   •  omeprazole (priLOSEC) 40 MG capsule, Take 1 capsule by mouth Every Morning Before Breakfast., Disp: 90 capsule, Rfl: 1  •  rosuvastatin (CRESTOR) 20 MG tablet, TAKE 1 TABLET BY MOUTH EVERY DAY, Disp: 90 tablet, Rfl: 1  •  venlafaxine XR (EFFEXOR-XR) 150 MG 24 hr capsule, TAKE 1 CAPSULE BY MOUTH EVERY DAY, Disp: 90 capsule, Rfl: 1  •  vitamin B-12 (CYANOCOBALAMIN) 1000 MCG tablet, Take 1 tablet by mouth Daily., " Disp: , Rfl:     Recent Results (from the past 2016 hour(s))   CBC & Differential    Collection Time: 05/12/23  8:51 AM    Specimen: Blood   Result Value Ref Range    WBC 7.87 3.40 - 10.80 10*3/mm3    RBC 4.46 3.77 - 5.28 10*6/mm3    Hemoglobin 13.9 12.0 - 15.9 g/dL    Hematocrit 40.8 34.0 - 46.6 %    MCV 91.5 79.0 - 97.0 fL    MCH 31.2 26.6 - 33.0 pg    MCHC 34.1 31.5 - 35.7 g/dL    RDW 12.2 (L) 12.3 - 15.4 %    Platelets 341 140 - 450 10*3/mm3    Neutrophil Rel % 52.7 42.7 - 76.0 %    Lymphocyte Rel % 33.4 19.6 - 45.3 %    Monocyte Rel % 8.9 5.0 - 12.0 %    Eosinophil Rel % 3.8 0.3 - 6.2 %    Basophil Rel % 0.8 0.0 - 1.5 %    Neutrophils Absolute 4.15 1.70 - 7.00 10*3/mm3    Lymphocytes Absolute 2.63 0.70 - 3.10 10*3/mm3    Monocytes Absolute 0.70 0.10 - 0.90 10*3/mm3    Eosinophils Absolute 0.30 0.00 - 0.40 10*3/mm3    Basophils Absolute 0.06 0.00 - 0.20 10*3/mm3    Immature Granulocyte Rel % 0.4 0.0 - 0.5 %    Immature Grans Absolute 0.03 0.00 - 0.05 10*3/mm3    nRBC 0.0 0.0 - 0.2 /100 WBC   Lipid Panel With LDL / HDL Ratio    Collection Time: 05/12/23  8:51 AM    Specimen: Blood   Result Value Ref Range    Total Cholesterol 181 0 - 200 mg/dL    Triglycerides 161 (H) 0 - 150 mg/dL    HDL Cholesterol 59 40 - 60 mg/dL    VLDL Cholesterol Joni 28 5 - 40 mg/dL    LDL Chol Calc (NIH) 94 0 - 100 mg/dL    LDL/HDL RATIO 1.52    Comprehensive Metabolic Panel    Collection Time: 05/12/23  8:51 AM    Specimen: Blood   Result Value Ref Range    Glucose 130 (H) 65 - 99 mg/dL    BUN 16 8 - 23 mg/dL    Creatinine 0.77 0.57 - 1.00 mg/dL    EGFR Result 82.1 >60.0 mL/min/1.73    BUN/Creatinine Ratio 20.8 7.0 - 25.0    Sodium 145 136 - 145 mmol/L    Potassium 3.7 3.5 - 5.2 mmol/L    Chloride 104 98 - 107 mmol/L    Total CO2 27.8 22.0 - 29.0 mmol/L    Calcium 10.2 8.6 - 10.5 mg/dL    Total Protein 7.4 6.0 - 8.5 g/dL    Albumin 4.8 3.5 - 5.2 g/dL    Globulin 2.6 gm/dL    A/G Ratio 1.8 g/dL    Total Bilirubin 0.4 0.0 - 1.2 mg/dL     Alkaline Phosphatase 57 39 - 117 U/L    AST (SGOT) 32 1 - 32 U/L    ALT (SGPT) 37 (H) 1 - 33 U/L   Hemoglobin A1c    Collection Time: 05/12/23  8:51 AM    Specimen: Blood   Result Value Ref Range    Hemoglobin A1C 6.20 (H) 4.80 - 5.60 %   Urinalysis With Culture If Indicated -    Collection Time: 05/12/23  8:51 AM    Specimen: Urine   Result Value Ref Range    Specific Gravity, UA 1.027 1.005 - 1.030    pH, UA 5.5 5.0 - 7.5    Color, UA Yellow Yellow    Appearance, UA Cloudy (A) Clear    Leukocytes, UA 1+ (A) Negative    Protein 1+ (A) Negative/Trace    Glucose, UA Negative Negative    Ketones Trace (A) Negative    Blood, UA Negative Negative    Bilirubin, UA Negative Negative    Urobilinogen, UA 1.0 0.2 - 1.0 mg/dL    Nitrite, UA Negative Negative    Microscopic Examination See below:     Urinalysis Reflex Comment    Microscopic Examination -    Collection Time: 05/12/23  8:51 AM   Result Value Ref Range    WBC, UA 6-10 (A) 0 - 5 /hpf    RBC, UA None seen 0 - 2 /hpf    Epithelial Cells (non renal) >10 (A) 0 - 10 /hpf    Casts Present (A) None seen /lpf    Cast Type Hyaline casts N/A    Crystals, UA Present (A) N/A /lpf    Crystal Type Calcium Oxalate N/A    Mucus, UA Present Not Estab. /hpf    Bacteria, UA Few None seen/Few /hpf   Urine culture, Comprehensive - ,    Collection Time: 05/12/23  8:51 AM   Result Value Ref Range    Urine Culture Final report     Result 1 Comment              Diagnoses and all orders for this visit:    1. Benign essential HTN (Primary)    2. Mixed hyperlipidemia    3. Elevated hemoglobin A1c    4. Anxiety, generalized        Patient Instructions   Hypertension: stable, continue Atacand 32 mg 1 tab daily and HCTZ 12.5 mg 1 tab daily.     Hyperlipidemia: stable, continue rosuvastatin 20 mg 1 tab daily. Recommend eating a heart healthy low sugar diet, to increase water intake with goal 64 oz a day and exercise 3-5 times a week, for more than 30 minutes at a time    Elevated HgA1c: rising,  Patient to work on diet for 6 months, if no improvement will discuss starting medication. Patient verb. Understanding.     Anxiety: stable, no complaints/issues, continue Effexor  mg 1 tab daily      Return if symptoms worsen or fail to improve.      Answers for HPI/ROS submitted by the patient on 5/22/2023  What is the primary reason for your visit?: High Blood Pressure

## 2023-05-31 RX ORDER — VENLAFAXINE HYDROCHLORIDE 150 MG/1
CAPSULE, EXTENDED RELEASE ORAL
Qty: 90 CAPSULE | Refills: 1 | Status: SHIPPED | OUTPATIENT
Start: 2023-05-31

## 2023-07-26 ENCOUNTER — TELEPHONE (OUTPATIENT)
Dept: GASTROENTEROLOGY | Facility: CLINIC | Age: 73
End: 2023-07-26
Payer: MEDICARE

## 2023-07-26 RX ORDER — OMEPRAZOLE 40 MG/1
40 CAPSULE, DELAYED RELEASE ORAL
Qty: 90 CAPSULE | Refills: 1 | Status: SHIPPED | OUTPATIENT
Start: 2023-07-26

## 2023-07-26 NOTE — TELEPHONE ENCOUNTER
PT CAME BY THE OFFICE SAID THAT SHE'S BEEN TRYING TO CALL BUT CAN'T GET THROUGH.  SHE IS NEEDING HER omeprazole REFILL.  IT WAS LAST REFILLED ON 6/29/2022 BUT SHE HAD A SCOPE DONE ON 12/16.  SHE WOULD LIKE IT SENT TO [facility].  SHE WOULD LIKE TO BE CALLED BACK.

## 2023-07-26 NOTE — TELEPHONE ENCOUNTER
Omeprazole refill e-scribed to patient's pharmacy.   Gregoria Torrez  will call patient to schedule follow up visit for December.

## 2023-08-28 ENCOUNTER — TELEPHONE (OUTPATIENT)
Dept: FAMILY MEDICINE CLINIC | Facility: CLINIC | Age: 73
End: 2023-08-28

## 2023-08-28 DIAGNOSIS — I10 BENIGN ESSENTIAL HTN: Primary | ICD-10-CM

## 2023-08-28 RX ORDER — AMLODIPINE BESYLATE 5 MG/1
5 TABLET ORAL DAILY
Qty: 30 TABLET | Refills: 0 | Status: SHIPPED | OUTPATIENT
Start: 2023-08-28

## 2023-08-28 NOTE — TELEPHONE ENCOUNTER
"  Caller: Teagan Cates \"Teagan Cates\"    Relationship to patient: Self    Best call back number: [phone]    Chief complaint: ALLERGIC REACTION, LIPS SWOLLEN AND HIVES    Patient directed to call 911 or go to their nearest emergency room.     Patient verbalized understanding: [x] Yes  [] No  If no, why?      "

## 2023-08-28 NOTE — TELEPHONE ENCOUNTER
Attempted to contact pt regarding her message, left an message to call back to give an update, pt wrote back in mychart to mariah about concerns on medications.

## 2023-08-31 RX ORDER — HYDROCHLOROTHIAZIDE 12.5 MG/1
TABLET ORAL
Qty: 90 TABLET | Refills: 3 | Status: SHIPPED | OUTPATIENT
Start: 2023-08-31

## 2023-09-13 ENCOUNTER — OFFICE VISIT (OUTPATIENT)
Dept: FAMILY MEDICINE CLINIC | Facility: CLINIC | Age: 73
End: 2023-09-13
Payer: MEDICARE

## 2023-09-13 VITALS
RESPIRATION RATE: 16 BRPM | OXYGEN SATURATION: 98 % | SYSTOLIC BLOOD PRESSURE: 138 MMHG | BODY MASS INDEX: 26.16 KG/M2 | HEIGHT: 65 IN | TEMPERATURE: 98 F | DIASTOLIC BLOOD PRESSURE: 68 MMHG | HEART RATE: 96 BPM | WEIGHT: 157 LBS

## 2023-09-13 DIAGNOSIS — T78.40XD ALLERGIC REACTION, SUBSEQUENT ENCOUNTER: ICD-10-CM

## 2023-09-13 DIAGNOSIS — I10 BENIGN ESSENTIAL HTN: Primary | ICD-10-CM

## 2023-09-13 PROBLEM — T78.40XA ALLERGIC REACTION: Status: ACTIVE | Noted: 2023-09-13

## 2023-09-13 RX ORDER — FAMOTIDINE 20 MG/1
20 TABLET, FILM COATED ORAL
COMMUNITY
Start: 2023-08-28

## 2023-09-13 RX ORDER — AMLODIPINE BESYLATE 10 MG/1
10 TABLET ORAL DAILY
Qty: 90 TABLET | Refills: 1 | Status: SHIPPED | OUTPATIENT
Start: 2023-09-13 | End: 2023-09-18 | Stop reason: SDUPTHER

## 2023-09-13 NOTE — PATIENT INSTRUCTIONS
Increasing amlodipine from 5 to 10 mg daily; Patient to keep checking b/p daily and letting provider know if sb/p continues to stay in the 140's-150's. Patient informed of potential side effects of amlodipine and informed to call/rto if having any.  Patient to keep upcomming appts.

## 2023-09-13 NOTE — PROGRESS NOTES
Subjective   Teagan Cates is a 73 y.o. female. Patient is here today for   Chief Complaint   Patient presents with    Hospital Follow Up Visit            Vitals:    09/13/23 1303   BP: 138/68; 130/70   Pulse: 96   Resp: 16   Temp: 98 °F (36.7 °C)   SpO2: 98%     The following portions of the patient's history were reviewed and updated as appropriate: allergies, current medications, past family history, past medical history, past social history, past surgical history and problem list.    Past Medical History:   Diagnosis Date    Allergic 1980    Sulfa    Anxiety     GERD (gastroesophageal reflux disease)     Heart murmur ?    Hernia     Hyperlipidemia     Hypertension       Allergies   Allergen Reactions    Sulfa Antibiotics Anaphylaxis     anaphylaxis    Atacand [Candesartan] Hives    Iodinated Contrast Media Rash      Social History     Socioeconomic History    Marital status:    Tobacco Use    Smoking status: Never    Smokeless tobacco: Never   Vaping Use    Vaping Use: Never used   Substance and Sexual Activity    Alcohol use: Yes     Alcohol/week: 20.0 standard drinks     Types: 20 Glasses of wine per week     Comment: SOCIAL    Drug use: No    Sexual activity: Yes     Partners: Male     Birth control/protection: Post-menopausal        Current Outpatient Medications:     amLODIPine (NORVASC) 10 MG tablet, Take 1 tablet by mouth Daily., Disp: 90 tablet, Rfl: 1    hydroCHLOROthiazide (HYDRODIURIL) 12.5 MG tablet, TAKE 1 TABLET BY MOUTH EVERY DAY, Disp: 90 tablet, Rfl: 3    MAGNESIUM PO, Take  by mouth., Disp: , Rfl:     omeprazole (priLOSEC) 40 MG capsule, Take 1 capsule by mouth Every Morning Before Breakfast., Disp: 90 capsule, Rfl: 1    rosuvastatin (CRESTOR) 20 MG tablet, TAKE 1 TABLET BY MOUTH EVERY DAY, Disp: 90 tablet, Rfl: 1    venlafaxine XR (EFFEXOR-XR) 150 MG 24 hr capsule, TAKE 1 CAPSULE BY MOUTH EVERY DAY, Disp: 90 capsule, Rfl: 1    vitamin B-12 (CYANOCOBALAMIN) 1000 MCG tablet, Take 1  tablet by mouth Daily., Disp: , Rfl:     famotidine (PEPCID) 20 MG tablet, Take 1 tablet by mouth. (Patient not taking: Reported on 9/13/2023), Disp: , Rfl:      Objective     History of Present Illness  Patient here today for follow up from ER visit on 8/28 for c/o hives and lip swelling for 3 days after getting Botox. Patient had been taking Benadryl at home with minor relief. Patient diagnosed with allergic reaction and thought to be brought on by her Atacand script. Patient instructed to complete full course of prednisone and to stop Atacand; to use benadryl and Pepcid twice a day as needed for rash. Patient called office and was informed to continue the HCTZ 12.5 mg 1 tab daily; Started on amlodipine 5mg 1 tab daily; to check your b/p at least once a day.     Patient stating her home b/p's  have been running around 121-152/69-82; Patient denies any side effects/adverse effects with amlodipine at this time. Patient stating all rash has resolved now.      Review of Systems   Constitutional: Negative.    Respiratory: Negative.  Negative for shortness of breath.    Cardiovascular: Negative.  Negative for chest pain, palpitations and leg swelling.   Skin: Negative.  Negative for rash.     Physical Exam  Vitals reviewed.   HENT:      Head: Normocephalic.   Eyes:      Pupils: Pupils are equal, round, and reactive to light.   Cardiovascular:      Rate and Rhythm: Normal rate and regular rhythm.      Pulses: Normal pulses.   Pulmonary:      Effort: Pulmonary effort is normal.      Breath sounds: Normal breath sounds.   Musculoskeletal:         General: Normal range of motion.      Right lower leg: No edema.      Left lower leg: No edema.   Skin:     General: Skin is warm and dry.   Neurological:      Mental Status: She is alert and oriented to person, place, and time.   Psychiatric:         Behavior: Behavior normal.       ASSESSMENT     Problems Addressed this Visit          Allergies and Adverse Reactions    Allergic  reaction       Cardiac and Vasculature    Benign essential HTN - Primary    Relevant Medications    amLODIPine (NORVASC) 10 MG tablet     Diagnoses         Codes Comments    Benign essential HTN    -  Primary ICD-10-CM: I10  ICD-9-CM: 401.1     Allergic reaction, subsequent encounter     ICD-10-CM: T78.40XD  ICD-9-CM: V58.89, 995.3 believed to be due to atacand script            Current outpatient and discharge medications have been reconciled for the patient.  Reviewed by: MARISOL Holley      PLAN    Patient Instructions   Increasing amlodipine from 5 to 10 mg daily; Patient to keep checking b/p daily and letting provider know if sb/p continues to stay in the 140's-150's. Patient informed of potential side effects of amlodipine and informed to call/rto if having any.  Patient to keep upcomming appts.   Return if symptoms worsen or fail to improve, for keep appt in December.

## 2023-09-18 DIAGNOSIS — I10 BENIGN ESSENTIAL HTN: Primary | ICD-10-CM

## 2023-09-18 RX ORDER — AMLODIPINE BESYLATE 5 MG/1
5 TABLET ORAL DAILY
Qty: 30 TABLET | Refills: 0 | Status: SHIPPED | OUTPATIENT
Start: 2023-09-18 | End: 2023-09-20

## 2023-09-18 RX ORDER — LISINOPRIL 5 MG/1
5 TABLET ORAL DAILY
Qty: 30 TABLET | Refills: 0 | Status: SHIPPED | OUTPATIENT
Start: 2023-09-18

## 2023-09-20 DIAGNOSIS — I10 BENIGN ESSENTIAL HTN: ICD-10-CM

## 2023-09-20 RX ORDER — AMLODIPINE BESYLATE 5 MG/1
TABLET ORAL
Qty: 30 TABLET | Refills: 0 | Status: SHIPPED | OUTPATIENT
Start: 2023-09-20

## 2023-09-29 DIAGNOSIS — I10 BENIGN ESSENTIAL HTN: Primary | ICD-10-CM

## 2023-09-29 RX ORDER — LISINOPRIL 10 MG/1
10 TABLET ORAL DAILY
Qty: 90 TABLET | Refills: 0 | Status: SHIPPED | OUTPATIENT
Start: 2023-09-29

## 2023-10-12 DIAGNOSIS — E78.2 MIXED HYPERLIPIDEMIA: ICD-10-CM

## 2023-10-12 DIAGNOSIS — I10 BENIGN ESSENTIAL HTN: ICD-10-CM

## 2023-10-12 RX ORDER — CANDESARTAN 32 MG/1
32 TABLET ORAL DAILY
Qty: 90 TABLET | Refills: 2 | OUTPATIENT
Start: 2023-10-12

## 2023-10-12 RX ORDER — ROSUVASTATIN CALCIUM 20 MG/1
TABLET, COATED ORAL
Qty: 90 TABLET | Refills: 1 | Status: SHIPPED | OUTPATIENT
Start: 2023-10-12

## 2023-10-12 NOTE — TELEPHONE ENCOUNTER
Rx Refill Note  Requested Prescriptions     Pending Prescriptions Disp Refills    candesartan (ATACAND) 32 MG tablet [Pharmacy Med Name: CANDESARTAN CILEXETIL 32 MG TB] 90 tablet 2     Sig: TAKE 1 TABLET BY MOUTH EVERY DAY     Signed Prescriptions Disp Refills    rosuvastatin (CRESTOR) 20 MG tablet 90 tablet 1     Sig: TAKE 1 TABLET BY MOUTH EVERY DAY     Authorizing Provider: WILFREDO STERLING     Ordering User: NAI SOSA      Last office visit with prescribing clinician: 9/13/2023   Last telemedicine visit with prescribing clinician: Visit date not found   Next office visit with prescribing clinician: 12/8/2023                         Would you like a call back once the refill request has been completed: [] Yes [] No    If the office needs to give you a call back, can they leave a voicemail: [] Yes [] No    Shilpi Sparks MA  10/12/23, 07:42 EDT

## 2023-10-18 DIAGNOSIS — I10 BENIGN ESSENTIAL HTN: ICD-10-CM

## 2023-10-18 RX ORDER — AMLODIPINE BESYLATE 5 MG/1
5 TABLET ORAL DAILY
Qty: 90 TABLET | Refills: 1 | Status: SHIPPED | OUTPATIENT
Start: 2023-10-18

## 2023-10-18 NOTE — TELEPHONE ENCOUNTER
Rx Refill Note  Requested Prescriptions     Pending Prescriptions Disp Refills    amLODIPine (NORVASC) 5 MG tablet 30 tablet 0     Sig: Take 1 tablet by mouth Daily.      Last office visit with prescribing clinician: 9/13/2023   Last telemedicine visit with prescribing clinician: Visit date not found   Next office visit with prescribing clinician: 12/8/2023                         Would you like a call back once the refill request has been completed: [] Yes [] No    If the office needs to give you a call back, can they leave a voicemail: [] Yes [] No    Shilpi Sparks MA  10/18/23, 13:18 EDT

## 2023-11-01 ENCOUNTER — OFFICE VISIT (OUTPATIENT)
Dept: FAMILY MEDICINE CLINIC | Facility: CLINIC | Age: 73
End: 2023-11-01
Payer: MEDICARE

## 2023-11-01 VITALS
WEIGHT: 152.12 LBS | SYSTOLIC BLOOD PRESSURE: 136 MMHG | TEMPERATURE: 98 F | RESPIRATION RATE: 16 BRPM | HEIGHT: 65 IN | OXYGEN SATURATION: 99 % | DIASTOLIC BLOOD PRESSURE: 72 MMHG | BODY MASS INDEX: 25.34 KG/M2 | HEART RATE: 101 BPM

## 2023-11-01 DIAGNOSIS — I10 BENIGN ESSENTIAL HTN: Primary | ICD-10-CM

## 2023-11-01 LAB
BUN SERPL-MCNC: 11 MG/DL (ref 8–23)
BUN/CREAT SERPL: 17.5 (ref 7–25)
CALCIUM SERPL-MCNC: 10.9 MG/DL (ref 8.6–10.5)
CHLORIDE SERPL-SCNC: 95 MMOL/L (ref 98–107)
CO2 SERPL-SCNC: 30.6 MMOL/L (ref 22–29)
CREAT SERPL-MCNC: 0.63 MG/DL (ref 0.57–1)
EGFRCR SERPLBLD CKD-EPI 2021: 94.4 ML/MIN/1.73
GLUCOSE SERPL-MCNC: 105 MG/DL (ref 65–99)
POTASSIUM SERPL-SCNC: 3.5 MMOL/L (ref 3.5–5.2)
SODIUM SERPL-SCNC: 140 MMOL/L (ref 136–145)

## 2023-11-01 PROCEDURE — 1159F MED LIST DOCD IN RCRD: CPT | Performed by: NURSE PRACTITIONER

## 2023-11-01 PROCEDURE — 3075F SYST BP GE 130 - 139MM HG: CPT | Performed by: NURSE PRACTITIONER

## 2023-11-01 PROCEDURE — 3078F DIAST BP <80 MM HG: CPT | Performed by: NURSE PRACTITIONER

## 2023-11-01 PROCEDURE — 1160F RVW MEDS BY RX/DR IN RCRD: CPT | Performed by: NURSE PRACTITIONER

## 2023-11-01 PROCEDURE — 99214 OFFICE O/P EST MOD 30 MIN: CPT | Performed by: NURSE PRACTITIONER

## 2023-11-01 RX ORDER — NIFEDIPINE 30 MG/1
30 TABLET, EXTENDED RELEASE ORAL DAILY
Qty: 30 TABLET | Refills: 1 | Status: SHIPPED | OUTPATIENT
Start: 2023-11-01

## 2023-11-01 NOTE — PATIENT INSTRUCTIONS
Hypertension: stopping amlodipine due to swelling and not covering b/p well enough. Continue HCTZ 25 mg daily at this time, hoping to go back down to 12.5 mg. Will not do ACE/ARB's due to reaction to Atcand and lisinopril. Starting on Procardia XL 30 mg daily. Patient to check b/p twice a day for 1 week then send a [employer] message of b/p results. Patient informed of s/s to monitor for for possible side effects. Patient verb. Understanding. Ordering bmp today for check on potassium and sodium.

## 2023-11-01 NOTE — PROGRESS NOTES
Answers submitted by the patient for this visit:  Primary Reason for Visit (Submitted on 10/31/2023)  What is the primary reason for your visit?: High Blood Pressure  Subjective     Teagan Cates is a 73 y.o.. female.     Patient here today for follow up on hypertension. Patient has had a recent history of having reaction to long term use of Atacand; then having cough with lisinopril and having swelling of ankles with amlodipine. Patient currently taking hctz 12.5 mg twice a day and amlodipine 5 mg bid. Patient stating she is getting up once at night for urination. Patient stating her home b/p readings have been 128-146/64-79.       The following portions of the patient's history were reviewed and updated as appropriate: allergies, current medications, past family history, past medical history, past social history, past surgical history and problem list.    Past Medical History:   Diagnosis Date    Allergic 1980    Sulfa    Anxiety     GERD (gastroesophageal reflux disease)     Heart murmur ?    Hernia     Hyperlipidemia     Hypertension        Past Surgical History:   Procedure Laterality Date    ABDOMINOPLASTY      BREAST SURGERY Bilateral     breast reduction    CATARACT EXTRACTION W/ INTRAOCULAR LENS IMPLANT Bilateral     CHOLECYSTECTOMY      COLONOSCOPY  02/2016    normal per pt     COLONOSCOPY N/A 12/16/2022    Procedure: COLONOSCOPY TO CECUM/TI WITH COLD SNARE POLYPECTOMIES AND RANDOM COLON BIOPSIES;  Surgeon: Julianne White MD;  Location: [facility] ENDOSCOPY;  Service: Gastroenterology;  Laterality: N/A;  pre: LOWER ABDOMINAL PAIN, CHANGE IN BOWEL  post: POLYPS, HEMORRHOIDS    COSMETIC SURGERY  1995    ENDOSCOPY N/A 05/02/2017    large HH, gastritis, multiple gastric polyps, nodule in duodenum    ENDOSCOPY N/A 03/15/2021    Procedure: ESOPHAGOGASTRODUODENOSCOPY WITH COLD BX'S;  Surgeon: Julianne White MD;  Location: [facility] ENDOSCOPY;  Service: Gastroenterology;  Laterality: N/A;  pre: hx hiatal  "hernia, GERD  post: hiatal hernia, gastritis, gastric plyps, lymphangiectasia.     EYE SURGERY  2015    HYSTERECTOMY      MOHS SURGERY  2017    TUBAL ABDOMINAL LIGATION      UPPER GASTROINTESTINAL ENDOSCOPY         Review of Systems   Constitutional: Negative.    Respiratory: Negative.     Cardiovascular:  Positive for leg swelling. Negative for chest pain and palpitations.   Gastrointestinal: Negative.  Negative for nausea.   Neurological: Negative.  Negative for dizziness and headaches.       Allergies   Allergen Reactions    Sulfa Antibiotics Anaphylaxis     anaphylaxis    Atacand [Candesartan] Hives    Iodinated Contrast Media Rash       Objective     Vitals:    11/01/23 0900   BP: 136/72; 140/70   Pulse: 101;    Resp: 16   Temp: 98 °F (36.7 °C)   SpO2: 99%   Weight: 69 kg (152 lb 1.9 oz)   Height: 165.1 cm (65\")     Body mass index is 25.31 kg/m².    Physical Exam  Vitals reviewed.   HENT:      Head: Normocephalic.   Eyes:      Pupils: Pupils are equal, round, and reactive to light.   Neck:      Vascular: No carotid bruit.   Cardiovascular:      Rate and Rhythm: Normal rate and regular rhythm.   Pulmonary:      Effort: Pulmonary effort is normal.      Breath sounds: Normal breath sounds.   Musculoskeletal:         General: Normal range of motion.      Right lower leg: No edema.      Left lower leg: No edema.   Lymphadenopathy:      Cervical: No cervical adenopathy.   Skin:     General: Skin is warm and dry.   Neurological:      Mental Status: She is alert and oriented to person, place, and time.   Psychiatric:         Behavior: Behavior normal.           Current Outpatient Medications:     hydroCHLOROthiazide (HYDRODIURIL) 12.5 MG tablet, TAKE 1 TABLET BY MOUTH EVERY DAY, Disp: 90 tablet, Rfl: 3    MAGNESIUM PO, Take  by mouth., Disp: , Rfl:     omeprazole (priLOSEC) 40 MG capsule, Take 1 capsule by mouth Every Morning Before Breakfast., Disp: 90 capsule, Rfl: 1    rosuvastatin (CRESTOR) 20 MG tablet, TAKE 1 " TABLET BY MOUTH EVERY DAY, Disp: 90 tablet, Rfl: 1    venlafaxine XR (EFFEXOR-XR) 150 MG 24 hr capsule, TAKE 1 CAPSULE BY MOUTH EVERY DAY, Disp: 90 capsule, Rfl: 1    vitamin B-12 (CYANOCOBALAMIN) 1000 MCG tablet, Take 1 tablet by mouth Daily., Disp: , Rfl:     NIFEdipine XL (Procardia XL) 30 MG 24 hr tablet, Take 1 tablet by mouth Daily., Disp: 30 tablet, Rfl: 1      Diagnoses and all orders for this visit:    1. Benign essential HTN (Primary)  -     NIFEdipine XL (Procardia XL) 30 MG 24 hr tablet; Take 1 tablet by mouth Daily.  Dispense: 30 tablet; Refill: 1  -     Basic metabolic panel      Patient Instructions   Hypertension: stopping amlodipine due to swelling and not covering b/p well enough. Continue HCTZ 25 mg daily at this time, hoping to go back down to 12.5 mg. Will not do ACE/ARB's due to reaction to Atcand and lisinopril. Starting on Procardia XL 30 mg daily. Patient to check b/p twice a day for 1 week then send a [email or web address] message of b/p results. Patient informed of s/s to monitor for for possible side effects. Patient verb. Understanding. Ordering [facility] today for check on potassium and sodium.     Return for my chart message in 1 week; keep December appt. .

## 2023-11-06 ENCOUNTER — TELEPHONE (OUTPATIENT)
Dept: FAMILY MEDICINE CLINIC | Facility: CLINIC | Age: 73
End: 2023-11-06

## 2023-11-06 DIAGNOSIS — I10 BENIGN ESSENTIAL HTN: ICD-10-CM

## 2023-11-06 RX ORDER — NIFEDIPINE 30 MG/1
30 TABLET, EXTENDED RELEASE ORAL DAILY
Qty: 30 TABLET | Refills: 1 | Status: CANCELLED | OUTPATIENT
Start: 2023-11-06

## 2023-11-06 NOTE — TELEPHONE ENCOUNTER
"PHARMACY IS NOT ABLE TO PROCESS HER MEDICATION FROM 11/1, WOULD LIKE MEDICATION SENT TO PHARMACY LISTED     Caller: Teagan Cates \"Teagan Cates\"    Relationship: Self    Best call back number: [phone]    Requested Prescriptions:   Requested Prescriptions     Pending Prescriptions Disp Refills    NIFEdipine XL (Procardia XL) 30 MG 24 hr tablet 30 tablet 1     Sig: Take 1 tablet by mouth Daily.        Pharmacy where request should be sent: [facility]/PHARMACY #[id number] [facility] [address] [address] AT [facility] [phone] [facility] [phone]      Last office visit with prescribing clinician: 11/1/2023   Last telemedicine visit with prescribing clinician: Visit date not found   Next office visit with prescribing clinician: 12/8/2023     Additional details provided by patient: PHARMACY IS NOT ABLE TO PROCESS HER MEDICATION FROM 11/1, WOULD LIKE MEDICATION SENT TO PHARMACY LISTED     Does the patient have less than a 3 day supply:  [x] Yes  [] No    Would you like a call back once the refill request has been completed: [] Yes [] No    If the office needs to give you a call back, can they leave a voicemail: [] Yes [] No    Francisco J Butt Rep   11/06/23 14:31 EST           "

## 2023-11-07 DIAGNOSIS — I10 BENIGN ESSENTIAL HTN: ICD-10-CM

## 2023-11-07 RX ORDER — NIFEDIPINE 30 MG/1
30 TABLET, EXTENDED RELEASE ORAL DAILY
Qty: 30 TABLET | Refills: 1 | Status: SHIPPED | OUTPATIENT
Start: 2023-11-07

## 2023-11-13 DIAGNOSIS — I10 BENIGN ESSENTIAL HTN: Primary | ICD-10-CM

## 2023-11-13 RX ORDER — HYDROCHLOROTHIAZIDE 25 MG/1
25 TABLET ORAL DAILY
Qty: 90 TABLET | Refills: 0 | Status: SHIPPED | OUTPATIENT
Start: 2023-11-13

## 2023-12-01 RX ORDER — VENLAFAXINE HYDROCHLORIDE 150 MG/1
CAPSULE, EXTENDED RELEASE ORAL
Qty: 90 CAPSULE | Refills: 1 | Status: SHIPPED | OUTPATIENT
Start: 2023-12-01

## 2023-12-06 DIAGNOSIS — R73.09 ELEVATED HEMOGLOBIN A1C: ICD-10-CM

## 2023-12-06 DIAGNOSIS — E55.9 VITAMIN D DEFICIENCY: ICD-10-CM

## 2023-12-06 DIAGNOSIS — Z13.29 SCREENING FOR THYROID DISORDER: ICD-10-CM

## 2023-12-06 DIAGNOSIS — Z00.00 MEDICARE ANNUAL WELLNESS VISIT, SUBSEQUENT: Primary | ICD-10-CM

## 2023-12-06 DIAGNOSIS — Z00.00 ROUTINE MEDICAL EXAM: Primary | ICD-10-CM

## 2023-12-06 LAB
BILIRUB BLD-MCNC: NEGATIVE MG/DL
CLARITY, POC: ABNORMAL
COLOR UR: YELLOW
EXPIRATION DATE: ABNORMAL
GLUCOSE UR STRIP-MCNC: NEGATIVE MG/DL
KETONES UR QL: NEGATIVE
LEUKOCYTE EST, POC: ABNORMAL
Lab: ABNORMAL
NITRITE UR-MCNC: NEGATIVE MG/ML
PH UR: 5 [PH] (ref 5–8)
PROT UR STRIP-MCNC: ABNORMAL MG/DL
RBC # UR STRIP: ABNORMAL /UL
SP GR UR: 1.03 (ref 1–1.03)
UROBILINOGEN UR QL: NORMAL

## 2023-12-06 PROCEDURE — 81003 URINALYSIS AUTO W/O SCOPE: CPT | Performed by: NURSE PRACTITIONER

## 2023-12-07 LAB
25(OH)D3+25(OH)D2 SERPL-MCNC: 43.7 NG/ML (ref 30–100)
ALBUMIN SERPL-MCNC: 5 G/DL (ref 3.5–5.2)
ALBUMIN/GLOB SERPL: 2.1 G/DL
ALP SERPL-CCNC: 55 U/L (ref 39–117)
ALT SERPL-CCNC: 47 U/L (ref 1–33)
AST SERPL-CCNC: 38 U/L (ref 1–32)
BASOPHILS # BLD AUTO: 0.07 10*3/MM3 (ref 0–0.2)
BASOPHILS NFR BLD AUTO: 0.6 % (ref 0–1.5)
BILIRUB SERPL-MCNC: 0.4 MG/DL (ref 0–1.2)
BUN SERPL-MCNC: 12 MG/DL (ref 8–23)
BUN/CREAT SERPL: 18.2 (ref 7–25)
CALCIUM SERPL-MCNC: 10.2 MG/DL (ref 8.6–10.5)
CHLORIDE SERPL-SCNC: 101 MMOL/L (ref 98–107)
CHOLEST SERPL-MCNC: 166 MG/DL (ref 0–200)
CO2 SERPL-SCNC: 26.2 MMOL/L (ref 22–29)
CREAT SERPL-MCNC: 0.66 MG/DL (ref 0.57–1)
EGFRCR SERPLBLD CKD-EPI 2021: 93.3 ML/MIN/1.73
EOSINOPHIL # BLD AUTO: 0.36 10*3/MM3 (ref 0–0.4)
EOSINOPHIL NFR BLD AUTO: 3.1 % (ref 0.3–6.2)
ERYTHROCYTE [DISTWIDTH] IN BLOOD BY AUTOMATED COUNT: 12.1 % (ref 12.3–15.4)
GLOBULIN SER CALC-MCNC: 2.4 GM/DL
GLUCOSE SERPL-MCNC: 135 MG/DL (ref 65–99)
HBA1C MFR BLD: 6.3 % (ref 4.8–5.6)
HCT VFR BLD AUTO: 41.9 % (ref 34–46.6)
HDLC SERPL-MCNC: 64 MG/DL (ref 40–60)
HGB BLD-MCNC: 14.1 G/DL (ref 12–15.9)
IMM GRANULOCYTES # BLD AUTO: 0.04 10*3/MM3 (ref 0–0.05)
IMM GRANULOCYTES NFR BLD AUTO: 0.3 % (ref 0–0.5)
LDLC SERPL CALC-MCNC: 85 MG/DL (ref 0–100)
LDLC/HDLC SERPL: 1.3 {RATIO}
LYMPHOCYTES # BLD AUTO: 3.34 10*3/MM3 (ref 0.7–3.1)
LYMPHOCYTES NFR BLD AUTO: 28.7 % (ref 19.6–45.3)
MCH RBC QN AUTO: 29.7 PG (ref 26.6–33)
MCHC RBC AUTO-ENTMCNC: 33.7 G/DL (ref 31.5–35.7)
MCV RBC AUTO: 88.2 FL (ref 79–97)
MONOCYTES # BLD AUTO: 0.71 10*3/MM3 (ref 0.1–0.9)
MONOCYTES NFR BLD AUTO: 6.1 % (ref 5–12)
NEUTROPHILS # BLD AUTO: 7.12 10*3/MM3 (ref 1.7–7)
NEUTROPHILS NFR BLD AUTO: 61.2 % (ref 42.7–76)
NRBC BLD AUTO-RTO: 0 /100 WBC (ref 0–0.2)
PLATELET # BLD AUTO: 417 10*3/MM3 (ref 140–450)
POTASSIUM SERPL-SCNC: 3.6 MMOL/L (ref 3.5–5.2)
PROT SERPL-MCNC: 7.4 G/DL (ref 6–8.5)
RBC # BLD AUTO: 4.75 10*6/MM3 (ref 3.77–5.28)
SODIUM SERPL-SCNC: 142 MMOL/L (ref 136–145)
TRIGL SERPL-MCNC: 94 MG/DL (ref 0–150)
TSH SERPL DL<=0.005 MIU/L-ACNC: 2.22 UIU/ML (ref 0.27–4.2)
VLDLC SERPL CALC-MCNC: 17 MG/DL (ref 5–40)
WBC # BLD AUTO: 11.64 10*3/MM3 (ref 3.4–10.8)

## 2023-12-08 ENCOUNTER — OFFICE VISIT (OUTPATIENT)
Dept: FAMILY MEDICINE CLINIC | Facility: CLINIC | Age: 73
End: 2023-12-08
Payer: MEDICARE

## 2023-12-08 VITALS
HEART RATE: 92 BPM | TEMPERATURE: 98.1 F | SYSTOLIC BLOOD PRESSURE: 136 MMHG | WEIGHT: 149.91 LBS | DIASTOLIC BLOOD PRESSURE: 72 MMHG | OXYGEN SATURATION: 95 % | HEIGHT: 65 IN | BODY MASS INDEX: 24.98 KG/M2 | RESPIRATION RATE: 16 BRPM

## 2023-12-08 DIAGNOSIS — E78.2 MIXED HYPERLIPIDEMIA: ICD-10-CM

## 2023-12-08 DIAGNOSIS — R82.81 PYURIA: ICD-10-CM

## 2023-12-08 DIAGNOSIS — I10 BENIGN ESSENTIAL HTN: ICD-10-CM

## 2023-12-08 DIAGNOSIS — F41.1 ANXIETY, GENERALIZED: ICD-10-CM

## 2023-12-08 DIAGNOSIS — Z00.00 MEDICARE ANNUAL WELLNESS VISIT, SUBSEQUENT: Primary | ICD-10-CM

## 2023-12-08 DIAGNOSIS — R73.03 PREDIABETES: ICD-10-CM

## 2023-12-08 RX ORDER — AMOXICILLIN AND CLAVULANATE POTASSIUM 500; 125 MG/1; MG/1
1 TABLET, FILM COATED ORAL 2 TIMES DAILY
Qty: 10 TABLET | Refills: 0 | Status: SHIPPED | OUTPATIENT
Start: 2023-12-08 | End: 2023-12-13

## 2023-12-08 RX ORDER — METFORMIN HYDROCHLORIDE 500 MG/1
500 TABLET, EXTENDED RELEASE ORAL
Qty: 90 TABLET | Refills: 1 | Status: SHIPPED | OUTPATIENT
Start: 2023-12-08

## 2023-12-08 RX ORDER — NIFEDIPINE 30 MG/1
30 TABLET, EXTENDED RELEASE ORAL DAILY
Qty: 90 TABLET | Refills: 1 | Status: SHIPPED | OUTPATIENT
Start: 2023-12-08

## 2023-12-08 NOTE — PROGRESS NOTES
The ABCs of the Annual Wellness Visit  Subsequent Medicare Wellness Visit    Subjective    Teagan Cates is a 73 y.o. female who presents for a Subsequent Medicare Wellness Visit.    The following portions of the patient's history were reviewed and   updated as appropriate: allergies, current medications, past family history, past medical history, past social history, past surgical history, and problem list.    Compared to one year ago, the patient feels her physical   health is the same.    Compared to one year ago, the patient feels her mental   health is the same.    Recent Hospitalizations:  She was not admitted to the hospital during the last year.       Current Medical Providers:  Patient Care Team:  Mary Urbano APRN as PCP - General (Family Medicine)    Outpatient Medications Prior to Visit   Medication Sig Dispense Refill    hydroCHLOROthiazide (HYDRODIURIL) 25 MG tablet Take 1 tablet by mouth Daily. 90 tablet 0    MAGNESIUM PO Take  by mouth.      omeprazole (priLOSEC) 40 MG capsule Take 1 capsule by mouth Every Morning Before Breakfast. 90 capsule 1    rosuvastatin (CRESTOR) 20 MG tablet TAKE 1 TABLET BY MOUTH EVERY DAY 90 tablet 1    venlafaxine XR (EFFEXOR-XR) 150 MG 24 hr capsule TAKE 1 CAPSULE BY MOUTH EVERY DAY 90 capsule 1    vitamin B-12 (CYANOCOBALAMIN) 1000 MCG tablet Take 1 tablet by mouth Daily.      NIFEdipine XL (Procardia XL) 30 MG 24 hr tablet Take 1 tablet by mouth Daily. 30 tablet 1     No facility-administered medications prior to visit.       No opioid medication identified on active medication list. I have reviewed chart for other potential  high risk medication/s and harmful drug interactions in the elderly.        Aspirin is not on active medication list.  Aspirin use is not indicated based on review of current medical condition/s. Risk of harm outweighs potential benefits.  .    Patient Active Problem List   Diagnosis    Benign essential HTN    HLD (hyperlipidemia)    Acid  "reflux    Anxiety, generalized    Heart murmur, systolic    HH (hiatus hernia)    Gastroesophageal reflux disease without esophagitis    Hiatal hernia    Pedro's esophagus without dysplasia    Tenosynovitis, de Quervain    Elevated hemoglobin A1c    Change in bowel habits    Allergic reaction    Prediabetes     Advance Care Planning   Advance Care Planning     Advance Directive is not on file.  ACP discussion was held with the patient during this visit. Patient does not have an advance directive, declines further assistance.     Objective    Vitals:    12/08/23 0812   BP: 136/72; 130/78   Pulse: 92   Resp: 16   Temp: 98.1 °F (36.7 °C)   SpO2: 95%   Weight: 68 kg (149 lb 14.6 oz)   Height: 165.1 cm (65\")     Estimated body mass index is 24.95 kg/m² as calculated from the following:    Height as of this encounter: 165.1 cm (65\").    Weight as of this encounter: 68 kg (149 lb 14.6 oz).    BMI is within normal parameters. No other follow-up for BMI required.      Does the patient have evidence of cognitive impairment? No    Recent Results (from the past 336 hour(s))   CBC & Differential    Collection Time: 12/06/23  8:11 AM    Specimen: Blood   Result Value Ref Range    WBC 11.64 (H) 3.40 - 10.80 10*3/mm3    RBC 4.75 3.77 - 5.28 10*6/mm3    Hemoglobin 14.1 12.0 - 15.9 g/dL    Hematocrit 41.9 34.0 - 46.6 %    MCV 88.2 79.0 - 97.0 fL    MCH 29.7 26.6 - 33.0 pg    MCHC 33.7 31.5 - 35.7 g/dL    RDW 12.1 (L) 12.3 - 15.4 %    Platelets 417 140 - 450 10*3/mm3    Neutrophil Rel % 61.2 42.7 - 76.0 %    Lymphocyte Rel % 28.7 19.6 - 45.3 %    Monocyte Rel % 6.1 5.0 - 12.0 %    Eosinophil Rel % 3.1 0.3 - 6.2 %    Basophil Rel % 0.6 0.0 - 1.5 %    Neutrophils Absolute 7.12 (H) 1.70 - 7.00 10*3/mm3    Lymphocytes Absolute 3.34 (H) 0.70 - 3.10 10*3/mm3    Monocytes Absolute 0.71 0.10 - 0.90 10*3/mm3    Eosinophils Absolute 0.36 0.00 - 0.40 10*3/mm3    Basophils Absolute 0.07 0.00 - 0.20 10*3/mm3    Immature Granulocyte Rel % 0.3 0.0 " - 0.5 %    Immature Grans Absolute 0.04 0.00 - 0.05 10*3/mm3    nRBC 0.0 0.0 - 0.2 /100 WBC   Comprehensive Metabolic Panel    Collection Time: 12/06/23  8:11 AM    Specimen: Blood   Result Value Ref Range    Glucose 135 (H) 65 - 99 mg/dL    BUN 12 8 - 23 mg/dL    Creatinine 0.66 0.57 - 1.00 mg/dL    EGFR Result 93.3 >60.0 mL/min/1.73    BUN/Creatinine Ratio 18.2 7.0 - 25.0    Sodium 142 136 - 145 mmol/L    Potassium 3.6 3.5 - 5.2 mmol/L    Chloride 101 98 - 107 mmol/L    Total CO2 26.2 22.0 - 29.0 mmol/L    Calcium 10.2 8.6 - 10.5 mg/dL    Total Protein 7.4 6.0 - 8.5 g/dL    Albumin 5.0 3.5 - 5.2 g/dL    Globulin 2.4 gm/dL    A/G Ratio 2.1 g/dL    Total Bilirubin 0.4 0.0 - 1.2 mg/dL    Alkaline Phosphatase 55 39 - 117 U/L    AST (SGOT) 38 (H) 1 - 32 U/L    ALT (SGPT) 47 (H) 1 - 33 U/L   Lipid Panel With LDL / HDL Ratio    Collection Time: 12/06/23  8:11 AM    Specimen: Blood   Result Value Ref Range    Total Cholesterol 166 0 - 200 mg/dL    Triglycerides 94 0 - 150 mg/dL    HDL Cholesterol 64 (H) 40 - 60 mg/dL    VLDL Cholesterol Joni 17 5 - 40 mg/dL    LDL Chol Calc (NIH) 85 0 - 100 mg/dL    LDL/HDL RATIO 1.30    TSH Rfx On Abnormal To Free T4    Collection Time: 12/06/23  8:11 AM    Specimen: Blood   Result Value Ref Range    TSH 2.220 0.270 - 4.200 uIU/mL   Hemoglobin A1c    Collection Time: 12/06/23  8:11 AM    Specimen: Blood   Result Value Ref Range    Hemoglobin A1C 6.30 (H) 4.80 - 5.60 %   Vitamin D,25-Hydroxy    Collection Time: 12/06/23  8:11 AM    Specimen: Blood   Result Value Ref Range    25 Hydroxy, Vitamin D 43.7 30.0 - 100.0 ng/ml   POC Urinalysis Dipstick, Automated    Collection Time: 12/06/23 12:02 PM    Specimen: Urine   Result Value Ref Range    Color Yellow Yellow, Straw, Dark Yellow, Gloria    Clarity, UA Cloudy (A) Clear    Specific Gravity  1.030 1.005 - 1.030    pH, Urine 5.0 5.0 - 8.0    Leukocytes 75 Telma/ul (A) Negative    Nitrite, UA Negative Negative    Protein, POC 30 mg/dL (A) Negative  mg/dL    Glucose, UA Negative Negative mg/dL    Ketones, UA Negative Negative    Urobilinogen, UA Normal Normal, 0.2 E.U./dL    Bilirubin Negative Negative    Blood, UA Large (A) Negative    Lot Number [id number]     Expiration Date 2024            HEALTH RISK ASSESSMENT    Smoking Status:  Social History     Tobacco Use   Smoking Status Never   Smokeless Tobacco Never     Alcohol Consumption:  Social History     Substance and Sexual Activity   Alcohol Use Yes    Alcohol/week: 20.0 standard drinks of alcohol    Types: 20 Glasses of wine per week    Comment: SOCIAL     Fall Risk Screen:    STEADI Fall Risk Assessment was completed, and patient is at LOW risk for falls.Assessment completed on:2023    Depression Screenin/8/2023     8:16 AM   PHQ-2/PHQ-9 Depression Screening   Little Interest or Pleasure in Doing Things 0-->not at all   Feeling Down, Depressed or Hopeless 0-->not at all   PHQ-9: Brief Depression Severity Measure Score 0       Health Habits and Functional and Cognitive Screenin/8/2023     8:16 AM   Functional & Cognitive Status   Do you have difficulty preparing food and eating? No   Do you have difficulty bathing yourself, getting dressed or grooming yourself? No   Do you have difficulty using the toilet? No   Do you have difficulty moving around from place to place? No   Do you have trouble with steps or getting out of a bed or a chair? No   Current Diet Well Balanced Diet   Dental Exam Up to date   Eye Exam Up to date   Exercise (times per week) 3 times per week   Current Exercises Include Walking   Do you need help using the phone?  No   Are you deaf or do you have serious difficulty hearing?  No   Do you need help to go to places out of walking distance? No   Do you need help shopping? No   Do you need help preparing meals?  No   Do you need help with housework?  No   Do you need help with laundry? No   Do you need help taking your medications? No   Do you need  help managing money? No   Do you ever drive or ride in a car without wearing a seat belt? No   Have you felt unusual stress, anger or loneliness in the last month? No   Who do you live with? Spouse   If you need help, do you have trouble finding someone available to you? No   Have you been bothered in the last four weeks by sexual problems? No   Do you have difficulty concentrating, remembering or making decisions? No       Age-appropriate Screening Schedule:  Refer to the list below for future screening recommendations based on patient's age, sex and/or medical conditions. Orders for these recommended tests are listed in the plan section. The patient has been provided with a written plan.    Health Maintenance   Topic Date Due    DXA SCAN  11/28/2020    COVID-19 Vaccine (4 - 2023-24 season) 09/01/2023    GASTROSCOPY (EGD)  03/15/2024    MAMMOGRAM  04/18/2024    LIPID PANEL  12/06/2024    ANNUAL WELLNESS VISIT  12/08/2024    COLORECTAL CANCER SCREENING  12/16/2029    TDAP/TD VACCINES (3 - Td or Tdap) 09/22/2031    HEPATITIS C SCREENING  Completed    INFLUENZA VACCINE  Completed    Pneumococcal Vaccine 65+  Completed    ZOSTER VACCINE  Completed    PAP SMEAR  Discontinued                  CMS Preventative Services Quick Reference  Risk Factors Identified During Encounter  Dental Screening Recommended  Vision Screening Recommended  The above risks/problems have been discussed with the patient.  Pertinent information has been shared with the patient in the After Visit Summary.  An After Visit Summary and PPPS were made available to the patient.    Follow Up:   Next Medicare Wellness visit to be scheduled in 1 year.       Additional E&M Note during same encounter follows:  Patient has multiple medical problems which are significant and separately identifiable that require additional work above and beyond the Medicare Wellness Visit.      Chief Complaint  Medicare Wellness-subsequent, Hypertension, Mouth Lesions, and  "Prediabetes    Subjective        HPI  Teagan Cates is also being seen today for hypertension, hyperlipidemia, anxiety and with c/o cloudy and foul odor urine.   Patient was seeing Dr. Huntley, GYN, retired; now gong to see APRN in office in January. Will  have mammogram and dexa scan done in January as well.   Patient has an appt to see Gastro. in December as well.     Review of Systems   Constitutional: Negative.    Respiratory: Negative.     Cardiovascular: Negative.    Gastrointestinal: Negative.    Genitourinary:         Cloudy and foul odor   Neurological: Negative.    Psychiatric/Behavioral: Negative.  Negative for dysphoric mood, self-injury, sleep disturbance and suicidal ideas. The patient is not nervous/anxious.        Objective   Vital Signs:  /72   Pulse 92   Temp 98.1 °F (36.7 °C)   Resp 16   Ht 165.1 cm (65\")   Wt 68 kg (149 lb 14.6 oz)   SpO2 95%   BMI 24.95 kg/m² ; repeat b/p 130/78    Physical Exam  Vitals reviewed.   HENT:      Head: Normocephalic.   Eyes:      Pupils: Pupils are equal, round, and reactive to light.   Neck:      Vascular: No carotid bruit.   Cardiovascular:      Rate and Rhythm: Normal rate and regular rhythm.      Pulses: Normal pulses.   Pulmonary:      Effort: Pulmonary effort is normal.      Breath sounds: Normal breath sounds.   Abdominal:      General: Bowel sounds are normal. There is no distension.      Palpations: Abdomen is soft. There is no hepatomegaly or splenomegaly.      Tenderness: There is no abdominal tenderness. There is no right CVA tenderness, left CVA tenderness, guarding or rebound. Negative signs include McBurney's sign.      Hernia: There is no hernia in the ventral area.   Musculoskeletal:         General: Normal range of motion.      Right lower leg: No edema.      Left lower leg: No edema.   Skin:     General: Skin is warm and dry.   Neurological:      Mental Status: She is alert and oriented to person, place, and time. "   Psychiatric:         Behavior: Behavior normal.                         Assessment and Plan   Diagnoses and all orders for this visit:    1. Medicare annual wellness visit, subsequent (Primary)    2. Benign essential HTN  -     NIFEdipine XL (Procardia XL) 30 MG 24 hr tablet; Take 1 tablet by mouth Daily.  Dispense: 90 tablet; Refill: 1    3. Mixed hyperlipidemia    4. Prediabetes  -     metFORMIN ER (GLUCOPHAGE-XR) 500 MG 24 hr tablet; Take 1 tablet by mouth Daily With Breakfast.  Dispense: 90 tablet; Refill: 1  -     Comprehensive metabolic panel; Future  -     Hemoglobin A1c; Future    5. Anxiety, generalized    6. Pyuria  -     Urinalysis With Culture If Indicated - Urine, Clean Catch  -     amoxicillin-clavulanate (Augmentin) 500-125 MG per tablet; Take 1 tablet by mouth 2 (Two) Times a Day for 5 days.  Dispense: 10 tablet; Refill: 0    Hypertension: stable, continue HCTZ 25 mg 1 tab daily and procardia XL 30 mg 1 tab daily    Hyperlipidemia: stable, continue rosuvastatin 20 mg 1 tab daily; recommend Patient to continue to eat a heart healthy low sugar diet, drink plenty of water through out day and exercise 3-5 times a week, for more than 30 minutes at a time.Patient does not need refills today. Will refill when needed. Call pharmacy for request.    Pre diabetes: recommend decreasing alcohol intake, starting on metformin 500 mg 1 tab daily; will recheck cmp and HgA1c in 6 months.     Anxiety: stable, continue Venlafaxine  mg daily. Patient does not need refills today. Will refill when needed. Call pharmacy for request.    Pyuria: starting on Augmentin bid x 5 days; ordering u/a with culture to be sent out.     Follow up with dentist and optometrist when able  Always use seat belt when in vehicles           Follow Up   Return for fasting labs in 6 months, recheck in 6 months.  Patient was given instructions and counseling regarding her condition or for health maintenance advice. Please see specific  information pulled into the AVS if appropriate.

## 2023-12-13 ENCOUNTER — TELEPHONE (OUTPATIENT)
Dept: FAMILY MEDICINE CLINIC | Facility: CLINIC | Age: 73
End: 2023-12-13
Payer: MEDICARE

## 2023-12-13 LAB
APPEARANCE UR: ABNORMAL
BACTERIA #/AREA URNS HPF: ABNORMAL /[HPF]
BACTERIA UR CULT: ABNORMAL
BACTERIA UR CULT: ABNORMAL
BILIRUB UR QL STRIP: ABNORMAL
CASTS URNS QL MICRO: ABNORMAL /LPF
COLOR UR: YELLOW
CRYSTALS URNS MICRO: ABNORMAL
EPI CELLS #/AREA URNS HPF: >10 /HPF (ref 0–10)
GLUCOSE UR QL STRIP: NEGATIVE
HGB UR QL STRIP: NEGATIVE
KETONES UR QL STRIP: NEGATIVE
LEUKOCYTE ESTERASE UR QL STRIP: ABNORMAL
Lab: NORMAL
MICRO URNS: ABNORMAL
MUCOUS THREADS URNS QL MICRO: PRESENT
NITRITE UR QL STRIP: NEGATIVE
OTHER ANTIBIOTIC SUSC ISLT: ABNORMAL
PH UR STRIP: 6.5 [PH] (ref 5–7.5)
PROT UR QL STRIP: ABNORMAL
RBC #/AREA URNS HPF: ABNORMAL /HPF (ref 0–2)
SP GR UR STRIP: 1.02 (ref 1–1.03)
UNIDENT CRYS URNS QL MICRO: PRESENT
URINALYSIS REFLEX: ABNORMAL
UROBILINOGEN UR STRIP-MCNC: 1 MG/DL (ref 0.2–1)
WBC #/AREA URNS HPF: ABNORMAL /HPF (ref 0–5)

## 2023-12-13 NOTE — TELEPHONE ENCOUNTER
Called and spoke with pt regarding her lab result, pt stated that she is feeling better, and pt verbally understood.

## 2023-12-13 NOTE — TELEPHONE ENCOUNTER
----- Message from MARISOL Holley sent at 12/13/2023  9:22 AM EST -----  Please call Patient and inform her that her urine culture came back for E coli infection, the augmentin prescribed covers infection. If she should have any issues after taking script to please call/rto. thanks

## 2023-12-21 RX ORDER — LISINOPRIL 10 MG/1
10 TABLET ORAL DAILY
Qty: 90 TABLET | OUTPATIENT
Start: 2023-12-21

## 2023-12-21 NOTE — TELEPHONE ENCOUNTER
Rx Refill Note  Requested Prescriptions     Pending Prescriptions Disp Refills    lisinopril (PRINIVIL,ZESTRIL) 10 MG tablet [Pharmacy Med Name: LISINOPRIL 10 MG TABLET] 90 tablet      Sig: TAKE 1 TABLET BY MOUTH EVERY DAY      Last office visit with prescribing clinician: 12/8/2023   Last telemedicine visit with prescribing clinician: Visit date not found   Next office visit with prescribing clinician: 6/26/2024                         Would you like a call back once the refill request has been completed: [] Yes [] No    If the office needs to give you a call back, can they leave a voicemail: [] Yes [] No    Shilpi Sparks MA  12/21/23, 08:18 EST

## 2024-01-08 ENCOUNTER — OFFICE VISIT (OUTPATIENT)
Dept: FAMILY MEDICINE CLINIC | Facility: CLINIC | Age: 74
End: 2024-01-08
Payer: MEDICARE

## 2024-01-08 VITALS
WEIGHT: 149.7 LBS | HEIGHT: 65 IN | BODY MASS INDEX: 24.94 KG/M2 | DIASTOLIC BLOOD PRESSURE: 70 MMHG | SYSTOLIC BLOOD PRESSURE: 132 MMHG | OXYGEN SATURATION: 96 % | TEMPERATURE: 98.4 F | HEART RATE: 88 BPM | RESPIRATION RATE: 14 BRPM

## 2024-01-08 DIAGNOSIS — U07.1 COVID-19 VIRUS INFECTION: Primary | ICD-10-CM

## 2024-01-08 DIAGNOSIS — J40 BRONCHITIS: ICD-10-CM

## 2024-01-08 DIAGNOSIS — R05.1 ACUTE COUGH: ICD-10-CM

## 2024-01-08 LAB
EXPIRATION DATE: ABNORMAL
FLUAV AG UPPER RESP QL IA.RAPID: NOT DETECTED
FLUBV AG UPPER RESP QL IA.RAPID: NOT DETECTED
INTERNAL CONTROL: ABNORMAL
Lab: ABNORMAL
SARS-COV-2 AG UPPER RESP QL IA.RAPID: DETECTED

## 2024-01-08 PROCEDURE — 1159F MED LIST DOCD IN RCRD: CPT | Performed by: NURSE PRACTITIONER

## 2024-01-08 PROCEDURE — 3078F DIAST BP <80 MM HG: CPT | Performed by: NURSE PRACTITIONER

## 2024-01-08 PROCEDURE — 3075F SYST BP GE 130 - 139MM HG: CPT | Performed by: NURSE PRACTITIONER

## 2024-01-08 PROCEDURE — 1160F RVW MEDS BY RX/DR IN RCRD: CPT | Performed by: NURSE PRACTITIONER

## 2024-01-08 PROCEDURE — 99213 OFFICE O/P EST LOW 20 MIN: CPT | Performed by: NURSE PRACTITIONER

## 2024-01-08 PROCEDURE — 87428 SARSCOV & INF VIR A&B AG IA: CPT | Performed by: NURSE PRACTITIONER

## 2024-01-08 RX ORDER — BENZONATATE 100 MG/1
100 CAPSULE ORAL 3 TIMES DAILY PRN
Qty: 30 CAPSULE | Refills: 0 | Status: SHIPPED | OUTPATIENT
Start: 2024-01-08

## 2024-01-08 RX ORDER — ALBUTEROL SULFATE 90 UG/1
2 AEROSOL, METERED RESPIRATORY (INHALATION) EVERY 4 HOURS PRN
Qty: 18 G | Refills: 0 | Status: SHIPPED | OUTPATIENT
Start: 2024-01-08

## 2024-01-08 RX ORDER — AMOXICILLIN AND CLAVULANATE POTASSIUM 875; 125 MG/1; MG/1
1 TABLET, FILM COATED ORAL 2 TIMES DAILY
Qty: 14 TABLET | Refills: 0 | Status: SHIPPED | OUTPATIENT
Start: 2024-01-08 | End: 2024-01-15

## 2024-01-08 NOTE — PATIENT INSTRUCTIONS
Detail discussion of paxlovid; potential adverse effects/side effects, medication interactions, contraindications and warnings. Discussed pt's lab results (12/6/23 Bun 12 creat 0.66 Eg Fr 93.3 AST 38 ALT 47) and that paxlovid metabolized though liver and excreted partially through kidneys. Discussed medication interactions and informed to stop rosuvastatin while taking paxlovid. Pt verbalizing understanding to all above, its risks and potential adverse effects. Pt stating she wants paxlovid and orders placed.    Drink plenty of fluids-water preferably, eat a heart healthy diet, get plenty of sleep and do warm salt water gargles twice a day until feeling better; pt informed to stay in quarantine for 5 days and then wear mask when in public or around others for 5 additional days. Pt verb. Understanding.

## 2024-01-08 NOTE — PROGRESS NOTES
Subjective     Teagan Cates is a 73 y.o.. female.     Cough  Associated symptoms include headaches (at begining but not now), postnasal drip, a sore throat (at beginning, not now), shortness of breath (with cough) and wheezing (at night). Pertinent negatives include no ear pain, fever or rhinorrhea.   URI   This is a new problem. Episode onset: 2 weeks. Associated symptoms include congestion, coughing (non productive), headaches (at begining but not now), a sore throat (at beginning, not now) and wheezing (at night). Pertinent negatives include no diarrhea, ear pain, nausea, rhinorrhea or vomiting.       The following portions of the patient's history were reviewed and updated as appropriate: allergies, current medications, past family history, past medical history, past social history, past surgical history and problem list.    Past Medical History:   Diagnosis Date    Allergic 1980    Sulfa    Anxiety     Pedro's esophagus without dysplasia 04/08/2021    GERD (gastroesophageal reflux disease)     Heart murmur ?    Hernia     Hyperlipidemia     Hypertension        Past Surgical History:   Procedure Laterality Date    ABDOMINOPLASTY      BREAST SURGERY Bilateral     breast reduction    CATARACT EXTRACTION W/ INTRAOCULAR LENS IMPLANT Bilateral     CHOLECYSTECTOMY      COLONOSCOPY  02/2016    normal per pt     COLONOSCOPY N/A 12/16/2022    Procedure: COLONOSCOPY TO CECUM/TI WITH COLD SNARE POLYPECTOMIES AND RANDOM COLON BIOPSIES;  Surgeon: Julianne White MD;  Location:  [facility];  Service: Gastroenterology;  Laterality: N/A;  pre: LOWER ABDOMINAL PAIN, CHANGE IN BOWEL  post: POLYPS, HEMORRHOIDS    COSMETIC SURGERY  1995    ENDOSCOPY N/A 05/02/2017    large HH, gastritis, multiple gastric polyps, nodule in duodenum    ENDOSCOPY N/A 03/15/2021    Procedure: ESOPHAGOGASTRODUODENOSCOPY WITH COLD BX'S;  Surgeon: Julianne White MD;  Location:  [facility];  Service: Gastroenterology;  Laterality:  "N/A;  pre: hx hiatal hernia, GERD  post: hiatal hernia, gastritis, gastric plyps, lymphangiectasia.     EYE SURGERY  2015    HYSTERECTOMY      MOHS SURGERY  2017    TUBAL ABDOMINAL LIGATION      UPPER GASTROINTESTINAL ENDOSCOPY         Review of Systems   Constitutional:  Negative for fever.   HENT:  Positive for congestion, postnasal drip and sore throat (at beginning, not now). Negative for ear pain and rhinorrhea.    Respiratory:  Positive for cough (non productive), shortness of breath (with cough) and wheezing (at night).    Gastrointestinal:  Negative for diarrhea, nausea and vomiting.   Neurological:  Positive for headaches (at begining but not now).       Allergies   Allergen Reactions    Sulfa Antibiotics Anaphylaxis     anaphylaxis    Atacand [Candesartan] Hives    Iodinated Contrast Media Rash       Objective     Vitals:    01/08/24 1307   BP: 132/70   BP Location: Right arm   Patient Position: Sitting   Cuff Size: Adult   Pulse: 88   Resp: 14   Temp: 98.4 °F (36.9 °C)   TempSrc: Oral   SpO2: 96%   Weight: 67.9 kg (149 lb 11.2 oz)   Height: 165.1 cm (65\")     Body mass index is 24.91 kg/m².    Physical Exam  Vitals reviewed.   Constitutional:       Appearance: She is well-developed.   HENT:      Head: Normocephalic and atraumatic.      Right Ear: A middle ear effusion (clear fluid) is present. Tympanic membrane is not erythematous.      Left Ear: A middle ear effusion (clear fluid) is present. Tympanic membrane is not erythematous.      Nose: Congestion present.      Mouth/Throat:      Mouth: Mucous membranes are moist.      Pharynx: Oropharyngeal exudate (clear pnd) present. No pharyngeal swelling or posterior oropharyngeal erythema.   Eyes:      Conjunctiva/sclera: Conjunctivae normal.      Pupils: Pupils are equal, round, and reactive to light.   Cardiovascular:      Rate and Rhythm: Normal rate and regular rhythm.      Heart sounds: No murmur heard.  Pulmonary:      Effort: Pulmonary effort is normal. " No accessory muscle usage or respiratory distress.      Breath sounds: No stridor. Examination of the right-upper field reveals wheezing and rhonchi. Examination of the left-upper field reveals wheezing and rhonchi. Wheezing and rhonchi present. No rales.   Musculoskeletal:         General: Normal range of motion.   Lymphadenopathy:      Cervical: Cervical adenopathy present.   Skin:     General: Skin is warm and dry.   Neurological:      Mental Status: She is alert and oriented to person, place, and time.           Current Outpatient Medications:     hydroCHLOROthiazide (HYDRODIURIL) 25 MG tablet, Take 1 tablet by mouth Daily., Disp: 90 tablet, Rfl: 0    MAGNESIUM PO, Take  by mouth., Disp: , Rfl:     metFORMIN ER (GLUCOPHAGE-XR) 500 MG 24 hr tablet, Take 1 tablet by mouth Daily With Breakfast., Disp: 90 tablet, Rfl: 1    NIFEdipine XL (Procardia XL) 30 MG 24 hr tablet, Take 1 tablet by mouth Daily., Disp: 90 tablet, Rfl: 1    omeprazole (priLOSEC) 40 MG capsule, Take 1 capsule by mouth Every Morning Before Breakfast., Disp: 90 capsule, Rfl: 1    rosuvastatin (CRESTOR) 20 MG tablet, TAKE 1 TABLET BY MOUTH EVERY DAY, Disp: 90 tablet, Rfl: 1    venlafaxine XR (EFFEXOR-XR) 150 MG 24 hr capsule, TAKE 1 CAPSULE BY MOUTH EVERY DAY, Disp: 90 capsule, Rfl: 1    vitamin B-12 (CYANOCOBALAMIN) 1000 MCG tablet, Take 1 tablet by mouth Daily., Disp: , Rfl:     albuterol sulfate  (90 Base) MCG/ACT inhaler, Inhale 2 puffs Every 4 (Four) Hours As Needed for Wheezing or Shortness of Air., Disp: 18 g, Rfl: 0    amoxicillin-clavulanate (AUGMENTIN) 875-125 MG per tablet, Take 1 tablet by mouth 2 (Two) Times a Day for 7 days., Disp: 14 tablet, Rfl: 0    benzonatate (Tessalon Perles) 100 MG capsule, Take 1 capsule by mouth 3 (Three) Times a Day As Needed for Cough., Disp: 30 capsule, Rfl: 0    Nirmatrelvir & Ritonavir, 300mg/100mg, (PAXLOVID) 20 x 150 MG & 10 x 100MG tablet therapy pack tablet, Take 3 tablets by mouth 2 (Two)  Times a Day for 5 days. Indications: 12/6/23 labs Bun 12 creat 0.66 Eg Fr 93.3 AST 38 ALT 47, Disp: 30 tablet, Rfl: 0    No results found for this or any previous visit (from the past 168 hour(s)).          Diagnoses and all orders for this visit:    1. COVID-19 virus infection (Primary)  -     Nirmatrelvir & Ritonavir, 300mg/100mg, (PAXLOVID) 20 x 150 MG & 10 x 100MG tablet therapy pack tablet; Take 3 tablets by mouth 2 (Two) Times a Day for 5 days. Indications: 12/6/23 labs Bun 12 creat 0.66 Eg Fr 93.3 AST 38 ALT 47  Dispense: 30 tablet; Refill: 0    2. Bronchitis  -     amoxicillin-clavulanate (AUGMENTIN) 875-125 MG per tablet; Take 1 tablet by mouth 2 (Two) Times a Day for 7 days.  Dispense: 14 tablet; Refill: 0  -     albuterol sulfate  (90 Base) MCG/ACT inhaler; Inhale 2 puffs Every 4 (Four) Hours As Needed for Wheezing or Shortness of Air.  Dispense: 18 g; Refill: 0    3. Acute cough  -     benzonatate (Tessalon Perles) 100 MG capsule; Take 1 capsule by mouth 3 (Three) Times a Day As Needed for Cough.  Dispense: 30 capsule; Refill: 0  -     Covid-19 + Flu A&B AG, Veritor        Patient Instructions   Detail discussion of paxlovid; potential adverse effects/side effects, medication interactions, contraindications and warnings. Discussed pt's lab results (12/6/23 Bun 12 creat 0.66 Eg Fr 93.3 AST 38 ALT 47) and that paxlovid metabolized though liver and excreted partially through kidneys. Discussed medication interactions and informed to stop rosuvastatin while taking paxlovid. Pt verbalizing understanding to all above, its risks and potential adverse effects. Pt stating she wants paxlovid and orders placed.    Drink plenty of fluids-water preferably, eat a heart healthy diet, get plenty of sleep and do warm salt water gargles twice a day until feeling better; pt informed to stay in quarantine for 5 days and then wear mask when in public or around others for 5 additional days. Pt verb. Understanding.       Return if symptoms worsen or fail to improve.

## 2024-01-16 DIAGNOSIS — J40 BRONCHITIS: ICD-10-CM

## 2024-01-16 DIAGNOSIS — R05.1 ACUTE COUGH: ICD-10-CM

## 2024-01-16 RX ORDER — BENZONATATE 100 MG/1
CAPSULE ORAL
Qty: 30 CAPSULE | Refills: 0 | OUTPATIENT
Start: 2024-01-16

## 2024-01-16 RX ORDER — ALBUTEROL SULFATE 90 UG/1
2 AEROSOL, METERED RESPIRATORY (INHALATION) EVERY 4 HOURS PRN
OUTPATIENT
Start: 2024-01-16

## 2024-01-31 RX ORDER — OMEPRAZOLE 40 MG/1
40 CAPSULE, DELAYED RELEASE ORAL
Qty: 90 CAPSULE | Refills: 1 | Status: SHIPPED | OUTPATIENT
Start: 2024-01-31

## 2024-02-06 DIAGNOSIS — R05.3 CHRONIC COUGH: Primary | ICD-10-CM

## 2024-02-07 ENCOUNTER — HOSPITAL ENCOUNTER (OUTPATIENT)
Dept: GENERAL RADIOLOGY | Facility: HOSPITAL | Age: 74
Discharge: HOME | End: 2024-02-07
Admitting: NURSE PRACTITIONER
Payer: MEDICARE

## 2024-02-07 DIAGNOSIS — R05.3 CHRONIC COUGH: ICD-10-CM

## 2024-02-07 PROCEDURE — 71046 X-RAY EXAM CHEST 2 VIEWS: CPT

## 2024-02-08 DIAGNOSIS — I10 BENIGN ESSENTIAL HTN: ICD-10-CM

## 2024-02-08 RX ORDER — HYDROCHLOROTHIAZIDE 25 MG/1
25 TABLET ORAL DAILY
Qty: 90 TABLET | Refills: 0 | Status: SHIPPED | OUTPATIENT
Start: 2024-02-08

## 2024-02-08 NOTE — TELEPHONE ENCOUNTER
"  Caller: Teagan Cates \"Teagan Cates\"    Relationship: Self    Best call back number: [phone]    Requested Prescriptions:   Requested Prescriptions     Pending Prescriptions Disp Refills    hydroCHLOROthiazide (HYDRODIURIL) 25 MG tablet 90 tablet 0     Sig: Take 1 tablet by mouth Daily.        Pharmacy where request should be sent: [facility]Y #[id number] [place], [place] - [address] [address] [address]. AT [facility] [phone] [facility] [phone]      Last office visit with prescribing clinician: 1/8/2024   Last telemedicine visit with prescribing clinician: Visit date not found   Next office visit with prescribing clinician: 6/26/2024     Additional details provided by patient:     Does the patient have less than a 3 day supply:  [x] Yes  [] No    Would you like a call back once the refill request has been completed: [] Yes [x] No    If the office needs to give you a call back, can they leave a voicemail: [] Yes [x] No    Francisco J Garibay Rep   02/08/24 10:55 EST         "

## 2024-02-13 DIAGNOSIS — R05.3 CHRONIC COUGH: Primary | ICD-10-CM

## 2024-02-13 DIAGNOSIS — K22.70 BARRETT'S ESOPHAGUS WITHOUT DYSPLASIA: ICD-10-CM

## 2024-02-13 RX ORDER — RABEPRAZOLE SODIUM 20 MG/1
20 TABLET, DELAYED RELEASE ORAL DAILY
Qty: 90 TABLET | Refills: 0 | Status: SHIPPED | OUTPATIENT
Start: 2024-02-13

## 2024-02-18 ENCOUNTER — HOSPITAL ENCOUNTER (EMERGENCY)
Facility: HOSPITAL | Age: 74
Discharge: HOME | End: 2024-02-18
Attending: EMERGENCY MEDICINE | Admitting: EMERGENCY MEDICINE
Payer: MEDICARE

## 2024-02-18 ENCOUNTER — APPOINTMENT (OUTPATIENT)
Dept: CT IMAGING | Facility: HOSPITAL | Age: 74
End: 2024-02-18
Payer: MEDICARE

## 2024-02-18 VITALS
TEMPERATURE: 97.8 F | SYSTOLIC BLOOD PRESSURE: 151 MMHG | DIASTOLIC BLOOD PRESSURE: 82 MMHG | WEIGHT: 149.7 LBS | RESPIRATION RATE: 16 BRPM | HEART RATE: 72 BPM | HEIGHT: 65 IN | OXYGEN SATURATION: 94 % | BODY MASS INDEX: 24.94 KG/M2

## 2024-02-18 DIAGNOSIS — R11.2 NAUSEA AND VOMITING, UNSPECIFIED VOMITING TYPE: ICD-10-CM

## 2024-02-18 DIAGNOSIS — R51.9 ACUTE NONINTRACTABLE HEADACHE, UNSPECIFIED HEADACHE TYPE: Primary | ICD-10-CM

## 2024-02-18 DIAGNOSIS — E87.6 HYPOKALEMIA: ICD-10-CM

## 2024-02-18 LAB
ALBUMIN SERPL-MCNC: 4.7 G/DL (ref 3.5–5.2)
ALBUMIN/GLOB SERPL: 1.7 G/DL
ALP SERPL-CCNC: 56 U/L (ref 39–117)
ALT SERPL W P-5'-P-CCNC: 25 U/L (ref 1–33)
AMORPH URATE CRY URNS QL MICRO: ABNORMAL /HPF
ANION GAP SERPL CALCULATED.3IONS-SCNC: 13.1 MMOL/L (ref 5–15)
AST SERPL-CCNC: 23 U/L (ref 1–32)
BACTERIA UR QL AUTO: ABNORMAL /HPF
BASOPHILS # BLD AUTO: 0.02 10*3/MM3 (ref 0–0.2)
BASOPHILS NFR BLD AUTO: 0.2 % (ref 0–1.5)
BILIRUB SERPL-MCNC: 0.4 MG/DL (ref 0–1.2)
BILIRUB UR QL STRIP: ABNORMAL
BUN SERPL-MCNC: 10 MG/DL (ref 8–23)
BUN/CREAT SERPL: 18.2 (ref 7–25)
CALCIUM SPEC-SCNC: 10.4 MG/DL (ref 8.6–10.5)
CHLORIDE SERPL-SCNC: 97 MMOL/L (ref 98–107)
CLARITY UR: ABNORMAL
CO2 SERPL-SCNC: 28.9 MMOL/L (ref 22–29)
COLOR UR: YELLOW
CREAT SERPL-MCNC: 0.55 MG/DL (ref 0.57–1)
DEPRECATED RDW RBC AUTO: 41.1 FL (ref 37–54)
EGFRCR SERPLBLD CKD-EPI 2021: 97.5 ML/MIN/1.73
EOSINOPHIL # BLD AUTO: 0.05 10*3/MM3 (ref 0–0.4)
EOSINOPHIL NFR BLD AUTO: 0.5 % (ref 0.3–6.2)
ERYTHROCYTE [DISTWIDTH] IN BLOOD BY AUTOMATED COUNT: 12.4 % (ref 12.3–15.4)
FLUAV SUBTYP SPEC NAA+PROBE: NOT DETECTED
FLUBV RNA ISLT QL NAA+PROBE: NOT DETECTED
GLOBULIN UR ELPH-MCNC: 2.7 GM/DL
GLUCOSE SERPL-MCNC: 129 MG/DL (ref 65–99)
GLUCOSE UR STRIP-MCNC: NEGATIVE MG/DL
HCT VFR BLD AUTO: 40.6 % (ref 34–46.6)
HGB BLD-MCNC: 13.9 G/DL (ref 12–15.9)
HGB UR QL STRIP.AUTO: NEGATIVE
HOLD SPECIMEN: NORMAL
HYALINE CASTS UR QL AUTO: ABNORMAL /LPF
IMM GRANULOCYTES # BLD AUTO: 0.02 10*3/MM3 (ref 0–0.05)
IMM GRANULOCYTES NFR BLD AUTO: 0.2 % (ref 0–0.5)
KETONES UR QL STRIP: ABNORMAL
LEUKOCYTE ESTERASE UR QL STRIP.AUTO: ABNORMAL
LIPASE SERPL-CCNC: 28 U/L (ref 13–60)
LYMPHOCYTES # BLD AUTO: 2.29 10*3/MM3 (ref 0.7–3.1)
LYMPHOCYTES NFR BLD AUTO: 24.9 % (ref 19.6–45.3)
MAGNESIUM SERPL-MCNC: 1.9 MG/DL (ref 1.6–2.4)
MCH RBC QN AUTO: 30.6 PG (ref 26.6–33)
MCHC RBC AUTO-ENTMCNC: 34.2 G/DL (ref 31.5–35.7)
MCV RBC AUTO: 89.4 FL (ref 79–97)
MONOCYTES # BLD AUTO: 0.74 10*3/MM3 (ref 0.1–0.9)
MONOCYTES NFR BLD AUTO: 8.1 % (ref 5–12)
NEUTROPHILS NFR BLD AUTO: 6.06 10*3/MM3 (ref 1.7–7)
NEUTROPHILS NFR BLD AUTO: 66.1 % (ref 42.7–76)
NITRITE UR QL STRIP: NEGATIVE
PH UR STRIP.AUTO: 7.5 [PH] (ref 5–8)
PLATELET # BLD AUTO: 367 10*3/MM3 (ref 140–450)
PMV BLD AUTO: 9.2 FL (ref 6–12)
POTASSIUM SERPL-SCNC: 3 MMOL/L (ref 3.5–5.2)
PROT SERPL-MCNC: 7.4 G/DL (ref 6–8.5)
PROT UR QL STRIP: ABNORMAL
RBC # BLD AUTO: 4.54 10*6/MM3 (ref 3.77–5.28)
RBC # UR STRIP: ABNORMAL /HPF
REF LAB TEST METHOD: ABNORMAL
SARS-COV-2 RNA RESP QL NAA+PROBE: NOT DETECTED
SODIUM SERPL-SCNC: 139 MMOL/L (ref 136–145)
SP GR UR STRIP: 1.01 (ref 1–1.03)
SQUAMOUS #/AREA URNS HPF: ABNORMAL /HPF
UROBILINOGEN UR QL STRIP: ABNORMAL
WBC # UR STRIP: ABNORMAL /HPF
WBC NRBC COR # BLD AUTO: 9.18 10*3/MM3 (ref 3.4–10.8)

## 2024-02-18 PROCEDURE — 25810000003 SODIUM CHLORIDE 0.9 % SOLUTION: Performed by: EMERGENCY MEDICINE

## 2024-02-18 PROCEDURE — 25010000002 DIPHENHYDRAMINE PER 50 MG: Performed by: EMERGENCY MEDICINE

## 2024-02-18 PROCEDURE — 83735 ASSAY OF MAGNESIUM: CPT | Performed by: EMERGENCY MEDICINE

## 2024-02-18 PROCEDURE — 99284 EMERGENCY DEPT VISIT MOD MDM: CPT | Performed by: EMERGENCY MEDICINE

## 2024-02-18 PROCEDURE — 87636 SARSCOV2 & INF A&B AMP PRB: CPT | Performed by: EMERGENCY MEDICINE

## 2024-02-18 PROCEDURE — 99284 EMERGENCY DEPT VISIT MOD MDM: CPT

## 2024-02-18 PROCEDURE — 83690 ASSAY OF LIPASE: CPT | Performed by: EMERGENCY MEDICINE

## 2024-02-18 PROCEDURE — 96375 TX/PRO/DX INJ NEW DRUG ADDON: CPT

## 2024-02-18 PROCEDURE — 96361 HYDRATE IV INFUSION ADD-ON: CPT

## 2024-02-18 PROCEDURE — 70450 CT HEAD/BRAIN W/O DYE: CPT

## 2024-02-18 PROCEDURE — 81001 URINALYSIS AUTO W/SCOPE: CPT | Performed by: EMERGENCY MEDICINE

## 2024-02-18 PROCEDURE — 80053 COMPREHEN METABOLIC PANEL: CPT | Performed by: EMERGENCY MEDICINE

## 2024-02-18 PROCEDURE — 25010000002 PROCHLORPERAZINE 10 MG/2ML SOLUTION: Performed by: EMERGENCY MEDICINE

## 2024-02-18 PROCEDURE — 85025 COMPLETE CBC W/AUTO DIFF WBC: CPT | Performed by: EMERGENCY MEDICINE

## 2024-02-18 PROCEDURE — 96374 THER/PROPH/DIAG INJ IV PUSH: CPT

## 2024-02-18 RX ORDER — PROCHLORPERAZINE EDISYLATE 5 MG/ML
10 INJECTION INTRAMUSCULAR; INTRAVENOUS ONCE
Status: COMPLETED | OUTPATIENT
Start: 2024-02-18 | End: 2024-02-18

## 2024-02-18 RX ORDER — DIPHENHYDRAMINE HYDROCHLORIDE 50 MG/ML
25 INJECTION INTRAMUSCULAR; INTRAVENOUS ONCE
Status: COMPLETED | OUTPATIENT
Start: 2024-02-18 | End: 2024-02-18

## 2024-02-18 RX ORDER — ONDANSETRON 4 MG/1
4 TABLET, ORALLY DISINTEGRATING ORAL EVERY 8 HOURS PRN
Qty: 6 TABLET | Refills: 0 | Status: SHIPPED | OUTPATIENT
Start: 2024-02-18

## 2024-02-18 RX ORDER — POTASSIUM CHLORIDE 750 MG/1
40 TABLET, FILM COATED, EXTENDED RELEASE ORAL ONCE
Status: COMPLETED | OUTPATIENT
Start: 2024-02-18 | End: 2024-02-18

## 2024-02-18 RX ORDER — NAPROXEN 500 MG/1
500 TABLET ORAL 2 TIMES DAILY PRN
Qty: 15 TABLET | Refills: 0 | Status: SHIPPED | OUTPATIENT
Start: 2024-02-18

## 2024-02-18 RX ORDER — SODIUM CHLORIDE 0.9 % (FLUSH) 0.9 %
10 SYRINGE (ML) INJECTION AS NEEDED
Status: DISCONTINUED | OUTPATIENT
Start: 2024-02-18 | End: 2024-02-18 | Stop reason: HOSPADM

## 2024-02-18 RX ORDER — POTASSIUM CHLORIDE 1500 MG/1
20 TABLET, EXTENDED RELEASE ORAL DAILY
Qty: 2 TABLET | Refills: 0 | Status: SHIPPED | OUTPATIENT
Start: 2024-02-18 | End: 2024-02-20

## 2024-02-18 RX ADMIN — DIPHENHYDRAMINE HYDROCHLORIDE 25 MG: 50 INJECTION, SOLUTION INTRAMUSCULAR; INTRAVENOUS at 09:14

## 2024-02-18 RX ADMIN — PROCHLORPERAZINE EDISYLATE 10 MG: 5 INJECTION INTRAMUSCULAR; INTRAVENOUS at 09:12

## 2024-02-18 RX ADMIN — SODIUM CHLORIDE 1000 ML: 9 INJECTION, SOLUTION INTRAVENOUS at 09:12

## 2024-02-18 RX ADMIN — POTASSIUM CHLORIDE 40 MEQ: 750 TABLET, EXTENDED RELEASE ORAL at 09:52

## 2024-02-18 NOTE — DISCHARGE INSTRUCTIONS
Increase PO fluid intake  Return ED fever, headache, vomiting, dehydration, worse condition, any other concerns

## 2024-02-18 NOTE — FSED PROVIDER NOTE
Subjective   History of Present Illness  72yo female pmh significant htn/hyperlipidemia/gerd/Barretts esophagus, presents ED c/o onset nausea/vomiting yesterday x5 episodes with onset generalized headache this morning.  ROS (+) nausea.  Denies fever/trauma/otalgia/rhinorrhea/cough/hematemesis/hematochoezia/melena/diarrhea/dysuria.    History provided by:  Patient  Vomiting  The primary symptoms include nausea and vomiting. Primary symptoms do not include abdominal pain or diarrhea.       Review of Systems   Constitutional: Negative.    Eyes: Negative.    Respiratory: Negative.     Cardiovascular: Negative.    Gastrointestinal:  Positive for nausea and vomiting. Negative for abdominal distention, abdominal pain, blood in stool and diarrhea.   Genitourinary: Negative.    Allergic/Immunologic: Negative for immunocompromised state.   Neurological:  Positive for headaches.   All other systems reviewed and are negative.      Past Medical History:   Diagnosis Date    Allergic 1980    Sulfa    Anxiety     Pedro's esophagus without dysplasia 04/08/2021    GERD (gastroesophageal reflux disease)     Heart murmur ?    Hernia     Hyperlipidemia     Hypertension        Allergies   Allergen Reactions    Sulfa Antibiotics Anaphylaxis     anaphylaxis    Atacand [Candesartan] Hives    Iodinated Contrast Media Rash       Past Surgical History:   Procedure Laterality Date    ABDOMINOPLASTY      BREAST SURGERY Bilateral     breast reduction    CATARACT EXTRACTION W/ INTRAOCULAR LENS IMPLANT Bilateral     CHOLECYSTECTOMY      COLONOSCOPY  02/2016    normal per pt     COLONOSCOPY N/A 12/16/2022    Procedure: COLONOSCOPY TO CECUM/TI WITH COLD SNARE POLYPECTOMIES AND RANDOM COLON BIOPSIES;  Surgeon: Julianne White MD;  Location: [facility] ENDOSCOPY;  Service: Gastroenterology;  Laterality: N/A;  pre: LOWER ABDOMINAL PAIN, CHANGE IN BOWEL  post: POLYPS, HEMORRHOIDS    COSMETIC SURGERY  1995    ENDOSCOPY N/A 05/02/2017    large HH,  gastritis, multiple gastric polyps, nodule in duodenum    ENDOSCOPY N/A 03/15/2021    Procedure: ESOPHAGOGASTRODUODENOSCOPY WITH COLD BX'S;  Surgeon: Julianne White MD;  Location: [facility] ENDOSCOPY;  Service: Gastroenterology;  Laterality: N/A;  pre: hx hiatal hernia, GERD  post: hiatal hernia, gastritis, gastric plyps, lymphangiectasia.     EYE SURGERY  2015    HYSTERECTOMY      MOHS SURGERY  2017    TUBAL ABDOMINAL LIGATION      UPPER GASTROINTESTINAL ENDOSCOPY         Family History   Problem Relation Age of Onset    Hyperlipidemia Mother     Stroke Mother     Heart disease Father     Stroke Sister     Inflammatory bowel disease Brother        Social History     Socioeconomic History    Marital status:    Tobacco Use    Smoking status: Never    Smokeless tobacco: Never   Vaping Use    Vaping Use: Never used   Substance and Sexual Activity    Alcohol use: Yes     Alcohol/week: 20.0 standard drinks of alcohol     Types: 20 Glasses of wine per week     Comment: SOCIAL    Drug use: No    Sexual activity: Yes     Partners: Male     Birth control/protection: Post-menopausal           Objective   Physical Exam  Vitals and nursing note reviewed.   Constitutional:       Appearance: Normal appearance.   HENT:      Head: Normocephalic and atraumatic.      Right Ear: Tympanic membrane, ear canal and external ear normal.      Left Ear: Tympanic membrane, ear canal and external ear normal.      Nose: Nose normal. No rhinorrhea.      Mouth/Throat:      Mouth: Mucous membranes are moist.      Pharynx: Oropharynx is clear. No oropharyngeal exudate or posterior oropharyngeal erythema.   Eyes:      Extraocular Movements: Extraocular movements intact.      Conjunctiva/sclera: Conjunctivae normal.      Pupils: Pupils are equal, round, and reactive to light.   Neck:      Trachea: Trachea and phonation normal.      Meningeal: Brudzinski's sign absent.   Cardiovascular:      Rate and Rhythm: Normal rate and regular rhythm.       Pulses: Normal pulses.      Heart sounds: Murmur heard.       with a grade of 2/6.      No friction rub. No gallop.   Pulmonary:      Effort: Pulmonary effort is normal. No respiratory distress.      Breath sounds: Normal breath sounds. No wheezing, rhonchi or rales.   Abdominal:      General: Abdomen is flat. Bowel sounds are normal. There is no distension.      Palpations: Abdomen is soft.      Tenderness: There is no abdominal tenderness. There is no right CVA tenderness, left CVA tenderness, guarding or rebound.   Musculoskeletal:         General: No swelling or deformity. Normal range of motion.      Cervical back: Full passive range of motion without pain, normal range of motion and neck supple. No rigidity.      Right lower leg: No edema.      Left lower leg: No edema.   Lymphadenopathy:      Cervical: No cervical adenopathy.   Skin:     General: Skin is warm and dry.   Neurological:      General: No focal deficit present.      Mental Status: She is alert and oriented to person, place, and time.      GCS: GCS eye subscore is 4. GCS verbal subscore is 5. GCS motor subscore is 6.      Cranial Nerves: Cranial nerves 2-12 are intact.      Sensory: Sensation is intact.      Motor: Motor function is intact.      Coordination: Coordination is intact.         Procedures           ED Course      Labs Reviewed   COMPREHENSIVE METABOLIC PANEL - Abnormal; Notable for the following components:       Result Value    Glucose 129 (*)     Creatinine 0.55 (*)     Potassium 3.0 (*)     Chloride 97 (*)     All other components within normal limits    Narrative:     GFR Normal >60  Chronic Kidney Disease <60  Kidney Failure <15    The GFR formula is only valid for adults with stable renal function between ages 18 and 70.   URINALYSIS W/ MICROSCOPIC IF INDICATED (NO CULTURE) - Abnormal; Notable for the following components:    Appearance, UA Cloudy (*)     Ketones, UA Trace (*)     Bilirubin, UA Small (1+) (*)     Protein,   mg/dL (2+) (*)     Leuk Esterase, UA Small (1+) (*)     All other components within normal limits   COVID-19 AND FLU A/B, NP SWAB IN TRANSPORT MEDIA 1 HR TAT - Normal    Narrative:     Fact sheet for providers: https://www.fda.gov/media/902707/download    Fact sheet for patients: https://www.fda.gov/media/921660/download    Test performed by PCR.   LIPASE - Normal   CBC WITH AUTO DIFFERENTIAL - Normal   MAGNESIUM - Normal   URINALYSIS, MICROSCOPIC ONLY   RAINBOW URINE CULTURE TUBE   CBC AND DIFFERENTIAL    Narrative:     The following orders were created for panel order CBC & Differential.  Procedure                               Abnormality         Status                     ---------                               -----------         ------                     CBC Auto Differential[[id number]]        Normal              Final result                 Please view results for these tests on the individual orders.     CT Head Without Contrast    Result Date: 2/18/2024  Narrative: CT SCAN OF THE BRAIN WITHOUT CONTRAST  HISTORY: Generalized headache. Vomiting.  The CT scan was performed as an emergency procedure through the brain without contrast. There is mild diffuse atrophy and chronic small vessel ischemic change similar to the study of 6/22/2019. There is no evidence of acute intracranial hemorrhage or mass effect. There is some very minimal mucosal thickening involving both maxillary sinuses that is unchanged. The mastoid air cells are clear.      Radiation dose reduction techniques were utilized, including automated exposure control and exposure modulation based on body size.   This report was finalized on 2/18/2024 9:34 AM by Dr. Rigo De Leon M.D on Workstation: [id number]      XR Chest PA & Lateral    Result Date: 2/8/2024  Narrative: XR CHEST PA AND LATERAL-2/7/2024  HISTORY: Cough.  Heart size is within normal limits. The lungs appear free of acute infiltrates. Right upper quadrant surgical clips are seen. There  is deformity of the proximal left humerus likely from an old healed fracture.      Impression: 1. No acute process.  This report was finalized on 2/8/2024 10:11 AM by Dr. Lm Vyas M.D on Workstation: [id number]                                          Medical Decision Making  Labs/radiographic findings reviewed.  CBC: unremarkable/no leukocytosis.  CMP: remarkable K 3.0meq/L.  pt given kcl 40meq po in ED.  UA: small LE (pt without urinary symptoms).  CT head: negative.  Covid19/influenza: neg.  Magnesium: normal.  Pt given NS 1L bolus/compazine 10mg iv/benadryl 25mg iv in ED with complete resolution symptoms.  AFVSS/no evidence meningismus. Normal neuro exam. Stable discharge with pmd followup.  Plan prn zofran/prn naproxen/kcl 20meq po daily x2d.  Return precautions provided.    Problems Addressed:  Acute nonintractable headache, unspecified headache type: complicated acute illness or injury  Hypokalemia: complicated acute illness or injury  Nausea and vomiting, unspecified vomiting type: complicated acute illness or injury    Amount and/or Complexity of Data Reviewed  Labs: ordered.  Radiology: ordered.    Risk  Prescription drug management.        Final diagnoses:   Acute nonintractable headache, unspecified headache type   Nausea and vomiting, unspecified vomiting type   Hypokalemia       ED Disposition  ED Disposition       ED Disposition   Discharge    Condition   Good    Comment   --               Mary Urbano, APRN  [address] [id number]  [phone]    In 1 day           Medication List        New Prescriptions      naproxen 500 MG tablet  Commonly known as: NAPROSYN  Take 1 tablet by mouth 2 (Two) Times a Day As Needed for Headache.     ondansetron ODT 4 MG disintegrating tablet  Commonly known as: ZOFRAN-ODT  Place 1 tablet on the tongue Every 8 (Eight) Hours As Needed for Nausea or Vomiting.     potassium chloride ER 20 MEQ tablet controlled-release ER tablet  Commonly known as:  K-TAB  Take 1 tablet by mouth Daily for 2 days.               Where to Get Your Medications        These medications were sent to [facility]/pharmacy #[id number] - [place], [place] - [address] AT [facility] - [phone]  - [phone]   [address], [facility] [address]      Phone: [phone]   naproxen 500 MG tablet  ondansetron ODT 4 MG disintegrating tablet  potassium chloride ER 20 MEQ tablet controlled-release ER tablet

## 2024-04-10 ENCOUNTER — OFFICE VISIT (OUTPATIENT)
Dept: GASTROENTEROLOGY | Facility: CLINIC | Age: 74
End: 2024-04-10
Payer: MEDICARE

## 2024-04-10 ENCOUNTER — TELEPHONE (OUTPATIENT)
Dept: GASTROENTEROLOGY | Facility: CLINIC | Age: 74
End: 2024-04-10
Payer: MEDICARE

## 2024-04-10 VITALS
WEIGHT: 146 LBS | TEMPERATURE: 96.8 F | BODY MASS INDEX: 24.32 KG/M2 | SYSTOLIC BLOOD PRESSURE: 146 MMHG | HEART RATE: 86 BPM | DIASTOLIC BLOOD PRESSURE: 84 MMHG | HEIGHT: 65 IN | OXYGEN SATURATION: 95 %

## 2024-04-10 DIAGNOSIS — K22.70 BARRETT'S ESOPHAGUS WITHOUT DYSPLASIA: Primary | ICD-10-CM

## 2024-04-10 DIAGNOSIS — K44.9 HIATAL HERNIA: ICD-10-CM

## 2024-04-10 DIAGNOSIS — K21.9 GASTROESOPHAGEAL REFLUX DISEASE, UNSPECIFIED WHETHER ESOPHAGITIS PRESENT: ICD-10-CM

## 2024-04-10 PROCEDURE — 1160F RVW MEDS BY RX/DR IN RCRD: CPT | Performed by: INTERNAL MEDICINE

## 2024-04-10 PROCEDURE — 3077F SYST BP >= 140 MM HG: CPT | Performed by: INTERNAL MEDICINE

## 2024-04-10 PROCEDURE — 3079F DIAST BP 80-89 MM HG: CPT | Performed by: INTERNAL MEDICINE

## 2024-04-10 PROCEDURE — 99214 OFFICE O/P EST MOD 30 MIN: CPT | Performed by: INTERNAL MEDICINE

## 2024-04-10 PROCEDURE — 1159F MED LIST DOCD IN RCRD: CPT | Performed by: INTERNAL MEDICINE

## 2024-04-10 RX ORDER — OMEPRAZOLE 20 MG/1
20 CAPSULE, DELAYED RELEASE ORAL DAILY
COMMUNITY

## 2024-04-10 NOTE — PROGRESS NOTES
Chief Complaint   Patient presents with    Heartburn     gerd     Subjective     HPI  Teagan Cates is a 74 y.o. female who presents today for office follow up.     She has hx of SSBE followed previously by Dr White.  Last EGD 2021 reviewed - 3cm HH, irregular Z line, path c/w IM with no dysplasia  GERD well controlled on low dose Omeprazole    Objective   Vitals:    04/10/24 0928   BP: 146/84   Pulse: 86   Temp: 96.8 °F (36 °C)   SpO2: 95%       Physical Exam  Vitals reviewed.   Constitutional:       Appearance: She is well-developed.   HENT:      Head: Normocephalic and atraumatic.   Neurological:      Mental Status: She is alert and oriented to person, place, and time.   Psychiatric:         Behavior: Behavior normal.         Thought Content: Thought content normal.         Judgment: Judgment normal.       The following data was reviewed by: Dwayne Gao MD on 04/10/2024:  Common labs          11/1/2023    11:04 12/6/2023    08:11 2/18/2024    09:05   Common Labs   Glucose 105  135  129    BUN 11  12  10    Creatinine 0.63  0.66  0.55    Sodium 140  142  139    Potassium 3.5  3.6  3.0    Chloride 95  101  97    Calcium 10.9  10.2  10.4    Total Protein  7.4     Albumin  5.0  4.7    Total Bilirubin  0.4  0.4    Alkaline Phosphatase  55  56    AST (SGOT)  38  23    ALT (SGPT)  47  25    WBC  11.64  9.18    Hemoglobin  14.1  13.9    Hematocrit  41.9  40.6    Platelets  417  367    Total Cholesterol  166     Triglycerides  94     HDL Cholesterol  64     LDL Cholesterol   85     Hemoglobin A1C  6.30       Data reviewed : GI studies EGD and path from 3/2021      Assessment & Plan   Assessment:     1. Pedro's esophagus without dysplasia    2. Hiatal hernia    3. Gastroesophageal reflux disease, unspecified whether esophagitis present         Plan:   Continue current dose Omeprazole  Schedule EGD for Pedro's surveillance            Dwayne Gao M.D.  [facility]  [address]  [place] [address]  Office: [phone]

## 2024-04-10 NOTE — TELEPHONE ENCOUNTER
ESHA THOMPSON IN SCHEDULING PT SCHEDULED 06/26/2024 ARRIVING AT 1;30PM EGD PREP INFORMATION HANDED TO THE PT OK FOR HUB TO RELAY

## 2024-04-14 DIAGNOSIS — E78.2 MIXED HYPERLIPIDEMIA: ICD-10-CM

## 2024-04-15 RX ORDER — ROSUVASTATIN CALCIUM 20 MG/1
TABLET, COATED ORAL
Qty: 90 TABLET | Refills: 1 | Status: SHIPPED | OUTPATIENT
Start: 2024-04-15

## 2024-04-17 ENCOUNTER — HOSPITAL ENCOUNTER (OUTPATIENT)
Dept: RESPIRATORY THERAPY | Facility: HOSPITAL | Age: 74
Discharge: HOME | End: 2024-04-17
Admitting: NURSE PRACTITIONER
Payer: MEDICARE

## 2024-04-17 DIAGNOSIS — R05.3 CHRONIC COUGH: ICD-10-CM

## 2024-04-17 PROCEDURE — 94726 PLETHYSMOGRAPHY LUNG VOLUMES: CPT

## 2024-04-17 PROCEDURE — 94060 EVALUATION OF WHEEZING: CPT

## 2024-04-17 PROCEDURE — 94729 DIFFUSING CAPACITY: CPT

## 2024-04-17 RX ORDER — ALBUTEROL SULFATE 2.5 MG/3ML
2.5 SOLUTION RESPIRATORY (INHALATION) ONCE AS NEEDED
Status: COMPLETED | OUTPATIENT
Start: 2024-04-17 | End: 2024-04-17

## 2024-04-17 RX ADMIN — ALBUTEROL SULFATE 2.5 MG: 2.5 SOLUTION RESPIRATORY (INHALATION) at 11:09

## 2024-04-30 DIAGNOSIS — I10 BENIGN ESSENTIAL HTN: ICD-10-CM

## 2024-05-01 DIAGNOSIS — E87.6 HYPOKALEMIA: Primary | ICD-10-CM

## 2024-05-03 LAB
BUN SERPL-MCNC: 11 MG/DL (ref 8–23)
BUN/CREAT SERPL: 17.7 (ref 7–25)
CALCIUM SERPL-MCNC: 9.9 MG/DL (ref 8.6–10.5)
CHLORIDE SERPL-SCNC: 98 MMOL/L (ref 98–107)
CO2 SERPL-SCNC: 26.3 MMOL/L (ref 22–29)
CREAT SERPL-MCNC: 0.62 MG/DL (ref 0.57–1)
EGFRCR SERPLBLD CKD-EPI 2021: 94.2 ML/MIN/1.73
GLUCOSE SERPL-MCNC: 112 MG/DL (ref 65–99)
POTASSIUM SERPL-SCNC: 3.8 MMOL/L (ref 3.5–5.2)
SODIUM SERPL-SCNC: 139 MMOL/L (ref 136–145)

## 2024-05-06 DIAGNOSIS — I10 BENIGN ESSENTIAL HTN: ICD-10-CM

## 2024-05-06 RX ORDER — HYDROCHLOROTHIAZIDE 25 MG/1
25 TABLET ORAL DAILY
Qty: 90 TABLET | Refills: 1 | Status: SHIPPED | OUTPATIENT
Start: 2024-05-06

## 2024-05-06 RX ORDER — HYDROCHLOROTHIAZIDE 25 MG/1
25 TABLET ORAL DAILY
Qty: 90 TABLET | Refills: 0 | OUTPATIENT
Start: 2024-05-06

## 2024-05-10 DIAGNOSIS — R05.3 CHRONIC COUGH: ICD-10-CM

## 2024-05-10 DIAGNOSIS — R73.03 PREDIABETES: ICD-10-CM

## 2024-05-10 DIAGNOSIS — I10 BENIGN ESSENTIAL HTN: ICD-10-CM

## 2024-05-10 DIAGNOSIS — K22.70 BARRETT'S ESOPHAGUS WITHOUT DYSPLASIA: ICD-10-CM

## 2024-05-10 RX ORDER — RABEPRAZOLE SODIUM 20 MG/1
20 TABLET, DELAYED RELEASE ORAL DAILY
Qty: 90 TABLET | Refills: 0 | OUTPATIENT
Start: 2024-05-10

## 2024-05-10 NOTE — TELEPHONE ENCOUNTER
[facility] Rx Refill Note  Requested Prescriptions     Pending Prescriptions Disp Refills    RABEprazole (ACIPHEX) 20 MG EC tablet [Pharmacy Med Name: RABEPRAZOLE SOD DR 20 MG TAB] 90 tablet 0     Sig: TAKE 1 TABLET BY MOUTH EVERY DAY      Last office visit with prescribing clinician: 1/8/2024   Last telemedicine visit with prescribing clinician: Visit date not found   Next office visit with prescribing clinician: 6/26/2024                         Would you like a call back once the refill request has been completed: [] Yes [] No    If the office needs to give you a call back, can they leave a voicemail: [] Yes [] No    Shilpi Sparks MA  05/10/24, 08:48 EDT

## 2024-05-13 RX ORDER — NIFEDIPINE 30 MG/1
30 TABLET, EXTENDED RELEASE ORAL DAILY
Qty: 90 TABLET | Refills: 1 | Status: SHIPPED | OUTPATIENT
Start: 2024-05-13

## 2024-05-13 RX ORDER — OMEPRAZOLE 40 MG/1
40 CAPSULE, DELAYED RELEASE ORAL
Qty: 90 CAPSULE | Refills: 1 | Status: SHIPPED | OUTPATIENT
Start: 2024-05-13

## 2024-05-13 RX ORDER — METFORMIN HYDROCHLORIDE 500 MG/1
500 TABLET, EXTENDED RELEASE ORAL
Qty: 90 TABLET | Refills: 1 | Status: SHIPPED | OUTPATIENT
Start: 2024-05-13

## 2024-05-13 RX ORDER — VENLAFAXINE HYDROCHLORIDE 150 MG/1
CAPSULE, EXTENDED RELEASE ORAL
Qty: 90 CAPSULE | Refills: 1 | Status: SHIPPED | OUTPATIENT
Start: 2024-05-13

## 2024-06-03 RX ORDER — VENLAFAXINE HYDROCHLORIDE 150 MG/1
CAPSULE, EXTENDED RELEASE ORAL
Qty: 90 CAPSULE | Refills: 1 | Status: SHIPPED | OUTPATIENT
Start: 2024-06-03

## 2024-06-25 ENCOUNTER — TELEPHONE (OUTPATIENT)
Dept: GASTROENTEROLOGY | Facility: CLINIC | Age: 74
End: 2024-06-25

## 2024-06-25 NOTE — TELEPHONE ENCOUNTER
"Hub staff attempted to follow warm transfer process and was unsuccessful     Caller: Teagan Cates \"Teagan Cates\"    Relationship to patient: Self    Best call back number: [phone]    Patient is needing: PT CALLED AND NEEDS TO RESCHEDULE HER PROCEDURE FOR TOMORROW 6/26/24. PLEASE ADVISE.    "

## 2024-06-26 ENCOUNTER — OFFICE VISIT (OUTPATIENT)
Dept: FAMILY MEDICINE CLINIC | Facility: CLINIC | Age: 74
End: 2024-06-26
Payer: MEDICARE

## 2024-06-26 VITALS
SYSTOLIC BLOOD PRESSURE: 122 MMHG | TEMPERATURE: 96.5 F | RESPIRATION RATE: 14 BRPM | HEART RATE: 77 BPM | OXYGEN SATURATION: 98 % | BODY MASS INDEX: 23.21 KG/M2 | HEIGHT: 65 IN | DIASTOLIC BLOOD PRESSURE: 60 MMHG | WEIGHT: 139.3 LBS

## 2024-06-26 DIAGNOSIS — R73.03 PREDIABETES: ICD-10-CM

## 2024-06-26 DIAGNOSIS — G44.209 ACUTE NON INTRACTABLE TENSION-TYPE HEADACHE: ICD-10-CM

## 2024-06-26 DIAGNOSIS — H65.03 BILATERAL ACUTE SEROUS OTITIS MEDIA, RECURRENCE NOT SPECIFIED: ICD-10-CM

## 2024-06-26 DIAGNOSIS — I10 BENIGN ESSENTIAL HTN: Primary | ICD-10-CM

## 2024-06-26 PROCEDURE — 99213 OFFICE O/P EST LOW 20 MIN: CPT | Performed by: NURSE PRACTITIONER

## 2024-06-26 PROCEDURE — 3078F DIAST BP <80 MM HG: CPT | Performed by: NURSE PRACTITIONER

## 2024-06-26 PROCEDURE — 1160F RVW MEDS BY RX/DR IN RCRD: CPT | Performed by: NURSE PRACTITIONER

## 2024-06-26 PROCEDURE — 1125F AMNT PAIN NOTED PAIN PRSNT: CPT | Performed by: NURSE PRACTITIONER

## 2024-06-26 PROCEDURE — 1159F MED LIST DOCD IN RCRD: CPT | Performed by: NURSE PRACTITIONER

## 2024-06-26 PROCEDURE — G2211 COMPLEX E/M VISIT ADD ON: HCPCS | Performed by: NURSE PRACTITIONER

## 2024-06-26 PROCEDURE — 3074F SYST BP LT 130 MM HG: CPT | Performed by: NURSE PRACTITIONER

## 2024-06-26 RX ORDER — AZELASTINE 1 MG/ML
1 SPRAY, METERED NASAL 2 TIMES DAILY
Qty: 1 EACH | Refills: 0 | Status: SHIPPED | OUTPATIENT
Start: 2024-06-26 | End: 2024-07-10

## 2024-06-26 NOTE — PATIENT INSTRUCTIONS
Hypertension: stable, continue HCTZ 25 mg 1 tab daily and procardia XL 30 mg 1 tab daily     Pre diabetes: stable, HgA1c 5.8 from previous 6.3, continue metformin  mg 1 tab daily, continue to eat a heart healthy low fat low sugar diet, drink plenty of water with goal 64 oz a day and exercise 3-5 times a week, for more than 30 minutes at a time    Tension headache: continue to use Aleve prn, recommend to try to relax and lie down in dark quiet room when able when having. Recommend trying to de-stress life with lifestyle modifications as able.    Serous otitis: azelastine nasal spray daily for 2 weeks. Watch for alterations in equilibrium.

## 2024-06-26 NOTE — PROGRESS NOTES
Subjective     Teagan Cates is a 74 y.o.. female.     Patient here today for follow up on hypertension and pre diabetes. Patient c/o headaches four times a week for past 2 weeks. Patient stating headaches are on the right side temporal area and feels like a band of pain. Patient stating she has been going to chiropractor for 2 months for neck pain. Patient stating she has a grandchild that has been in hospital for illness recently and is going to be discharged soon. Patient stating she ocassionally takes Aleve and helps headache go away. Patient stating headache goes away on its own as well. Patient stating she has been backing off of caffeine some recently.           The following portions of the patient's history were reviewed and updated as appropriate: allergies, current medications, past family history, past medical history, past social history, past surgical history and problem list.    Past Medical History:   Diagnosis Date    Allergic 1980    Sulfa    Anxiety     Pedro's esophagus without dysplasia 04/08/2021    Diabetes mellitus     GERD (gastroesophageal reflux disease)     Heart murmur ?    Hernia     Hyperlipidemia     Hypertension        Past Surgical History:   Procedure Laterality Date    ABDOMINOPLASTY      BREAST SURGERY Bilateral     breast reduction    CATARACT EXTRACTION W/ INTRAOCULAR LENS IMPLANT Bilateral     CHOLECYSTECTOMY      COLONOSCOPY  02/2016    normal per pt     COLONOSCOPY N/A 12/16/2022    Procedure: COLONOSCOPY TO CECUM/TI WITH COLD SNARE POLYPECTOMIES AND RANDOM COLON BIOPSIES;  Surgeon: Julianne White MD;  Location: [facility] ENDOSCOPY;  Service: Gastroenterology;  Laterality: N/A;  pre: LOWER ABDOMINAL PAIN, CHANGE IN BOWEL  post: POLYPS, HEMORRHOIDS    COSMETIC SURGERY  1995    ENDOSCOPY N/A 05/02/2017    large HH, gastritis, multiple gastric polyps, nodule in duodenum    ENDOSCOPY N/A 03/15/2021    Procedure: ESOPHAGOGASTRODUODENOSCOPY WITH COLD BX'S;  Surgeon: Cindy  "Julianne NEAL MD;  Location: [facility] ENDOSCOPY;  Service: Gastroenterology;  Laterality: N/A;  pre: hx hiatal hernia, GERD  post: hiatal hernia, gastritis, gastric plyps, lymphangiectasia.     EYE SURGERY  2015    CATARACTS    HYSTERECTOMY      MOHS SURGERY  2017    TUBAL ABDOMINAL LIGATION      UPPER GASTROINTESTINAL ENDOSCOPY      2021       Review of Systems   HENT:  Negative for congestion, ear pain, rhinorrhea and sore throat.    Eyes:  Negative for photophobia and visual disturbance.   Gastrointestinal:  Negative for abdominal pain, nausea and vomiting.   Neurological:  Positive for headaches. Negative for dizziness and light-headedness.   Psychiatric/Behavioral:  Negative for dysphoric mood and sleep disturbance. The patient is not nervous/anxious.        Allergies   Allergen Reactions    Sulfa Antibiotics Anaphylaxis     anaphylaxis    Atacand [Candesartan] Hives    Iodinated Contrast Media Rash       Objective     Vitals:    06/26/24 0857   BP: 122/60   Pulse: 77   Resp: 14   Temp: 96.5 °F (35.8 °C)   TempSrc: Temporal   SpO2: 98%   Weight: 63.2 kg (139 lb 4.8 oz)   Height: 165.1 cm (65\")     Body mass index is 23.18 kg/m².    Physical Exam  Vitals reviewed.   Constitutional:       Appearance: She is well-developed.   HENT:      Head: Normocephalic and atraumatic.      Right Ear: Tympanic membrane normal. A middle ear effusion (clear fluid noted) is present. Tympanic membrane is not erythematous.      Left Ear: Tympanic membrane normal. A middle ear effusion (clear fluid noted) is present. Tympanic membrane is not erythematous.      Nose: Nose normal.      Mouth/Throat:      Mouth: Mucous membranes are moist.      Pharynx: No pharyngeal swelling, oropharyngeal exudate or posterior oropharyngeal erythema.   Eyes:      Conjunctiva/sclera: Conjunctivae normal.      Pupils: Pupils are equal, round, and reactive to light.   Neck:      Vascular: No carotid bruit.   Cardiovascular:      Rate and Rhythm: Normal rate and " regular rhythm.      Pulses: Normal pulses.      Heart sounds: No murmur heard.  Pulmonary:      Effort: Pulmonary effort is normal.      Breath sounds: No wheezing, rhonchi or rales.   Musculoskeletal:         General: Normal range of motion.      Right lower leg: No edema.      Left lower leg: No edema.   Lymphadenopathy:      Cervical: No cervical adenopathy.   Skin:     General: Skin is warm and dry.   Neurological:      Mental Status: She is alert and oriented to person, place, and time.           Current Outpatient Medications:     hydroCHLOROthiazide 25 MG tablet, Take 1 tablet by mouth Daily. (Patient taking differently: Take 1 tablet by mouth Daily. TAKES AT BEDTIME), Disp: 90 tablet, Rfl: 1    metFORMIN ER (GLUCOPHAGE-XR) 500 MG 24 hr tablet, TAKE 1 TABLET BY MOUTH EVERY DAY WITH BREAKFAST, Disp: 90 tablet, Rfl: 1    NIFEdipine XL (PROCARDIA XL) 30 MG 24 hr tablet, TAKE 1 TABLET BY MOUTH EVERY DAY, Disp: 90 tablet, Rfl: 1    omeprazole (priLOSEC) 40 MG capsule, TAKE 1 CAPSULE BY MOUTH EVERY DAY IN THE MORNING BEFORE BREAKFAST, Disp: 90 capsule, Rfl: 1    rosuvastatin (CRESTOR) 20 MG tablet, TAKE 1 TABLET BY MOUTH EVERY DAY (Patient taking differently: Take 1 tablet by mouth Every Night.), Disp: 90 tablet, Rfl: 1    venlafaxine XR (EFFEXOR-XR) 150 MG 24 hr capsule, TAKE 1 CAPSULE BY MOUTH EVERY DAY (Patient taking differently: Take 1 capsule by mouth Every Night.), Disp: 90 capsule, Rfl: 1    azelastine (ASTELIN) 0.1 % nasal spray, 1 spray into the nostril(s) as directed by provider 2 (Two) Times a Day for 14 days. Use in each nostril as directed, Disp: 1 each, Rfl: 0    Recent Results (from the past 2016 hour(s))   Basic metabolic panel    Collection Time: 05/03/24 10:46 AM    Specimen: Blood   Result Value Ref Range    Glucose 112 (H) 65 - 99 mg/dL    BUN 11 8 - 23 mg/dL    Creatinine 0.62 0.57 - 1.00 mg/dL    EGFR Result 94.2 >60.0 mL/min/1.73    BUN/Creatinine Ratio 17.7 7.0 - 25.0    Sodium 139 136 -  145 mmol/L    Potassium 3.8 3.5 - 5.2 mmol/L    Chloride 98 98 - 107 mmol/L    Total CO2 26.3 22.0 - 29.0 mmol/L    Calcium 9.9 8.6 - 10.5 mg/dL   Comprehensive metabolic panel    Collection Time: 06/21/24  8:28 AM    Specimen: Blood   Result Value Ref Range    Glucose 123 (H) 65 - 99 mg/dL    BUN 11 8 - 23 mg/dL    Creatinine 0.66 0.57 - 1.00 mg/dL    EGFR Result 92.8 >60.0 mL/min/1.73    BUN/Creatinine Ratio 16.7 7.0 - 25.0    Sodium 142 136 - 145 mmol/L    Potassium 3.6 3.5 - 5.2 mmol/L    Chloride 100 98 - 107 mmol/L    Total CO2 29.0 22.0 - 29.0 mmol/L    Calcium 10.1 8.6 - 10.5 mg/dL    Total Protein 7.5 6.0 - 8.5 g/dL    Albumin 4.8 3.5 - 5.2 g/dL    Globulin 2.7 gm/dL    A/G Ratio 1.8 g/dL    Total Bilirubin 0.5 0.0 - 1.2 mg/dL    Alkaline Phosphatase 48 39 - 117 U/L    AST (SGOT) 24 1 - 32 U/L    ALT (SGPT) 23 1 - 33 U/L   Hemoglobin A1c    Collection Time: 06/21/24  8:28 AM    Specimen: Blood   Result Value Ref Range    Hemoglobin A1C 5.80 (H) 4.80 - 5.60 %       Complete PFT - Pre & Post Bronchodilator  Flow-volume loops reviewed and demonstrate reasonably good effort probably   not ideal.  The FEV1 and FVC are both within normal limits the FEV1 to FVC   ratio is slightly increased this change can be a normal variant, can be   seen with suboptimal effort or can be seen with restriction lung volumes   do not support restriction I cannot tell you the other 2 whether this is   just suboptimal effort or normal variant.  There was no significant   response noted to single-dose bronchodilator challenge.  Lung volumes   including total lung capacity and residual are within normal limits.  The   diffusion capacity for carbon monoxide was within normal limits.    Impression: Pre and postbronchodilator spirometry likely normal and, lung   volume testing and diffusion capacity are all within normal limits.        Diagnoses and all orders for this visit:    1. Benign essential HTN (Primary)    2. Prediabetes    3.  Acute non intractable tension-type headache    4. Bilateral acute serous otitis media, recurrence not specified  -     azelastine (ASTELIN) 0.1 % nasal spray; 1 spray into the nostril(s) as directed by provider 2 (Two) Times a Day for 14 days. Use in each nostril as directed  Dispense: 1 each; Refill: 0        There are no Patient Instructions on file for this visit.    No follow-ups on file.

## 2024-08-26 DIAGNOSIS — H65.03 BILATERAL ACUTE SEROUS OTITIS MEDIA, RECURRENCE NOT SPECIFIED: ICD-10-CM

## 2024-08-26 RX ORDER — AZELASTINE HYDROCHLORIDE 137 UG/1
SPRAY, METERED NASAL
Qty: 30 ML | Refills: 0 | Status: SHIPPED | OUTPATIENT
Start: 2024-08-26

## 2024-09-23 DIAGNOSIS — H65.03 BILATERAL ACUTE SEROUS OTITIS MEDIA, RECURRENCE NOT SPECIFIED: ICD-10-CM

## 2024-09-23 RX ORDER — AZELASTINE HYDROCHLORIDE 137 UG/1
1 SPRAY, METERED NASAL 2 TIMES DAILY
Qty: 30 ML | Refills: 0 | Status: SHIPPED | OUTPATIENT
Start: 2024-09-23

## 2024-10-12 DIAGNOSIS — R73.03 PREDIABETES: ICD-10-CM

## 2024-10-12 DIAGNOSIS — E78.2 MIXED HYPERLIPIDEMIA: ICD-10-CM

## 2024-10-14 RX ORDER — ROSUVASTATIN CALCIUM 20 MG/1
TABLET, COATED ORAL
Qty: 90 TABLET | Refills: 1 | Status: SHIPPED | OUTPATIENT
Start: 2024-10-14

## 2024-10-14 RX ORDER — METFORMIN HCL 500 MG
500 TABLET, EXTENDED RELEASE 24 HR ORAL
Qty: 90 TABLET | Refills: 1 | Status: SHIPPED | OUTPATIENT
Start: 2024-10-14

## 2024-10-14 NOTE — TELEPHONE ENCOUNTER
Rx Refill Note  Requested Prescriptions     Pending Prescriptions Disp Refills    rosuvastatin (CRESTOR) 20 MG tablet [Pharmacy Med Name: ROSUVASTATIN CALCIUM 20 MG TAB] 90 tablet 1     Sig: TAKE 1 TABLET BY MOUTH EVERY DAY    metFORMIN ER (GLUCOPHAGE-XR) 500 MG 24 hr tablet [Pharmacy Med Name: METFORMIN HCL  MG TABLET] 90 tablet 1     Sig: TAKE 1 TABLET BY MOUTH EVERY DAY WITH BREAKFAST      Last office visit with prescribing clinician: 6/26/2024   Last telemedicine visit with prescribing clinician: Visit date not found   Next office visit with prescribing clinician: 12/13/2024                         Would you like a call back once the refill request has been completed: [] Yes [] No    If the office needs to give you a call back, can they leave a voicemail: [] Yes [] No    Janene Frank MA  10/14/24, 11:14 EDT

## 2024-10-30 DIAGNOSIS — I10 BENIGN ESSENTIAL HTN: ICD-10-CM

## 2024-10-30 RX ORDER — HYDROCHLOROTHIAZIDE 25 MG/1
25 TABLET ORAL DAILY
Qty: 90 TABLET | Refills: 1 | Status: SHIPPED | OUTPATIENT
Start: 2024-10-30

## 2024-11-22 RX ORDER — VENLAFAXINE HYDROCHLORIDE 150 MG/1
150 CAPSULE, EXTENDED RELEASE ORAL NIGHTLY
Qty: 90 CAPSULE | Refills: 0 | Status: SHIPPED | OUTPATIENT
Start: 2024-11-22

## 2024-11-22 NOTE — TELEPHONE ENCOUNTER
"  Caller: Teagan Cates \"Teaganraisa Tillmant\"    Relationship: Self    Best call back number: [phone]     Requested Prescriptions:   Requested Prescriptions     Pending Prescriptions Disp Refills    venlafaxine XR (EFFEXOR-XR) 150 MG 24 hr capsule 90 capsule 1     Sig: Take 1 capsule by mouth Daily.        Pharmacy where request should be sent: [facility]/PHARMACY #[id number] [place], [place] - [address] [address] AT [facility] [phone] [facility] [phone]      Last office visit with prescribing clinician: 6/26/2024   Last telemedicine visit with prescribing clinician: Visit date not found   Next office visit with prescribing clinician: 12/13/2024     Additional details provided by patient: 4 TABLETS ON HAND    Does the patient have less than a 3 day supply:  [] Yes  [x] No    Would you like a call back once the refill request has been completed: [] Yes [] No    If the office needs to give you a call back, can they leave a voicemail: [] Yes [] No    Francisco J Mesa Rep   11/22/24 12:27 EST         "

## 2024-12-04 DIAGNOSIS — E78.2 MIXED HYPERLIPIDEMIA: ICD-10-CM

## 2024-12-04 DIAGNOSIS — E87.6 HYPOKALEMIA: ICD-10-CM

## 2024-12-04 DIAGNOSIS — R73.03 PREDIABETES: ICD-10-CM

## 2024-12-04 DIAGNOSIS — R73.09 ELEVATED HEMOGLOBIN A1C: ICD-10-CM

## 2024-12-04 DIAGNOSIS — I10 BENIGN ESSENTIAL HTN: Primary | ICD-10-CM

## 2024-12-06 LAB
ALBUMIN SERPL-MCNC: 4.7 G/DL (ref 3.8–4.8)
ALP SERPL-CCNC: 42 IU/L (ref 44–121)
ALT SERPL-CCNC: 14 IU/L (ref 0–32)
APPEARANCE UR: CLEAR
AST SERPL-CCNC: 22 IU/L (ref 0–40)
BACTERIA #/AREA URNS HPF: ABNORMAL /[HPF]
BASOPHILS # BLD AUTO: 0.1 X10E3/UL (ref 0–0.2)
BASOPHILS NFR BLD AUTO: 1 %
BILIRUB SERPL-MCNC: 0.6 MG/DL (ref 0–1.2)
BILIRUB UR QL STRIP: NEGATIVE
BUN SERPL-MCNC: 13 MG/DL (ref 8–27)
BUN/CREAT SERPL: 21 (ref 12–28)
CALCIUM SERPL-MCNC: 9.9 MG/DL (ref 8.7–10.3)
CASTS URNS QL MICRO: ABNORMAL /LPF
CHLORIDE SERPL-SCNC: 101 MMOL/L (ref 96–106)
CHOLEST SERPL-MCNC: 172 MG/DL (ref 100–199)
CO2 SERPL-SCNC: 27 MMOL/L (ref 20–29)
COLOR UR: YELLOW
CREAT SERPL-MCNC: 0.63 MG/DL (ref 0.57–1)
EGFRCR SERPLBLD CKD-EPI 2021: 94 ML/MIN/1.73
EOSINOPHIL # BLD AUTO: 0.2 X10E3/UL (ref 0–0.4)
EOSINOPHIL NFR BLD AUTO: 3 %
EPI CELLS #/AREA URNS HPF: ABNORMAL /HPF (ref 0–10)
ERYTHROCYTE [DISTWIDTH] IN BLOOD BY AUTOMATED COUNT: 12.1 % (ref 11.7–15.4)
GLOBULIN SER CALC-MCNC: 2.7 G/DL (ref 1.5–4.5)
GLUCOSE SERPL-MCNC: 92 MG/DL (ref 70–99)
GLUCOSE UR QL STRIP: NEGATIVE
HBA1C MFR BLD: 5.7 % (ref 4.8–5.6)
HCT VFR BLD AUTO: 40.7 % (ref 34–46.6)
HDLC SERPL-MCNC: 75 MG/DL
HGB BLD-MCNC: 13.6 G/DL (ref 11.1–15.9)
HGB UR QL STRIP: NEGATIVE
IMM GRANULOCYTES # BLD AUTO: 0 X10E3/UL (ref 0–0.1)
IMM GRANULOCYTES NFR BLD AUTO: 0 %
KETONES UR QL STRIP: ABNORMAL
LDLC SERPL CALC-MCNC: 77 MG/DL (ref 0–99)
LDLC/HDLC SERPL: 1 RATIO (ref 0–3.2)
LEUKOCYTE ESTERASE UR QL STRIP: ABNORMAL
LYMPHOCYTES # BLD AUTO: 2.5 X10E3/UL (ref 0.7–3.1)
LYMPHOCYTES NFR BLD AUTO: 32 %
MCH RBC QN AUTO: 30.8 PG (ref 26.6–33)
MCHC RBC AUTO-ENTMCNC: 33.4 G/DL (ref 31.5–35.7)
MCV RBC AUTO: 92 FL (ref 79–97)
MICRO URNS: ABNORMAL
MONOCYTES # BLD AUTO: 0.7 X10E3/UL (ref 0.1–0.9)
MONOCYTES NFR BLD AUTO: 9 %
NEUTROPHILS # BLD AUTO: 4.3 X10E3/UL (ref 1.4–7)
NEUTROPHILS NFR BLD AUTO: 55 %
NITRITE UR QL STRIP: NEGATIVE
PH UR STRIP: 8 [PH] (ref 5–7.5)
PLATELET # BLD AUTO: 381 X10E3/UL (ref 150–450)
POTASSIUM SERPL-SCNC: 4 MMOL/L (ref 3.5–5.2)
PROT SERPL-MCNC: 7.4 G/DL (ref 6–8.5)
PROT UR QL STRIP: ABNORMAL
RBC # BLD AUTO: 4.41 X10E6/UL (ref 3.77–5.28)
RBC #/AREA URNS HPF: ABNORMAL /HPF (ref 0–2)
SODIUM SERPL-SCNC: 143 MMOL/L (ref 134–144)
SP GR UR STRIP: 1.02 (ref 1–1.03)
TRIGL SERPL-MCNC: 115 MG/DL (ref 0–149)
UROBILINOGEN UR STRIP-MCNC: 1 MG/DL (ref 0.2–1)
VLDLC SERPL CALC-MCNC: 20 MG/DL (ref 5–40)
WBC # BLD AUTO: 7.8 X10E3/UL (ref 3.4–10.8)
WBC #/AREA URNS HPF: ABNORMAL /HPF (ref 0–5)

## 2024-12-13 ENCOUNTER — OFFICE VISIT (OUTPATIENT)
Dept: FAMILY MEDICINE CLINIC | Facility: CLINIC | Age: 74
End: 2024-12-13
Payer: MEDICARE

## 2024-12-13 VITALS
RESPIRATION RATE: 17 BRPM | HEART RATE: 83 BPM | HEIGHT: 65 IN | DIASTOLIC BLOOD PRESSURE: 70 MMHG | TEMPERATURE: 98.2 F | OXYGEN SATURATION: 96 % | WEIGHT: 137.9 LBS | SYSTOLIC BLOOD PRESSURE: 132 MMHG | BODY MASS INDEX: 22.98 KG/M2

## 2024-12-13 DIAGNOSIS — Z00.00 MEDICARE ANNUAL WELLNESS VISIT, SUBSEQUENT: ICD-10-CM

## 2024-12-13 DIAGNOSIS — R73.03 PREDIABETES: ICD-10-CM

## 2024-12-13 DIAGNOSIS — K22.70 BARRETT'S ESOPHAGUS WITHOUT DYSPLASIA: Primary | ICD-10-CM

## 2024-12-13 DIAGNOSIS — I10 BENIGN ESSENTIAL HTN: ICD-10-CM

## 2024-12-13 DIAGNOSIS — E78.2 MIXED HYPERLIPIDEMIA: ICD-10-CM

## 2024-12-13 DIAGNOSIS — F41.1 ANXIETY, GENERALIZED: ICD-10-CM

## 2024-12-13 PROCEDURE — 1126F AMNT PAIN NOTED NONE PRSNT: CPT | Performed by: NURSE PRACTITIONER

## 2024-12-13 PROCEDURE — 3075F SYST BP GE 130 - 139MM HG: CPT | Performed by: NURSE PRACTITIONER

## 2024-12-13 PROCEDURE — 3078F DIAST BP <80 MM HG: CPT | Performed by: NURSE PRACTITIONER

## 2024-12-13 PROCEDURE — G0439 PPPS, SUBSEQ VISIT: HCPCS | Performed by: NURSE PRACTITIONER

## 2024-12-13 RX ORDER — HYDROCHLOROTHIAZIDE 25 MG/1
25 TABLET ORAL DAILY
Qty: 90 TABLET | Refills: 3 | Status: SHIPPED | OUTPATIENT
Start: 2024-12-13

## 2024-12-13 RX ORDER — METFORMIN HYDROCHLORIDE 500 MG/1
500 TABLET, EXTENDED RELEASE ORAL
Qty: 90 TABLET | Refills: 3 | Status: SHIPPED | OUTPATIENT
Start: 2024-12-13

## 2024-12-13 RX ORDER — NIFEDIPINE 30 MG/1
30 TABLET, EXTENDED RELEASE ORAL DAILY
Qty: 90 TABLET | Refills: 3 | Status: SHIPPED | OUTPATIENT
Start: 2024-12-13

## 2024-12-13 RX ORDER — OMEPRAZOLE 40 MG/1
40 CAPSULE, DELAYED RELEASE ORAL
Qty: 90 CAPSULE | Refills: 3 | Status: SHIPPED | OUTPATIENT
Start: 2024-12-13

## 2024-12-13 RX ORDER — ROSUVASTATIN CALCIUM 20 MG/1
20 TABLET, COATED ORAL DAILY
Qty: 90 TABLET | Refills: 3 | Status: SHIPPED | OUTPATIENT
Start: 2024-12-13

## 2024-12-13 RX ORDER — VENLAFAXINE HYDROCHLORIDE 150 MG/1
150 CAPSULE, EXTENDED RELEASE ORAL NIGHTLY
Qty: 90 CAPSULE | Refills: 3 | Status: SHIPPED | OUTPATIENT
Start: 2024-12-13

## 2024-12-13 NOTE — ASSESSMENT & PLAN NOTE
Borderline stable, continue current treatment of HCTZ and nifedipine.    Orders:    hydroCHLOROthiazide 25 MG tablet; Take 1 tablet by mouth Daily.    NIFEdipine XL (PROCARDIA XL) 30 MG 24 hr tablet; Take 1 tablet by mouth Daily. (Patient taking differently: Take 1 tablet by mouth Every Night.)

## 2024-12-13 NOTE — ASSESSMENT & PLAN NOTE
2/16/22 colonoscopy by Dr. White, diverticula, polyps, inter hemm; has an appt with Dr Everett 12/18/24    Orders:    omeprazole (priLOSEC) 40 MG capsule; Take 1 capsule by mouth Every Morning Before Breakfast.

## 2024-12-13 NOTE — ASSESSMENT & PLAN NOTE
Stable, no changes to current treatment.    Orders:    venlafaxine XR (EFFEXOR-XR) 150 MG 24 hr capsule; Take 1 capsule by mouth Every Night.

## 2024-12-13 NOTE — ASSESSMENT & PLAN NOTE
Stable, continue metformin as current.  Orders:    metFORMIN ER (GLUCOPHAGE-XR) 500 MG 24 hr tablet; Take 1 tablet by mouth Daily With Breakfast.

## 2024-12-13 NOTE — PROGRESS NOTES
Subjective   The ABCs of the Annual Wellness Visit  Medicare Wellness Visit      Teagan Cates is a 74 y.o. patient who presents for a Medicare Wellness Visit.    The following portions of the patient's history were reviewed and   updated as appropriate: allergies, current medications, past family history, past medical history, past social history, past surgical history, and problem list.    Compared to one year ago, the patient's physical   health is the same.  Compared to one year ago, the patient's mental   health is the same.    Recent Hospitalizations:  She was not admitted to the hospital during the last year.     Current Medical Providers:  Patient Care Team:  Mary Urbano APRN as PCP - General (Family Medicine)    Outpatient Medications Prior to Visit   Medication Sig Dispense Refill    hydroCHLOROthiazide 25 MG tablet TAKE 1 TABLET BY MOUTH EVERY DAY 90 tablet 1    metFORMIN ER (GLUCOPHAGE-XR) 500 MG 24 hr tablet TAKE 1 TABLET BY MOUTH EVERY DAY WITH BREAKFAST 90 tablet 1    NIFEdipine XL (PROCARDIA XL) 30 MG 24 hr tablet TAKE 1 TABLET BY MOUTH EVERY DAY 90 tablet 1    omeprazole (priLOSEC) 40 MG capsule TAKE 1 CAPSULE BY MOUTH EVERY DAY IN THE MORNING BEFORE BREAKFAST 90 capsule 1    rosuvastatin (CRESTOR) 20 MG tablet TAKE 1 TABLET BY MOUTH EVERY DAY 90 tablet 1    venlafaxine XR (EFFEXOR-XR) 150 MG 24 hr capsule Take 1 capsule by mouth Every Night. 90 capsule 0    Azelastine HCl 137 MCG/SPRAY solution 1 spray by Each Nare route 2 (Two) Times a Day. 30 mL 0     No facility-administered medications prior to visit.     No opioid medication identified on active medication list. I have reviewed chart for other potential  high risk medication/s and harmful drug interactions in the elderly.      Aspirin is not on active medication list.  Aspirin use is not indicated based on review of current medical condition/s. Risk of harm outweighs potential benefits.  .    Patient Active Problem List   Diagnosis  "   Benign essential HTN    HLD (hyperlipidemia)    Acid reflux    Anxiety, generalized    Heart murmur, systolic    HH (hiatus hernia)    Gastroesophageal reflux disease without esophagitis    Hiatal hernia    Pedro's esophagus without dysplasia    Tenosynovitis, de Quervain    Elevated hemoglobin A1c    Change in bowel habits    Allergic reaction    Prediabetes    Acute non intractable tension-type headache     Advance Care Planning Advance Directive is not on file.  ACP discussion was held with the patient during this visit. Patient does not have an advance directive, declines further assistance.            Objective   Vitals:    12/13/24 1333 12/13/24 1347   BP: 136/64 132/70   BP Location: Right arm    Patient Position: Sitting    Cuff Size: Adult    Pulse: 83    Resp: 17    Temp: 98.2 °F (36.8 °C)    TempSrc: Temporal    SpO2: 96%    Weight: 62.6 kg (137 lb 14.4 oz)    Height: 165.1 cm (65\")    PainSc: 0-No pain        Estimated body mass index is 22.95 kg/m² as calculated from the following:    Height as of this encounter: 165.1 cm (65\").    Weight as of this encounter: 62.6 kg (137 lb 14.4 oz).    BMI is within normal parameters. No other follow-up for BMI required.       Does the patient have evidence of cognitive impairment? No    Recent Results (from the past 2 weeks)   CBC & Differential    Collection Time: 12/05/24  1:00 PM    Specimen: Blood   Result Value Ref Range    WBC 7.8 3.4 - 10.8 x10E3/uL    RBC 4.41 3.77 - 5.28 x10E6/uL    Hemoglobin 13.6 11.1 - 15.9 g/dL    Hematocrit 40.7 34.0 - 46.6 %    MCV 92 79 - 97 fL    MCH 30.8 26.6 - 33.0 pg    MCHC 33.4 31.5 - 35.7 g/dL    RDW 12.1 11.7 - 15.4 %    Platelets 381 150 - 450 x10E3/uL    Neutrophil Rel % 55 Not Estab. %    Lymphocyte Rel % 32 Not Estab. %    Monocyte Rel % 9 Not Estab. %    Eosinophil Rel % 3 Not Estab. %    Basophil Rel % 1 Not Estab. %    Neutrophils Absolute 4.3 1.4 - 7.0 x10E3/uL    Lymphocytes Absolute 2.5 0.7 - 3.1 x10E3/uL    " Monocytes Absolute 0.7 0.1 - 0.9 x10E3/uL    Eosinophils Absolute 0.2 0.0 - 0.4 x10E3/uL    Basophils Absolute 0.1 0.0 - 0.2 x10E3/uL    Immature Granulocyte Rel % 0 Not Estab. %    Immature Grans Absolute 0.0 0.0 - 0.1 x10E3/uL   Comprehensive Metabolic Panel    Collection Time: 12/05/24  1:00 PM    Specimen: Blood   Result Value Ref Range    Glucose 92 70 - 99 mg/dL    BUN 13 8 - 27 mg/dL    Creatinine 0.63 0.57 - 1.00 mg/dL    EGFR Result 94 >59 mL/min/1.73    BUN/Creatinine Ratio 21 12 - 28    Sodium 143 134 - 144 mmol/L    Potassium 4.0 3.5 - 5.2 mmol/L    Chloride 101 96 - 106 mmol/L    Total CO2 27 20 - 29 mmol/L    Calcium 9.9 8.7 - 10.3 mg/dL    Total Protein 7.4 6.0 - 8.5 g/dL    Albumin 4.7 3.8 - 4.8 g/dL    Globulin 2.7 1.5 - 4.5 g/dL    Total Bilirubin 0.6 0.0 - 1.2 mg/dL    Alkaline Phosphatase 42 (L) 44 - 121 IU/L    AST (SGOT) 22 0 - 40 IU/L    ALT (SGPT) 14 0 - 32 IU/L   Lipid Panel With LDL / HDL Ratio    Collection Time: 12/05/24  1:00 PM    Specimen: Blood   Result Value Ref Range    Total Cholesterol 172 100 - 199 mg/dL    Triglycerides 115 0 - 149 mg/dL    HDL Cholesterol 75 >39 mg/dL    VLDL Cholesterol Joni 20 5 - 40 mg/dL    LDL Chol Calc (NIH) 77 0 - 99 mg/dL    LDL/HDL RATIO 1.0 0.0 - 3.2 ratio   Urinalysis With Microscopic If Indicated (No Culture) - Urine, Clean Catch    Collection Time: 12/05/24  1:00 PM    Specimen: Urine, Clean Catch   Result Value Ref Range    Specific Gravity, UA 1.021 1.005 - 1.030    pH, UA 8.0 (H) 5.0 - 7.5    Color, UA Yellow Yellow    Appearance, UA Clear Clear    Leukocytes, UA 1+ (A) Negative    Protein 1+ (A) Negative/Trace    Glucose, UA Negative Negative    Ketones Trace (A) Negative    Blood, UA Negative Negative    Bilirubin, UA Negative Negative    Urobilinogen, UA 1.0 0.2 - 1.0 mg/dL    Nitrite, UA Negative Negative    Microscopic Examination See below:    Hemoglobin A1c    Collection Time: 12/05/24  1:00 PM    Specimen: Blood   Result Value Ref Range     Hemoglobin A1C 5.7 (H) 4.8 - 5.6 %   Microscopic Examination -    Collection Time: 24  1:00 PM   Result Value Ref Range    WBC, UA 11-30 (A) 0 - 5 /hpf    RBC, UA None seen 0 - 2 /hpf    Epithelial Cells (non renal) 0-10 0 - 10 /hpf    Casts None seen None seen /lpf    Bacteria, UA None seen None seen/Few   POC Glucose Once    Collection Time: 24 11:54 AM    Specimen: Blood   Result Value Ref Range    Glucose 95 70 - 130 mg/dL                                                                                                   Health  Risk Assessment    Smoking Status:  Social History     Tobacco Use   Smoking Status Never    Passive exposure: Never   Smokeless Tobacco Never     Alcohol Consumption:  Social History     Substance and Sexual Activity   Alcohol Use Yes    Alcohol/week: 20.0 standard drinks of alcohol    Types: 20 Glasses of wine per week    Comment: 2 DRINKS PER DAY       Fall Risk Screen  STEADI Fall Risk Assessment was completed, and patient is at LOW risk for falls.Assessment completed on:2024    Depression Screening   Little interest or pleasure in doing things? Not at all   Feeling down, depressed, or hopeless? Not at all   PHQ-2 Total Score 0      Health Habits and Functional and Cognitive Screenin/13/2024     1:31 PM   Functional & Cognitive Status   Do you have difficulty preparing food and eating? No   Do you have difficulty bathing yourself, getting dressed or grooming yourself? No   Do you have difficulty using the toilet? No   Do you have difficulty moving around from place to place? No   Do you have trouble with steps or getting out of a bed or a chair? No   Current Diet Low Carb Diet   Dental Exam Up to date   Eye Exam Up to date   Exercise (times per week) 3 times per week   Current Exercises Include House Cleaning;Walking   Do you need help using the phone?  No   Are you deaf or do you have serious difficulty hearing?  No   Do you need help to go to places out  of walking distance? No   Do you need help shopping? No   Do you need help preparing meals?  No   Do you need help with housework?  No   Do you need help with laundry? No   Do you need help taking your medications? No   Do you need help managing money? No   Do you ever drive or ride in a car without wearing a seat belt? No   Have you felt unusual stress, anger or loneliness in the last month? No   Who do you live with? Spouse   If you need help, do you have trouble finding someone available to you? No   Have you been bothered in the last four weeks by sexual problems? No   Do you have difficulty concentrating, remembering or making decisions? No           Age-appropriate Screening Schedule:  Refer to the list below for future screening recommendations based on patient's age, sex and/or medical conditions. Orders for these recommended tests are listed in the plan section. The patient has been provided with a written plan.    Health Maintenance List  Health Maintenance   Topic Date Due    COVID-19 Vaccine (4 - 2024-25 season) 09/01/2024    DXA SCAN  10/26/2025    LIPID PANEL  12/05/2025    ANNUAL WELLNESS VISIT  12/13/2025    MAMMOGRAM  01/26/2026    GASTROSCOPY (EGD)  12/18/2027    COLORECTAL CANCER SCREENING  12/16/2029    TDAP/TD VACCINES (3 - Td or Tdap) 09/22/2031    HEPATITIS C SCREENING  Completed    INFLUENZA VACCINE  Completed    Pneumococcal Vaccine 65+  Completed    ZOSTER VACCINE  Completed    PAP SMEAR  Discontinued                                                                                                                                                CMS Preventative Services Quick Reference  Risk Factors Identified During Encounter  Immunizations Discussed/Encouraged: COVID19  Dental Screening Recommended  Vision Screening Recommended    The above risks/problems have been discussed with the patient.  Pertinent information has been shared with the patient in the After Visit Summary.  An After Visit  "Summary and PPPS were made available to the patient.    Follow Up:   Next Medicare Wellness visit to be scheduled in 1 year.         Additional E&M Note during same encounter follows:  Patient has additional, significant, and separately identifiable condition(s)/problem(s) that require work above and beyond the Medicare Wellness Visit     Chief Complaint  Medicare Wellness-subsequent and Abnormal Lab (CMP, HgA1c, Urinalysis. 12/05)    Subjective   Abnormal Lab  Symptoms include headaches (right temporal headaches).      Teagan Cates is also being seen today for additional medical problem/s.    Review of Systems   Constitutional: Negative.    Respiratory: Negative.     Cardiovascular: Negative.               Objective   Vital Signs:  /70   Pulse 83   Temp 98.2 °F (36.8 °C) (Temporal)   Resp 17   Ht 165.1 cm (65\")   Wt 62.6 kg (137 lb 14.4 oz)   SpO2 96%   BMI 22.95 kg/m²   Physical Exam  Vitals reviewed.   HENT:      Head: Normocephalic.   Eyes:      Pupils: Pupils are equal, round, and reactive to light.   Neck:      Vascular: No carotid bruit.   Cardiovascular:      Rate and Rhythm: Normal rate and regular rhythm.      Pulses: Normal pulses.   Pulmonary:      Effort: Pulmonary effort is normal.      Breath sounds: Normal breath sounds.   Musculoskeletal:         General: Normal range of motion.      Right lower leg: No edema.      Left lower leg: No edema.   Skin:     General: Skin is warm and dry.   Neurological:      Mental Status: She is alert and oriented to person, place, and time.   Psychiatric:         Behavior: Behavior normal.                       Assessment and Plan            Medicare annual wellness visit, subsequent         Benign essential HTN  Borderline stable, continue current treatment of HCTZ and nifedipine.    Orders:    hydroCHLOROthiazide 25 MG tablet; Take 1 tablet by mouth Daily.    NIFEdipine XL (PROCARDIA XL) 30 MG 24 hr tablet; Take 1 tablet by mouth Daily. (Patient taking " differently: Take 1 tablet by mouth Every Night.)    Mixed hyperlipidemia   Stable, continue rosuvastatin as prescribed. Continue to eat a heart healthy diet, drink plenty of water throughout day and stay active 3-5 times a week.       Orders:    rosuvastatin (CRESTOR) 20 MG tablet; Take 1 tablet by mouth Daily.    Prediabetes  Stable, continue metformin as current.  Orders:    metFORMIN ER (GLUCOPHAGE-XR) 500 MG 24 hr tablet; Take 1 tablet by mouth Daily With Breakfast.    Anxiety, generalized  Stable, no changes to current treatment.    Orders:    venlafaxine XR (EFFEXOR-XR) 150 MG 24 hr capsule; Take 1 capsule by mouth Every Night.    Pedro's esophagus without dysplasia  2/16/22 colonoscopy by Dr. White, diverticula, polyps, inter hemm; has an appt with Dr Everett 12/18/24    Orders:    omeprazole (priLOSEC) 40 MG capsule; Take 1 capsule by mouth Every Morning Before Breakfast.            Follow Up   Return for fasting labs in 6 months, recheck in 6 months.  Patient was given instructions and counseling regarding her condition or for health maintenance advice. Please see specific information pulled into the AVS if appropriate.

## 2024-12-13 NOTE — ASSESSMENT & PLAN NOTE
Stable, continue rosuvastatin as prescribed. Continue to eat a heart healthy diet, drink plenty of water throughout day and stay active 3-5 times a week.       Orders:    rosuvastatin (CRESTOR) 20 MG tablet; Take 1 tablet by mouth Daily.

## 2024-12-18 ENCOUNTER — ANESTHESIA (OUTPATIENT)
Dept: GASTROENTEROLOGY | Facility: HOSPITAL | Age: 74
End: 2024-12-18
Payer: MEDICARE

## 2024-12-18 ENCOUNTER — ANESTHESIA EVENT (OUTPATIENT)
Dept: GASTROENTEROLOGY | Facility: HOSPITAL | Age: 74
End: 2024-12-18
Payer: MEDICARE

## 2024-12-18 ENCOUNTER — HOSPITAL ENCOUNTER (OUTPATIENT)
Facility: HOSPITAL | Age: 74
Setting detail: HOSPITAL OUTPATIENT SURGERY
Discharge: HOME | End: 2024-12-18
Attending: INTERNAL MEDICINE | Admitting: INTERNAL MEDICINE
Payer: MEDICARE

## 2024-12-18 VITALS
SYSTOLIC BLOOD PRESSURE: 144 MMHG | HEIGHT: 65 IN | RESPIRATION RATE: 16 BRPM | WEIGHT: 136 LBS | HEART RATE: 83 BPM | BODY MASS INDEX: 22.66 KG/M2 | DIASTOLIC BLOOD PRESSURE: 74 MMHG | OXYGEN SATURATION: 96 %

## 2024-12-18 DIAGNOSIS — K22.70 BARRETT'S ESOPHAGUS WITHOUT DYSPLASIA: ICD-10-CM

## 2024-12-18 LAB — GLUCOSE BLDC GLUCOMTR-MCNC: 95 MG/DL (ref 70–130)

## 2024-12-18 PROCEDURE — 88305 TISSUE EXAM BY PATHOLOGIST: CPT | Performed by: INTERNAL MEDICINE

## 2024-12-18 PROCEDURE — 25010000002 PROPOFOL 10 MG/ML EMULSION: Performed by: NURSE ANESTHETIST, CERTIFIED REGISTERED

## 2024-12-18 PROCEDURE — 82948 REAGENT STRIP/BLOOD GLUCOSE: CPT

## 2024-12-18 PROCEDURE — 25810000003 LACTATED RINGERS PER 1000 ML: Performed by: INTERNAL MEDICINE

## 2024-12-18 PROCEDURE — 43239 EGD BIOPSY SINGLE/MULTIPLE: CPT | Performed by: INTERNAL MEDICINE

## 2024-12-18 PROCEDURE — 25010000002 LIDOCAINE 2% SOLUTION: Performed by: NURSE ANESTHETIST, CERTIFIED REGISTERED

## 2024-12-18 PROCEDURE — 25010000002 GLYCOPYRROLATE 0.2 MG/ML SOLUTION: Performed by: NURSE ANESTHETIST, CERTIFIED REGISTERED

## 2024-12-18 RX ORDER — SODIUM CHLORIDE, SODIUM LACTATE, POTASSIUM CHLORIDE, CALCIUM CHLORIDE 600; 310; 30; 20 MG/100ML; MG/100ML; MG/100ML; MG/100ML
30 INJECTION, SOLUTION INTRAVENOUS CONTINUOUS PRN
Status: DISCONTINUED | OUTPATIENT
Start: 2024-12-18 | End: 2024-12-18 | Stop reason: HOSPADM

## 2024-12-18 RX ORDER — LIDOCAINE HYDROCHLORIDE 20 MG/ML
INJECTION, SOLUTION INFILTRATION; PERINEURAL AS NEEDED
Status: DISCONTINUED | OUTPATIENT
Start: 2024-12-18 | End: 2024-12-18 | Stop reason: SURG

## 2024-12-18 RX ORDER — PROPOFOL 10 MG/ML
VIAL (ML) INTRAVENOUS CONTINUOUS PRN
Status: DISCONTINUED | OUTPATIENT
Start: 2024-12-18 | End: 2024-12-18 | Stop reason: SURG

## 2024-12-18 RX ORDER — GLYCOPYRROLATE 0.2 MG/ML
INJECTION INTRAMUSCULAR; INTRAVENOUS AS NEEDED
Status: DISCONTINUED | OUTPATIENT
Start: 2024-12-18 | End: 2024-12-18 | Stop reason: SURG

## 2024-12-18 RX ADMIN — SODIUM CHLORIDE, POTASSIUM CHLORIDE, SODIUM LACTATE AND CALCIUM CHLORIDE 30 ML/HR: 600; 310; 30; 20 INJECTION, SOLUTION INTRAVENOUS at 12:01

## 2024-12-18 RX ADMIN — GLYCOPYRROLATE 0.2 MG: 0.2 INJECTION INTRAMUSCULAR; INTRAVENOUS at 12:53

## 2024-12-18 RX ADMIN — LIDOCAINE HYDROCHLORIDE 60 MG: 20 INJECTION, SOLUTION INFILTRATION; PERINEURAL at 12:53

## 2024-12-18 RX ADMIN — PROPOFOL 180 MCG/KG/MIN: 10 INJECTION, EMULSION INTRAVENOUS at 12:53

## 2024-12-18 NOTE — ANESTHESIA POSTPROCEDURE EVALUATION
"Patient: Teagan Cates    Procedure Summary       Date: 12/18/24 Room / Location:  [facility] /  [facility]    Anesthesia Start: 1250 Anesthesia Stop: 1303    Procedure: ESOPHAGOGASTRODUODENOSCOPY WITH BIOPSY (Esophagus) Diagnosis:       Pedro's esophagus without dysplasia      (Pedro's esophagus without dysplasia [K22.70])    Surgeons: Dwayne Gao MD Provider: Nishant Holman MD    Anesthesia Type: MAC ASA Status: 2            Anesthesia Type: MAC    Vitals  Vitals Value Taken Time   /74 12/18/24 1322   Temp     Pulse 81 12/18/24 1326   Resp 16 12/18/24 1321   SpO2 96 % 12/18/24 1326   Vitals shown include unfiled device data.        Post Anesthesia Care and Evaluation    Patient location during evaluation: bedside  Patient participation: complete - patient participated  Level of consciousness: awake and alert  Pain management: adequate    Airway patency: patent  Anesthetic complications: No anesthetic complications    Cardiovascular status: acceptable  Respiratory status: acceptable  Hydration status: acceptable    Comments: /74 (BP Location: Left arm, Patient Position: Sitting)   Pulse 83   Resp 16   Ht 165.1 cm (65\")   Wt 61.7 kg (136 lb)   SpO2 96%   BMI 22.63 kg/m²     "

## 2024-12-18 NOTE — DISCHARGE INSTRUCTIONS
For the next 24 hours patient needs to be with a responsible adult.    For 24 hours DO NOT drive, operate machinery, appliances, drink alcohol, make important decisions or sign legal documents.    Start with a light or bland diet if you are feeling sick to your stomach otherwise advance to regular diet as tolerated.    Follow recommendations on procedure report if provided by your doctor.    Call Dr Gao for problems [phone]    Problems may include but not limited to: large amounts of bleeding, trouble breathing, repeated vomiting, severe unrelieved pain, fever or chills.

## 2024-12-18 NOTE — H&P
[facility]  Pre Procedure History & Physical    Chief Complaint:   Barretts    Subjective     HPI:   74 y.o. female here for EGD for hx of Barretts    Past Medical History:   Past Medical History:   Diagnosis Date    Allergic 1980    Sulfa    Anxiety     Pedro's esophagus without dysplasia 04/08/2021    Diabetes mellitus     GERD (gastroesophageal reflux disease)     Heart murmur ?    Hernia     Hyperlipidemia     Hypertension        Past Surgical History:  Past Surgical History:   Procedure Laterality Date    ABDOMINOPLASTY      BREAST SURGERY Bilateral     breast reduction    CATARACT EXTRACTION W/ INTRAOCULAR LENS IMPLANT Bilateral     CHOLECYSTECTOMY      COLONOSCOPY  02/2016    normal per pt     COLONOSCOPY N/A 12/16/2022    Procedure: COLONOSCOPY TO CECUM/TI WITH COLD SNARE POLYPECTOMIES AND RANDOM COLON BIOPSIES;  Surgeon: Julianne White MD;  Location:  [facility];  Service: Gastroenterology;  Laterality: N/A;  pre: LOWER ABDOMINAL PAIN, CHANGE IN BOWEL  post: POLYPS, HEMORRHOIDS    COSMETIC SURGERY  1995    ENDOSCOPY N/A 05/02/2017    large HH, gastritis, multiple gastric polyps, nodule in duodenum    ENDOSCOPY N/A 03/15/2021    Procedure: ESOPHAGOGASTRODUODENOSCOPY WITH COLD BX'S;  Surgeon: Julianne White MD;  Location:  [facility];  Service: Gastroenterology;  Laterality: N/A;  pre: hx hiatal hernia, GERD  post: hiatal hernia, gastritis, gastric plyps, lymphangiectasia.     EYE SURGERY  2015    CATARACTS    HYSTERECTOMY      MOHS SURGERY  2017    TUBAL ABDOMINAL LIGATION      UPPER GASTROINTESTINAL ENDOSCOPY      2021       Family History:  Family History   Problem Relation Age of Onset    Hyperlipidemia Mother     Stroke Mother     Heart disease Father     Stroke Sister     Inflammatory bowel disease Brother     Malig Hyperthermia Neg Hx        Social History:   reports that she has never smoked. She has never been exposed to tobacco smoke. She has never used  "smokeless tobacco. She reports current alcohol use of about 20.0 standard drinks of alcohol per week. She reports that she does not use drugs.    Medications:   Medications Prior to Admission   Medication Sig Dispense Refill Last Dose/Taking    hydroCHLOROthiazide 25 MG tablet Take 1 tablet by mouth Daily. 90 tablet 3 12/17/2024    metFORMIN ER (GLUCOPHAGE-XR) 500 MG 24 hr tablet Take 1 tablet by mouth Daily With Breakfast. 90 tablet 3 12/17/2024    NIFEdipine XL (PROCARDIA XL) 30 MG 24 hr tablet Take 1 tablet by mouth Daily. (Patient taking differently: Take 1 tablet by mouth Every Night.) 90 tablet 3 12/17/2024    omeprazole (priLOSEC) 40 MG capsule Take 1 capsule by mouth Every Morning Before Breakfast. 90 capsule 3 12/17/2024    rosuvastatin (CRESTOR) 20 MG tablet Take 1 tablet by mouth Daily. 90 tablet 3 12/17/2024    venlafaxine XR (EFFEXOR-XR) 150 MG 24 hr capsule Take 1 capsule by mouth Every Night. 90 capsule 3 12/17/2024       Allergies:  Sulfa antibiotics, Atacand [candesartan], and Iodinated contrast media    ROS:    Pertinent items are noted in HPI     Objective     Blood pressure 148/87, pulse 80, resp. rate 16, height 165.1 cm (65\"), weight 61.7 kg (136 lb), SpO2 98%, not currently breastfeeding.    Physical Exam   Constitutional: Pt is oriented to person, place, and time and well-developed, well-nourished, and in no distress.   Mouth/Throat: Oropharynx is clear and moist.   Neck: Normal range of motion.   Cardiovascular: Normal rate, regular rhythm and normal heart sounds.    Pulmonary/Chest: Effort normal and breath sounds normal.   Abdominal: Soft. Nontender  Skin: Skin is warm and dry.   Psychiatric: Mood, memory, affect and judgment normal.     Assessment & Plan     Diagnosis:  Barretts    Anticipated Surgical Procedure:  EGD    The risks, benefits, and alternatives of this procedure have been discussed with the patient or the responsible party- the patient understands and agrees to " proceed.

## 2024-12-18 NOTE — ANESTHESIA PREPROCEDURE EVALUATION
Anesthesia Evaluation     Patient summary reviewed and Nursing notes reviewed                Airway   Mallampati: II  Dental      Pulmonary - negative pulmonary ROS   Cardiovascular     Rhythm: regular  Rate: normal    (+) hypertension, valvular problems/murmurs (mild) murmur and AI, hyperlipidemia      Neuro/Psych  (+) headaches, psychiatric history Anxiety and Depression  GI/Hepatic/Renal/Endo    (+) hiatal hernia, GERD, diabetes mellitus type 2    Musculoskeletal (-) negative ROS    Abdominal    Substance History   (+) alcohol use     OB/GYN negative ob/gyn ROS         Other      history of cancer (skin)                Anesthesia Plan    ASA 2     MAC     intravenous induction     Anesthetic plan, risks, benefits, and alternatives have been provided, discussed and informed consent has been obtained with: patient.    CODE STATUS:

## 2024-12-19 LAB
CYTO UR: NORMAL
LAB AP CASE REPORT: NORMAL
PATH REPORT.FINAL DX SPEC: NORMAL
PATH REPORT.GROSS SPEC: NORMAL

## 2025-01-03 ENCOUNTER — TELEPHONE (OUTPATIENT)
Dept: GASTROENTEROLOGY | Facility: CLINIC | Age: 75
End: 2025-01-03
Payer: MEDICARE

## 2025-01-03 NOTE — TELEPHONE ENCOUNTER
Pt reviewed results via [employer].     Sent pt [employer] msg advising of results and recommendations. Advised to call if any questions.     EGD placed in  and recall for 12/18/28.

## 2025-01-03 NOTE — TELEPHONE ENCOUNTER
----- Message from Dwayne Gao sent at 1/1/2025  2:04 PM EST -----  Benign, no e/o Barretts  Continue daily PPI  Recall EGD 4 years

## 2025-03-05 ENCOUNTER — APPOINTMENT (OUTPATIENT)
Dept: CT IMAGING | Facility: HOSPITAL | Age: 75
End: 2025-03-05
Payer: MEDICARE

## 2025-03-05 ENCOUNTER — APPOINTMENT (OUTPATIENT)
Dept: GENERAL RADIOLOGY | Facility: HOSPITAL | Age: 75
End: 2025-03-05
Payer: MEDICARE

## 2025-03-05 ENCOUNTER — HOSPITAL ENCOUNTER (EMERGENCY)
Facility: HOSPITAL | Age: 75
Discharge: HOME | End: 2025-03-05
Attending: EMERGENCY MEDICINE
Payer: MEDICARE

## 2025-03-05 VITALS
HEART RATE: 88 BPM | DIASTOLIC BLOOD PRESSURE: 88 MMHG | RESPIRATION RATE: 18 BRPM | SYSTOLIC BLOOD PRESSURE: 156 MMHG | OXYGEN SATURATION: 100 % | HEIGHT: 65 IN | WEIGHT: 136.02 LBS | BODY MASS INDEX: 22.66 KG/M2 | TEMPERATURE: 96.7 F

## 2025-03-05 DIAGNOSIS — S62.637B OPEN DISPLACED FRACTURE OF DISTAL PHALANX OF LEFT LITTLE FINGER, INITIAL ENCOUNTER: ICD-10-CM

## 2025-03-05 DIAGNOSIS — T07.XXXA MULTIPLE CONTUSIONS: ICD-10-CM

## 2025-03-05 DIAGNOSIS — S09.93XA FACIAL TRAUMA, INITIAL ENCOUNTER: Primary | ICD-10-CM

## 2025-03-05 LAB
ANION GAP SERPL CALCULATED.3IONS-SCNC: 10 MMOL/L (ref 5–15)
BASOPHILS # BLD AUTO: 0.05 10*3/MM3 (ref 0–0.2)
BASOPHILS NFR BLD AUTO: 0.6 % (ref 0–1.5)
BUN SERPL-MCNC: 16 MG/DL (ref 8–23)
BUN/CREAT SERPL: 28.6 (ref 7–25)
CALCIUM SPEC-SCNC: 9.8 MG/DL (ref 8.6–10.5)
CHLORIDE SERPL-SCNC: 100 MMOL/L (ref 98–107)
CO2 SERPL-SCNC: 29 MMOL/L (ref 22–29)
CREAT SERPL-MCNC: 0.56 MG/DL (ref 0.57–1)
DEPRECATED RDW RBC AUTO: 43.1 FL (ref 37–54)
EGFRCR SERPLBLD CKD-EPI 2021: 96.5 ML/MIN/1.73
EOSINOPHIL # BLD AUTO: 0.27 10*3/MM3 (ref 0–0.4)
EOSINOPHIL NFR BLD AUTO: 3.2 % (ref 0.3–6.2)
ERYTHROCYTE [DISTWIDTH] IN BLOOD BY AUTOMATED COUNT: 12.6 % (ref 12.3–15.4)
GLUCOSE SERPL-MCNC: 99 MG/DL (ref 65–99)
HCT VFR BLD AUTO: 39.2 % (ref 34–46.6)
HGB BLD-MCNC: 12.8 G/DL (ref 12–15.9)
IMM GRANULOCYTES # BLD AUTO: 0.02 10*3/MM3 (ref 0–0.05)
IMM GRANULOCYTES NFR BLD AUTO: 0.2 % (ref 0–0.5)
LYMPHOCYTES # BLD AUTO: 2.5 10*3/MM3 (ref 0.7–3.1)
LYMPHOCYTES NFR BLD AUTO: 29.2 % (ref 19.6–45.3)
MCH RBC QN AUTO: 30.3 PG (ref 26.6–33)
MCHC RBC AUTO-ENTMCNC: 32.7 G/DL (ref 31.5–35.7)
MCV RBC AUTO: 92.7 FL (ref 79–97)
MONOCYTES # BLD AUTO: 0.67 10*3/MM3 (ref 0.1–0.9)
MONOCYTES NFR BLD AUTO: 7.8 % (ref 5–12)
NEUTROPHILS NFR BLD AUTO: 5.06 10*3/MM3 (ref 1.7–7)
NEUTROPHILS NFR BLD AUTO: 59 % (ref 42.7–76)
PLATELET # BLD AUTO: 352 10*3/MM3 (ref 140–450)
PMV BLD AUTO: 9.1 FL (ref 6–12)
POTASSIUM SERPL-SCNC: 4 MMOL/L (ref 3.5–5.2)
RBC # BLD AUTO: 4.23 10*6/MM3 (ref 3.77–5.28)
SODIUM SERPL-SCNC: 139 MMOL/L (ref 136–145)
WBC NRBC COR # BLD AUTO: 8.57 10*3/MM3 (ref 3.4–10.8)

## 2025-03-05 PROCEDURE — 12001 RPR S/N/AX/GEN/TRNK 2.5CM/<: CPT | Performed by: EMERGENCY MEDICINE

## 2025-03-05 PROCEDURE — 96365 THER/PROPH/DIAG IV INF INIT: CPT

## 2025-03-05 PROCEDURE — 25010000002 LIDOCAINE 1% - EPINEPHRINE 1:100000 1 %-1:100000 SOLUTION: Performed by: EMERGENCY MEDICINE

## 2025-03-05 PROCEDURE — 99284 EMERGENCY DEPT VISIT MOD MDM: CPT | Performed by: EMERGENCY MEDICINE

## 2025-03-05 PROCEDURE — 80048 BASIC METABOLIC PNL TOTAL CA: CPT | Performed by: EMERGENCY MEDICINE

## 2025-03-05 PROCEDURE — 70450 CT HEAD/BRAIN W/O DYE: CPT

## 2025-03-05 PROCEDURE — 25010000002 CEFAZOLIN PER 500 MG: Performed by: EMERGENCY MEDICINE

## 2025-03-05 PROCEDURE — 85025 COMPLETE CBC W/AUTO DIFF WBC: CPT | Performed by: EMERGENCY MEDICINE

## 2025-03-05 PROCEDURE — 99284 EMERGENCY DEPT VISIT MOD MDM: CPT

## 2025-03-05 PROCEDURE — 73140 X-RAY EXAM OF FINGER(S): CPT

## 2025-03-05 PROCEDURE — 73110 X-RAY EXAM OF WRIST: CPT

## 2025-03-05 RX ORDER — ACETAMINOPHEN 325 MG/1
650 TABLET ORAL ONCE
Status: COMPLETED | OUTPATIENT
Start: 2025-03-05 | End: 2025-03-05

## 2025-03-05 RX ORDER — CEPHALEXIN 500 MG/1
500 CAPSULE ORAL 3 TIMES DAILY
Qty: 21 CAPSULE | Refills: 0 | Status: SHIPPED | OUTPATIENT
Start: 2025-03-05 | End: 2025-03-12

## 2025-03-05 RX ORDER — LIDOCAINE HYDROCHLORIDE AND EPINEPHRINE 10; 10 MG/ML; UG/ML
10 INJECTION, SOLUTION INFILTRATION; PERINEURAL ONCE
Status: COMPLETED | OUTPATIENT
Start: 2025-03-05 | End: 2025-03-05

## 2025-03-05 RX ADMIN — CEFAZOLIN 1000 MG: 1 INJECTION, POWDER, FOR SOLUTION INTRAMUSCULAR; INTRAVENOUS at 16:13

## 2025-03-05 RX ADMIN — LIDOCAINE HYDROCHLORIDE AND EPINEPHRINE 10 ML: 10; 10 INJECTION, SOLUTION INFILTRATION; PERINEURAL at 16:51

## 2025-03-05 RX ADMIN — ACETAMINOPHEN 650 MG: 325 TABLET ORAL at 15:31

## 2025-03-05 NOTE — DISCHARGE INSTRUCTIONS
Follow up tomorrow or Friday with Dr. Benavidez (hand specialist).  Tylenol as directed for pain.  Take antibiotics as prescribed.   Monitor for signs of infection.  Return to Ed with any headache despite Tylenol, vomiting, confusion, focal weakness or numbness, new complaint or issue.

## 2025-03-05 NOTE — [facility] PROVIDER NOTE
Subjective   History of Present Illness  Patient is a 74-year-old female with history of type 2 diabetes and high blood pressure who is not on anticoagulation who comes to ED with complaint of fall at home striking head and left hand.  Complaining of abrasions and lacerations to left face, lip and hand.  Patient states that she was walking down the driveway which was wet and she slipped falling forward.  She tried to break her fall with her left hand and struck the left side of her face and upper lip.  She did not lose consciousness.  Incident happened about 45 minutes prior to arrival.  Patient reports a mild headache and nausea but no confusion, lethargy, neck pain.  Patient denies any symptoms prior to the fall such as chest pain dizziness or shortness of breath.  Patient's last tetanus was 2021.        Review of Systems   Constitutional: Negative.    HENT:          See HPI   Eyes: Negative.    Respiratory: Negative.     Cardiovascular: Negative.    Gastrointestinal:  Positive for nausea.   Musculoskeletal:  Negative for back pain, gait problem and neck pain.        See HPI   Neurological:  Positive for headaches. Negative for dizziness, speech difficulty, weakness, light-headedness and numbness.       Past Medical History:   Diagnosis Date    Allergic 1980    Sulfa    Anxiety     Pedro's esophagus without dysplasia 04/08/2021    Diabetes mellitus     GERD (gastroesophageal reflux disease)     Heart murmur ?    Hernia     Hyperlipidemia     Hypertension        Allergies   Allergen Reactions    Sulfa Antibiotics Anaphylaxis     anaphylaxis    Atacand [Candesartan] Hives    Iodinated Contrast Media Rash       Past Surgical History:   Procedure Laterality Date    ABDOMINOPLASTY      BREAST SURGERY Bilateral     breast reduction    CATARACT EXTRACTION W/ INTRAOCULAR LENS IMPLANT Bilateral     CHOLECYSTECTOMY      COLONOSCOPY  02/2016    normal per pt     COLONOSCOPY N/A 12/16/2022    Procedure: COLONOSCOPY TO  CECUM/TI WITH COLD SNARE POLYPECTOMIES AND RANDOM COLON BIOPSIES;  Surgeon: Julianne White MD;  Location:  [facility];  Service: Gastroenterology;  Laterality: N/A;  pre: LOWER ABDOMINAL PAIN, CHANGE IN BOWEL  post: POLYPS, HEMORRHOIDS    COSMETIC SURGERY  1995    ENDOSCOPY N/A 05/02/2017    large HH, gastritis, multiple gastric polyps, nodule in duodenum    ENDOSCOPY N/A 03/15/2021    Procedure: ESOPHAGOGASTRODUODENOSCOPY WITH COLD BX'S;  Surgeon: Julianne White MD;  Location:  [facility];  Service: Gastroenterology;  Laterality: N/A;  pre: hx hiatal hernia, GERD  post: hiatal hernia, gastritis, gastric plyps, lymphangiectasia.     ENDOSCOPY N/A 12/18/2024    Procedure: ESOPHAGOGASTRODUODENOSCOPY WITH BIOPSY;  Surgeon: Dwayne Gao MD;  Location:  [facility];  Service: Gastroenterology;  Laterality: N/A;  pre-hx of Pedro's  post-hiatal hernia; irregula z line    EYE SURGERY  2015    CATARACTS    HYSTERECTOMY      MOHS SURGERY  2017    TUBAL ABDOMINAL LIGATION      UPPER GASTROINTESTINAL ENDOSCOPY      2021       Family History   Problem Relation Age of Onset    Hyperlipidemia Mother     Stroke Mother     Heart disease Father     Stroke Sister     Inflammatory bowel disease Brother     Malig Hyperthermia Neg Hx        Social History     Socioeconomic History    Marital status:    Tobacco Use    Smoking status: Never     Passive exposure: Never    Smokeless tobacco: Never   Vaping Use    Vaping status: Never Used   Substance and Sexual Activity    Alcohol use: Yes     Alcohol/week: 20.0 standard drinks of alcohol     Types: 20 Glasses of wine per week     Comment: 2 DRINKS PER DAY    Drug use: No    Sexual activity: Yes     Partners: Male     Birth control/protection: Post-menopausal           Objective   Physical Exam  Vitals and nursing note reviewed.   Constitutional:       General: She is not in acute distress.     Appearance: Normal appearance. She is not ill-appearing or  toxic-appearing.      Comments: Patient is a pleasant and well-appearing 74-year-old who is ambulatory and in no acute distress.   HENT:      Head: Normocephalic. Abrasion and contusion present.        Comments: Abrasion left forehead.  + contusion with mild ttp laft cheek. Swelling left upper lip.     Mouth/Throat:      Mouth: Mucous membranes are moist. Injury present.      Dentition: Normal dentition. No dental tenderness or gum lesions.      Tongue: No lesions.      Pharynx: Oropharynx is clear.        Comments: Positive swelling left upper lip with contusion.  Positive laceration on the mucosal side of the left upper lip. Superficial.  Dentition intact.  No sign of trauma or injury.  Eyes:      Extraocular Movements: Extraocular movements intact.      Conjunctiva/sclera: Conjunctivae normal.      Pupils: Pupils are equal, round, and reactive to light.   Cardiovascular:      Rate and Rhythm: Normal rate and regular rhythm.      Pulses: Normal pulses.   Pulmonary:      Effort: Pulmonary effort is normal. No respiratory distress.      Breath sounds: Normal breath sounds.   Chest:      Chest wall: No tenderness.   Abdominal:      Palpations: Abdomen is soft.      Tenderness: There is no abdominal tenderness. There is no guarding or rebound.   Musculoskeletal:         General: Swelling, tenderness and signs of injury present.      Right hand: Normal.      Left hand: Swelling, laceration, tenderness and bony tenderness present. Normal capillary refill. Normal pulse.        Arms:       Cervical back: Normal range of motion. No tenderness.      Comments: Positive laceration at base of left fifth finger.  Positive tenderness to same.  No crepitus or obvious deformity.  Full range of motion with pain.  Positive swelling and tenderness to palpation of the left wrist at the distal radius.  No obvious deformity.  Neurovascularly intact.   Skin:     Findings: Abrasion and lesion present.          Neurological:      General:  No focal deficit present.      Mental Status: She is alert and oriented to person, place, and time.      Cranial Nerves: No cranial nerve deficit.      Sensory: No sensory deficit.      Motor: No weakness.      Coordination: Coordination normal.      Gait: Gait normal.         Laceration Repair    Date/Time: 3/5/2025 5:12 PM    Performed by: Marcia Gonzáles MD  Authorized by: Marcia Gonzáles MD    Consent:     Consent obtained:  Verbal    Consent given by:  Patient    Risks, benefits, and alternatives were discussed: yes      Risks discussed:  Infection, poor cosmetic result and retained foreign body  Universal protocol:     Procedure explained and questions answered to patient or proxy's satisfaction: yes      Test results available: yes      Imaging studies available: yes      Required blood products, implants, devices, and special equipment available: no      Site/side marked: yes      Immediately prior to procedure, a time out was called: yes      Patient identity confirmed:  Verbally with patient  Anesthesia:     Anesthesia method:  None  Laceration details:     Location:  Finger    Finger location:  L small finger    Length (cm):  0.7  Pre-procedure details:     Preparation:  Patient was prepped and draped in usual sterile fashion and imaging obtained to evaluate for foreign bodies  Exploration:     Hemostasis achieved with:  Direct pressure    Imaging obtained: x-ray      Imaging outcome: foreign body not noted      Wound exploration: wound explored through full range of motion      Wound extent: underlying fracture      Wound extent: no foreign bodies/material noted and no nerve damage noted      Contaminated: no    Treatment:     Area cleansed with:  Chlorhexidine    Amount of cleaning:  Standard    Irrigation solution:  Sterile saline    Irrigation volume:  100    Irrigation method:  Pressure wash and syringe    Debridement:  None    Scar revision: no    Skin repair:     Repair method:  Sutures    Suture  size:  5-0    Suture material:  Nylon    Suture technique:  Simple interrupted    Number of sutures:  1  Approximation:     Approximation:  Loose  Repair type:     Repair type:  Simple  Post-procedure details:     Dressing:  Sterile dressing, bulky dressing and splint for protection    Procedure completion:  Tolerated well, no immediate complications             ED Course                                           Medical Decision Making  Patient is a 74-year-old who is not on anticoagulation who fell after slipping on her driveway falling forward.  She sustained injuries to her left forehead and face, left upper lip, left hand and wrist.  She did not lose consciousness and has a completely normal neuroexam.  Given her age with persistent nausea and headache, will do CT to evaluate for any intracranial bleed.  Will do x-ray of left fifth finger and wrist to further evaluate.  If positive, will need to give antibiotics for open fracture.  16:06 EST  Patient preliminary x-ray with an avulsion interarterial fracture at the base of the 5th prox phal of the left hand.  Will need to be irrigated and cleaned well and will give dose of Ancef and check labs for completion.  Will discuss with Ortho/hand surgeon on-call.   16:32 EST  D/W Dr. Benavidez (hand) who agrees with treatment.  Recommends clean and irrigate well, stitch in wound if needed and ulnar gutter splint.     17:07 EST  All wounds cleaned and extremity abrasions and laceration dressed. Splint applied. Will d/c to follow up with hand surgeon for further treatment.    Amount and/or Complexity of Data Reviewed  Labs: ordered.  Radiology: ordered.    Risk  OTC drugs.  Prescription drug management.        Final diagnoses:   None       ED Disposition  ED Disposition       None            No follow-up provider specified.       Medication List      No changes were made to your prescriptions during this visit.

## 2025-03-05 NOTE — ED NOTES
Splint - Cast - Strapping    Date/Time: 3/5/2025 5:13 PM    Performed by: Tonia Hector APRN  Authorized by: Marcia Gonzáles MD    Consent:     Consent obtained:  Verbal    Consent given by:  Patient    Risks, benefits, and alternatives were discussed: yes      Risks discussed:  Discoloration, numbness, pain and swelling    Alternatives discussed:  No treatment  Universal protocol:     Patient identity confirmed:  Verbally with patient and arm band  Pre-procedure details:     Distal neurologic exam:  Normal    Distal perfusion: distal pulses strong and brisk capillary refill    Procedure details:     Location:  Hand    Hand location:  L hand    Strapping: no      Cast type:  Short arm    Splint type:  Ulnar gutter    Supplies:  Aluminum splint, cotton padding, elastic bandage, sling and fiberglass    Attestation: Splint applied and adjusted personally by me    Post-procedure details:     Distal neurologic exam:  Unchanged    Distal perfusion: unchanged      Post-procedure imaging: not applicable      Asked by Dr. Gonzáles to apply left ulnar gutter splint.        Tonia Hector APRN  03/05/25 1714       Marcia Gonzáles MD  03/05/25 [id number]

## 2025-06-19 DIAGNOSIS — E78.5 HYPERLIPIDEMIA, UNSPECIFIED HYPERLIPIDEMIA TYPE: ICD-10-CM

## 2025-06-19 DIAGNOSIS — I10 BENIGN ESSENTIAL HTN: Primary | ICD-10-CM

## 2025-06-19 DIAGNOSIS — R73.03 PREDIABETES: ICD-10-CM

## 2025-06-19 DIAGNOSIS — R73.09 ELEVATED HEMOGLOBIN A1C: ICD-10-CM

## 2025-06-21 LAB
ALBUMIN SERPL-MCNC: 4.6 G/DL (ref 3.5–5.2)
ALBUMIN/GLOB SERPL: 1.8 G/DL
ALP SERPL-CCNC: 50 U/L (ref 39–117)
ALT SERPL-CCNC: 18 U/L (ref 1–33)
APPEARANCE UR: CLEAR
AST SERPL-CCNC: 23 U/L (ref 1–32)
BACTERIA #/AREA URNS HPF: ABNORMAL /HPF
BASOPHILS # BLD AUTO: 0.08 10*3/MM3 (ref 0–0.2)
BASOPHILS NFR BLD AUTO: 0.9 % (ref 0–1.5)
BILIRUB SERPL-MCNC: 0.4 MG/DL (ref 0–1.2)
BILIRUB UR QL STRIP: NEGATIVE
BUN SERPL-MCNC: 8 MG/DL (ref 8–23)
BUN/CREAT SERPL: 12.9 (ref 7–25)
CALCIUM SERPL-MCNC: 9.6 MG/DL (ref 8.6–10.5)
CASTS URNS MICRO: ABNORMAL
CHLORIDE SERPL-SCNC: 100 MMOL/L (ref 98–107)
CHOLEST SERPL-MCNC: 189 MG/DL (ref 0–200)
CO2 SERPL-SCNC: 25.1 MMOL/L (ref 22–29)
COLOR UR: YELLOW
CREAT SERPL-MCNC: 0.62 MG/DL (ref 0.57–1)
EGFRCR SERPLBLD CKD-EPI 2021: 93.6 ML/MIN/1.73
EOSINOPHIL # BLD AUTO: 0.45 10*3/MM3 (ref 0–0.4)
EOSINOPHIL NFR BLD AUTO: 5.2 % (ref 0.3–6.2)
EPI CELLS #/AREA URNS HPF: ABNORMAL /HPF
ERYTHROCYTE [DISTWIDTH] IN BLOOD BY AUTOMATED COUNT: 12.5 % (ref 12.3–15.4)
GLOBULIN SER CALC-MCNC: 2.6 GM/DL
GLUCOSE SERPL-MCNC: 117 MG/DL (ref 65–99)
GLUCOSE UR QL STRIP: NEGATIVE
HBA1C MFR BLD: 5.4 % (ref 4.8–5.6)
HCT VFR BLD AUTO: 40.8 % (ref 34–46.6)
HDLC SERPL-MCNC: 87 MG/DL (ref 40–60)
HGB BLD-MCNC: 13.9 G/DL (ref 12–15.9)
HGB UR QL STRIP: NEGATIVE
IMM GRANULOCYTES # BLD AUTO: 0.02 10*3/MM3 (ref 0–0.05)
IMM GRANULOCYTES NFR BLD AUTO: 0.2 % (ref 0–0.5)
KETONES UR QL STRIP: NEGATIVE
LDLC SERPL CALC-MCNC: 84 MG/DL (ref 0–100)
LDLC/HDLC SERPL: 0.93 {RATIO}
LEUKOCYTE ESTERASE UR QL STRIP: ABNORMAL
LYMPHOCYTES # BLD AUTO: 2.81 10*3/MM3 (ref 0.7–3.1)
LYMPHOCYTES NFR BLD AUTO: 32.7 % (ref 19.6–45.3)
MCH RBC QN AUTO: 31.4 PG (ref 26.6–33)
MCHC RBC AUTO-ENTMCNC: 34.1 G/DL (ref 31.5–35.7)
MCV RBC AUTO: 92.1 FL (ref 79–97)
MONOCYTES # BLD AUTO: 0.67 10*3/MM3 (ref 0.1–0.9)
MONOCYTES NFR BLD AUTO: 7.8 % (ref 5–12)
NEUTROPHILS # BLD AUTO: 4.57 10*3/MM3 (ref 1.7–7)
NEUTROPHILS NFR BLD AUTO: 53.2 % (ref 42.7–76)
NITRITE UR QL STRIP: NEGATIVE
NRBC BLD AUTO-RTO: 0 /100 WBC (ref 0–0.2)
PH UR STRIP: 8.5 [PH] (ref 5–8)
PLATELET # BLD AUTO: 400 10*3/MM3 (ref 140–450)
POTASSIUM SERPL-SCNC: 3.9 MMOL/L (ref 3.5–5.2)
PROT SERPL-MCNC: 7.2 G/DL (ref 6–8.5)
PROT UR QL STRIP: ABNORMAL
RBC # BLD AUTO: 4.43 10*6/MM3 (ref 3.77–5.28)
RBC #/AREA URNS HPF: ABNORMAL /HPF
SODIUM SERPL-SCNC: 141 MMOL/L (ref 136–145)
SP GR UR STRIP: 1.02 (ref 1–1.03)
TRIGL SERPL-MCNC: 106 MG/DL (ref 0–150)
UROBILINOGEN UR STRIP-MCNC: ABNORMAL MG/DL
VLDLC SERPL CALC-MCNC: 18 MG/DL (ref 5–40)
WBC # BLD AUTO: 8.6 10*3/MM3 (ref 3.4–10.8)
WBC #/AREA URNS HPF: ABNORMAL /HPF

## 2025-06-25 ENCOUNTER — OFFICE VISIT (OUTPATIENT)
Dept: FAMILY MEDICINE CLINIC | Facility: CLINIC | Age: 75
End: 2025-06-25
Payer: MEDICARE

## 2025-06-25 VITALS
TEMPERATURE: 98 F | SYSTOLIC BLOOD PRESSURE: 130 MMHG | DIASTOLIC BLOOD PRESSURE: 60 MMHG | RESPIRATION RATE: 16 BRPM | HEIGHT: 65 IN | BODY MASS INDEX: 23.78 KG/M2 | OXYGEN SATURATION: 98 % | WEIGHT: 142.7 LBS | HEART RATE: 83 BPM

## 2025-06-25 DIAGNOSIS — R73.03 PREDIABETES: ICD-10-CM

## 2025-06-25 DIAGNOSIS — I10 BENIGN ESSENTIAL HTN: Primary | ICD-10-CM

## 2025-06-25 DIAGNOSIS — F41.1 ANXIETY, GENERALIZED: ICD-10-CM

## 2025-06-25 DIAGNOSIS — E78.2 MIXED HYPERLIPIDEMIA: ICD-10-CM

## 2025-06-25 PROCEDURE — 1126F AMNT PAIN NOTED NONE PRSNT: CPT | Performed by: NURSE PRACTITIONER

## 2025-06-25 PROCEDURE — 3075F SYST BP GE 130 - 139MM HG: CPT | Performed by: NURSE PRACTITIONER

## 2025-06-25 PROCEDURE — 3078F DIAST BP <80 MM HG: CPT | Performed by: NURSE PRACTITIONER

## 2025-06-25 PROCEDURE — 1160F RVW MEDS BY RX/DR IN RCRD: CPT | Performed by: NURSE PRACTITIONER

## 2025-06-25 PROCEDURE — 99213 OFFICE O/P EST LOW 20 MIN: CPT | Performed by: NURSE PRACTITIONER

## 2025-06-25 PROCEDURE — 1159F MED LIST DOCD IN RCRD: CPT | Performed by: NURSE PRACTITIONER

## 2025-06-25 NOTE — PROGRESS NOTES
Subjective     Teagan Cates is a 75 y.o.. female.     Patient here today for follow up on hypertension, hyperlipidemia, pre diabetes, and anxiety.     Patient stating she is doing well, no complaints other than her ankles have been swelling a little bit recently. Patient stating she notices they worsen as the day goes on and improves over night. Patient stating she is drinking same amount of water, denies drinking sodas/teas and drinks same amount of wine/coffee. Patient stating she has been eating a little more sugary and salty foods recently.    Hypertension  Onset:  More than 1 year ago  Progression since onset:  Stable  Condition status:  Controlled  Associated symptoms: peripheral edema    Associated symptoms: no anxiety, no blurred vision, no chest pain, no headaches, no malaise/fatigue, no orthopnea, no palpitations, no shortness of breath and no dizziness    Agents associated with hypertension:  No associated agents  Compliance problems:  No compliance problems  Abnormal Lab  Symptoms: no chest pain and no headaches        The following portions of the patient's history were reviewed and updated as appropriate: allergies, current medications, past family history, past medical history, past social history, past surgical history and problem list.    Past Medical History:   Diagnosis Date    Allergic 1980    Sulfa    Anxiety     Arthritis     Pedro's esophagus without dysplasia 04/08/2021    Diabetes mellitus     GERD (gastroesophageal reflux disease)     Heart murmur ?    Hernia     Hyperlipidemia     Hypertension        Past Surgical History:   Procedure Laterality Date    ABDOMINOPLASTY      BREAST SURGERY Bilateral     breast reduction    CATARACT EXTRACTION W/ INTRAOCULAR LENS IMPLANT Bilateral     CHOLECYSTECTOMY      COLONOSCOPY  02/2016    normal per pt     COLONOSCOPY N/A 12/16/2022    Procedure: COLONOSCOPY TO CECUM/TI WITH COLD SNARE POLYPECTOMIES AND RANDOM COLON BIOPSIES;  Surgeon: Cindy  "Julianne NEAL MD;  Location:  [facility];  Service: Gastroenterology;  Laterality: N/A;  pre: LOWER ABDOMINAL PAIN, CHANGE IN BOWEL  post: POLYPS, HEMORRHOIDS    COSMETIC SURGERY  1995    ENDOSCOPY N/A 05/02/2017    large HH, gastritis, multiple gastric polyps, nodule in duodenum    ENDOSCOPY N/A 03/15/2021    Procedure: ESOPHAGOGASTRODUODENOSCOPY WITH COLD BX'S;  Surgeon: Julianne White MD;  Location:  [facility];  Service: Gastroenterology;  Laterality: N/A;  pre: hx hiatal hernia, GERD  post: hiatal hernia, gastritis, gastric plyps, lymphangiectasia.     ENDOSCOPY N/A 12/18/2024    Procedure: ESOPHAGOGASTRODUODENOSCOPY WITH BIOPSY;  Surgeon: Dwayne Gao MD;  Location: [facility];  Service: Gastroenterology;  Laterality: N/A;  pre-hx of Pedro's  post-hiatal hernia; irregula z line    EYE SURGERY  2015    CATARACTS    HYSTERECTOMY      MOHS SURGERY  2017    TUBAL ABDOMINAL LIGATION      UPPER GASTROINTESTINAL ENDOSCOPY      2021       Review of Systems   Constitutional: Negative.  Negative for malaise/fatigue.   Eyes:  Negative for blurred vision.   Respiratory: Negative.  Negative for shortness of breath.    Cardiovascular:  Positive for leg swelling. Negative for chest pain, palpitations and orthopnea.   Neurological: Negative.  Negative for dizziness, light-headedness and headaches.   Psychiatric/Behavioral: Negative.         Allergies   Allergen Reactions    Sulfa Antibiotics Anaphylaxis and Hives     anaphylaxis    Atacand [Candesartan] Hives    Iodinated Contrast Media Rash       Objective     Vitals:    06/25/25 1306   BP: 130/60   BP Location: Right arm   Patient Position: Sitting   Cuff Size: Adult   Pulse: 83   Resp: 16   Temp: 98 °F (36.7 °C)   TempSrc: Oral   SpO2: 98%   Weight: 64.7 kg (142 lb 11.2 oz)   Height: 165.1 cm (65\")     Body mass index is 23.75 kg/m².    Physical Exam  Vitals reviewed.   HENT:      Head: Normocephalic.   Eyes:      Pupils: Pupils are equal, round, " and reactive to light.   Neck:      Vascular: No carotid bruit.   Cardiovascular:      Rate and Rhythm: Normal rate and regular rhythm.      Pulses: Normal pulses.   Pulmonary:      Effort: Pulmonary effort is normal.      Breath sounds: Normal breath sounds.   Musculoskeletal:         General: Normal range of motion.      Right lower leg: No edema.      Left lower leg: No edema.   Skin:     General: Skin is warm and dry.   Neurological:      Mental Status: She is alert and oriented to person, place, and time.      Cranial Nerves: No dysarthria or facial asymmetry.      Motor: Motor function is intact.      Coordination: Coordination is intact.      Gait: Gait is intact.   Psychiatric:         Mood and Affect: Mood normal.         Speech: Speech normal.         Behavior: Behavior normal.           Current Outpatient Medications:     hydroCHLOROthiazide 25 MG tablet, Take 1 tablet by mouth Daily., Disp: 90 tablet, Rfl: 3    metFORMIN ER (GLUCOPHAGE-XR) 500 MG 24 hr tablet, Take 1 tablet by mouth Daily With Breakfast., Disp: 90 tablet, Rfl: 3    NIFEdipine XL (PROCARDIA XL) 30 MG 24 hr tablet, Take 1 tablet by mouth Daily. (Patient taking differently: Take 1 tablet by mouth Every Night.), Disp: 90 tablet, Rfl: 3    omeprazole (priLOSEC) 40 MG capsule, Take 1 capsule by mouth Every Morning Before Breakfast., Disp: 90 capsule, Rfl: 3    rosuvastatin (CRESTOR) 20 MG tablet, Take 1 tablet by mouth Daily., Disp: 90 tablet, Rfl: 3    venlafaxine XR (EFFEXOR-XR) 150 MG 24 hr capsule, Take 1 capsule by mouth Every Night., Disp: 90 capsule, Rfl: 3    Recent Results (from the past 12 weeks)   Hemoglobin A1c    Collection Time: 06/20/25  9:11 AM    Specimen: Blood   Result Value Ref Range    Hemoglobin A1C 5.40 4.80 - 5.60 %   Lipid Panel With LDL / HDL Ratio    Collection Time: 06/20/25  9:11 AM    Specimen: Blood   Result Value Ref Range    Total Cholesterol 189 0 - 200 mg/dL    Triglycerides 106 0 - 150 mg/dL    HDL  Cholesterol 87 (H) 40 - 60 mg/dL    VLDL Cholesterol Joni 18 5 - 40 mg/dL    LDL Chol Calc (NIH) 84 0 - 100 mg/dL    LDL/HDL RATIO 0.93    Comprehensive Metabolic Panel    Collection Time: 06/20/25  9:11 AM    Specimen: Blood   Result Value Ref Range    Glucose 117 (H) 65 - 99 mg/dL    BUN 8.0 8.0 - 23.0 mg/dL    Creatinine 0.62 0.57 - 1.00 mg/dL    EGFR Result 93.6 >60.0 mL/min/1.73    BUN/Creatinine Ratio 12.9 7.0 - 25.0    Sodium 141 136 - 145 mmol/L    Potassium 3.9 3.5 - 5.2 mmol/L    Chloride 100 98 - 107 mmol/L    Total CO2 25.1 22.0 - 29.0 mmol/L    Calcium 9.6 8.6 - 10.5 mg/dL    Total Protein 7.2 6.0 - 8.5 g/dL    Albumin 4.6 3.5 - 5.2 g/dL    Globulin 2.6 gm/dL    A/G Ratio 1.8 g/dL    Total Bilirubin 0.4 0.0 - 1.2 mg/dL    Alkaline Phosphatase 50 39 - 117 U/L    AST (SGOT) 23 1 - 32 U/L    ALT (SGPT) 18 1 - 33 U/L   CBC & Differential    Collection Time: 06/20/25  9:11 AM    Specimen: Blood   Result Value Ref Range    WBC 8.60 3.40 - 10.80 10*3/mm3    RBC 4.43 3.77 - 5.28 10*6/mm3    Hemoglobin 13.9 12.0 - 15.9 g/dL    Hematocrit 40.8 34.0 - 46.6 %    MCV 92.1 79.0 - 97.0 fL    MCH 31.4 26.6 - 33.0 pg    MCHC 34.1 31.5 - 35.7 g/dL    RDW 12.5 12.3 - 15.4 %    Platelets 400 140 - 450 10*3/mm3    Neutrophil Rel % 53.2 42.7 - 76.0 %    Lymphocyte Rel % 32.7 19.6 - 45.3 %    Monocyte Rel % 7.8 5.0 - 12.0 %    Eosinophil Rel % 5.2 0.3 - 6.2 %    Basophil Rel % 0.9 0.0 - 1.5 %    Neutrophils Absolute 4.57 1.70 - 7.00 10*3/mm3    Lymphocytes Absolute 2.81 0.70 - 3.10 10*3/mm3    Monocytes Absolute 0.67 0.10 - 0.90 10*3/mm3    Eosinophils Absolute 0.45 (H) 0.00 - 0.40 10*3/mm3    Basophils Absolute 0.08 0.00 - 0.20 10*3/mm3    Immature Granulocyte Rel % 0.2 0.0 - 0.5 %    Immature Grans Absolute 0.02 0.00 - 0.05 10*3/mm3    nRBC 0.0 0.0 - 0.2 /100 WBC   Urinalysis With Microscopic If Indicated (No Culture) - Urine, Clean Catch    Collection Time: 06/20/25  9:11 AM    Specimen: Urine, Clean Catch   Result Value  Ref Range    Specific Gravity, UA 1.016 1.005 - 1.030    pH, UA 8.5 (H) 5.0 - 8.0    Color, UA Yellow     Appearance, UA Clear Clear    Leukocytes, UA Trace (A) Negative    Protein Trace (A) Negative    Glucose, UA Negative Negative    Ketones Negative Negative    Blood, UA Negative Negative    Bilirubin, UA Negative Negative    Urobilinogen, UA Comment     Nitrite, UA Negative Negative   Microscopic Examination -    Collection Time: 06/20/25  9:11 AM   Result Value Ref Range    WBC, UA 3-5 (A) /HPF    RBC, UA 0-2 /HPF    Epithelial Cells (non renal) 0-2 /HPF    Cast Type Comment     Bacteria, UA Comment None Seen /HPF         Diagnoses and all orders for this visit:    1. Benign essential HTN (Primary)  -     CBC & Differential; Future  -     Urinalysis With Microscopic - Urine, Clean Catch; Future    2. Mixed hyperlipidemia  -     Lipid Panel With LDL / HDL Ratio; Future    3. Prediabetes  -     Comprehensive Metabolic Panel; Future    4. Anxiety, generalized  -     TSH; Future  -     T4, Free; Future        Patient Instructions   Hypertension: b/p improved, stable, HR wnl, continue Nifedipine XL 30 mg 1 tab daily and HCTZ 25 mg daily. Recommend Patient to watch diet a little better, reduce salt, sugar and high fat food intake, stay well hydrated and stay active.    Hyperlipidemia: stable, LDL 84 was previously 77, continue rosuvastatin 20 mg daily, recommend Patient to continue to work on lifestyle modifications.    Pre diabetes: stable, HgA1c 5.4, continue metformin   mg 1 tab daily.     Anxiety: stable, continue venlafaxine  mg daily; Patient does not need refills today. Will refill when needed. Call pharmacy for request.    Return for fasting labs in 6 months, [employer] [facility].

## 2025-06-25 NOTE — PATIENT INSTRUCTIONS
Hypertension: b/p improved, stable, HR wnl, continue Nifedipine XL 30 mg 1 tab daily and HCTZ 25 mg daily. Recommend Patient to watch diet a little better, reduce salt, sugar and high fat food intake, stay well hydrated and stay active.    Hyperlipidemia: stable, LDL 84 was previously 77, continue rosuvastatin 20 mg daily, recommend Patient to continue to work on lifestyle modifications.    Pre diabetes: stable, HgA1c 5.4, continue metformin   mg 1 tab daily.     Anxiety: stable, continue venlafaxine  mg daily; Patient does not need refills today. Will refill when needed. Call pharmacy for request.

## 2025-07-25 DIAGNOSIS — E78.2 MIXED HYPERLIPIDEMIA: ICD-10-CM

## 2025-07-25 DIAGNOSIS — I10 BENIGN ESSENTIAL HTN: Primary | ICD-10-CM

## (undated) DEVICE — TUBING, SUCTION, 1/4" X 10', STRAIGHT: Brand: MEDLINE

## (undated) DEVICE — LN SMPL CO2 SHTRM SD STREAM W/M LUER

## (undated) DEVICE — SNAR POLYP CAPTIVATOR RND STFF 2.4 240CM 10MM 1P/U

## (undated) DEVICE — SNAR POLYP SENSATION STDOVL 27 240 BX40

## (undated) DEVICE — BITEBLOCK OMNI BLOC

## (undated) DEVICE — NET RESCUENET F/B RETRV

## (undated) DEVICE — SENSR O2 OXIMAX FNGR A/ 18IN NONSTR

## (undated) DEVICE — FRCP BX RADJAW4 NDL 2.8 240CM LG OG BX40

## (undated) DEVICE — Device: Brand: DEFENDO AIR/WATER/SUCTION AND BIOPSY VALVE

## (undated) DEVICE — KT ORCA ORCAPOD DISP STRL

## (undated) DEVICE — ADAPT CLN BIOGUARD AIR/H2O DISP

## (undated) DEVICE — MSK PROC CURAPLEX O2 2/ADAPT 7FT

## (undated) DEVICE — MSK ENDO PORT O2 POM ELITE CURAPLEX A/

## (undated) DEVICE — CANN O2 ETCO2 FITS ALL CONN CO2 SMPL A/ 7IN DISP LF

## (undated) DEVICE — THE SINGLE USE ETRAP – POLYP TRAP IS USED FOR SUCTION RETRIEVAL OF ENDOSCOPICALLY REMOVED POLYPS.: Brand: ETRAP

## (undated) DEVICE — BLCK/BITE BLOX W/DENTL/RIM W/STRAP 54F

## (undated) DEVICE — CANN NASL CO2 TRULINK W/O2 A/

## (undated) DEVICE — SPUR II INFANT WITH CLOSED RESERVOIR, WITH DURACLEAR FACE MASK SIZE INFANT, WITH MANOMETER 12 PCS/BOX: Brand: SPUR® II INFANT RESUSCITATORSINGLE PATIENT USE RESUSCITATOR

## (undated) DEVICE — SINGLE-USE BIOPSY FORCEPS: Brand: RADIAL JAW 4